# Patient Record
Sex: MALE | Race: WHITE | Employment: FULL TIME | URBAN - METROPOLITAN AREA
[De-identification: names, ages, dates, MRNs, and addresses within clinical notes are randomized per-mention and may not be internally consistent; named-entity substitution may affect disease eponyms.]

---

## 2017-01-06 ENCOUNTER — COMMUNICATION - HEALTHEAST (OUTPATIENT)
Dept: INTERNAL MEDICINE | Facility: CLINIC | Age: 42
End: 2017-01-06

## 2017-01-06 DIAGNOSIS — M81.0 POST-MENOPAUSAL OSTEOPOROSIS: ICD-10-CM

## 2017-01-24 ENCOUNTER — COMMUNICATION - HEALTHEAST (OUTPATIENT)
Dept: INTERNAL MEDICINE | Facility: CLINIC | Age: 42
End: 2017-01-24

## 2017-01-24 DIAGNOSIS — M81.0 POST-MENOPAUSAL OSTEOPOROSIS: ICD-10-CM

## 2017-03-02 ENCOUNTER — COMMUNICATION - HEALTHEAST (OUTPATIENT)
Dept: INTERNAL MEDICINE | Facility: CLINIC | Age: 42
End: 2017-03-02

## 2017-03-02 DIAGNOSIS — M81.0 POST-MENOPAUSAL OSTEOPOROSIS: ICD-10-CM

## 2017-04-21 ENCOUNTER — COMMUNICATION - HEALTHEAST (OUTPATIENT)
Dept: INTERNAL MEDICINE | Facility: CLINIC | Age: 42
End: 2017-04-21

## 2017-04-21 DIAGNOSIS — M81.0 POST-MENOPAUSAL OSTEOPOROSIS: ICD-10-CM

## 2017-05-25 ENCOUNTER — HOSPITAL ENCOUNTER (OUTPATIENT)
Dept: GENERAL RADIOLOGY | Facility: CLINIC | Age: 42
Discharge: HOME | End: 2017-05-25
Attending: NURSE PRACTITIONER | Admitting: NURSE PRACTITIONER
Payer: COMMERCIAL

## 2017-05-25 ENCOUNTER — OFFICE VISIT (OUTPATIENT)
Dept: FAMILY MEDICINE | Facility: CLINIC | Age: 42
End: 2017-05-25
Payer: COMMERCIAL

## 2017-05-25 VITALS
DIASTOLIC BLOOD PRESSURE: 82 MMHG | BODY MASS INDEX: 37.97 KG/M2 | SYSTOLIC BLOOD PRESSURE: 114 MMHG | OXYGEN SATURATION: 97 % | TEMPERATURE: 98.7 F | WEIGHT: 222.4 LBS | HEIGHT: 64 IN | HEART RATE: 82 BPM

## 2017-05-25 DIAGNOSIS — J45.30 MILD PERSISTENT ASTHMA WITHOUT COMPLICATION: ICD-10-CM

## 2017-05-25 DIAGNOSIS — M79.672 LEFT FOOT PAIN: ICD-10-CM

## 2017-05-25 DIAGNOSIS — Z00.00 ROUTINE GENERAL MEDICAL EXAMINATION AT A HEALTH CARE FACILITY: Primary | ICD-10-CM

## 2017-05-25 DIAGNOSIS — J45.909 MODERATE ASTHMA: ICD-10-CM

## 2017-05-25 DIAGNOSIS — J45.901 ASTHMA FLARE: ICD-10-CM

## 2017-05-25 DIAGNOSIS — Z13.220 SCREENING CHOLESTEROL LEVEL: ICD-10-CM

## 2017-05-25 DIAGNOSIS — J45.20 MILD INTERMITTENT ASTHMA, UNCOMPLICATED: ICD-10-CM

## 2017-05-25 PROCEDURE — 99213 OFFICE O/P EST LOW 20 MIN: CPT | Mod: 25 | Performed by: NURSE PRACTITIONER

## 2017-05-25 PROCEDURE — 73630 X-RAY EXAM OF FOOT: CPT | Mod: LT

## 2017-05-25 PROCEDURE — 99395 PREV VISIT EST AGE 18-39: CPT | Performed by: NURSE PRACTITIONER

## 2017-05-25 RX ORDER — MONTELUKAST SODIUM 10 MG/1
10 TABLET ORAL AT BEDTIME
Qty: 90 TABLET | Refills: 1 | Status: SHIPPED | OUTPATIENT
Start: 2017-05-25 | End: 2017-12-22

## 2017-05-25 RX ORDER — ALBUTEROL SULFATE 90 UG/1
2 AEROSOL, METERED RESPIRATORY (INHALATION) EVERY 6 HOURS PRN
Qty: 3 INHALER | Refills: 3 | Status: SHIPPED | OUTPATIENT
Start: 2017-05-25 | End: 2019-01-17

## 2017-05-25 RX ORDER — LORATADINE 10 MG/1
10 TABLET ORAL DAILY
COMMUNITY
End: 2023-07-18

## 2017-05-25 RX ORDER — DICLOFENAC SODIUM 75 MG/1
75 TABLET, DELAYED RELEASE ORAL 2 TIMES DAILY PRN
Qty: 90 TABLET | Refills: 1 | Status: SHIPPED | OUTPATIENT
Start: 2017-05-25 | End: 2018-05-21

## 2017-05-25 RX ORDER — ALBUTEROL SULFATE 0.83 MG/ML
1 SOLUTION RESPIRATORY (INHALATION) EVERY 6 HOURS PRN
Qty: 30 VIAL | Refills: 0 | Status: SHIPPED | OUTPATIENT
Start: 2017-05-25

## 2017-05-25 NOTE — PROGRESS NOTES
SUBJECTIVE:     CC: Gerry Palafox is an 41 year old male who presents for preventative health visit.     Healthy Habits:    Do you get at least three servings of calcium containing foods daily (dairy, green leafy vegetables, etc.)? yes    Amount of exercise or daily activities, outside of work: 0 day(s) per week    Problems taking medications regularly No    Medication side effects: No    Have you had an eye exam in the past two years? no    Do you see a dentist twice per year? yes    Do you have sleep apnea, excessive snoring or daytime drowsiness? Yes- has been using CPAP machine for 3 years.    Joint Pain     Onset: 2 months     Description:   Location: Left big toe   Character: Dull ache now, was stabbing pain at first     Intensity: mild currently     Progression of Symptoms: better    Accompanying Signs & Symptoms:  Other symptoms: swelling   History:   Previous similar pain: YES      Precipitating factors:   Trauma or overuse: no     Alleviating factors:  Improved by: rest/inactivity, wearing shoes, diclofenac        Asthma Follow-Up    Was ACT completed today?    Yes    ACT Total Scores 5/25/2017   ACT TOTAL SCORE -   ASTHMA ER VISITS -   ASTHMA HOSPITALIZATIONS -   ACT TOTAL SCORE (Goal Greater than or Equal to 20) 18   In the past 12 months, how many times did you visit the emergency room for your asthma without being admitted to the hospital? 0   In the past 12 months, how many times were you hospitalized overnight because of your asthma? 0       Recent asthma triggers that patient is dealing with: pollens and exercise or sports        -------------------------------------    Today's PHQ-2 Score:   PHQ-2 ( 1999 Pfizer) 5/25/2017 12/31/2014   Q1: Little interest or pleasure in doing things 0 0   Q2: Feeling down, depressed or hopeless 0 0   PHQ-2 Score 0 0       Abuse: Current or Past(Physical, Sexual or Emotional)- No  Do you feel safe in your environment - Yes    Social History   Substance Use Topics      Smoking status: Never Smoker     Smokeless tobacco: Never Used     Alcohol use Yes      Comment: varies          Standardized Alcohol Screening Questionnaire  AUDIT   Questions 0 1 2 3 4 Score   1. How often do you have a drink  containing alcohol? Never Monthly or less 2 to 4  times a  month 2 to 3  times a  week 4 or more  times a  week  4   2. How many drinks containing alcohol  do you have on a typical day when you are drinking? 1 or 2 3 or 4 5 or 6 7 to 9 10 or more  1   3. How often do you have more than five  or more drinks on one occasion? Never Less  than  monthly Monthly Weekly Daily or  almost  daily  2   4. How often during the last year have  you found that you were not able to stop drinking once you had started? Never Less  than  monthly Monthly Weekly Daily or  almost  daily  1   5. How often during the last year have  you failed to do what was normally expected of you because of drinking? Never Less  than  monthly Monthly Weekly Daily or  almost  daily  0   6. How often during the last year have  you needed a first drink in the morning to get yourself going after a heavy drinking session? Never Less  than  monthly Monthly Weekly Daily or  almost  daily  0   7. How often during the last year have you had a feeling of guilt or remorse after drinking? Never Less  than  monthly Monthly Weekly Daily or  almost  daily  0   8. How often during the last year have  you been unable to remember what happened the night before because of your drinking? Never Less  than  monthly Monthly Weekly Daily or  almost  daily  2   9. Have you or someone else been  injured because of your drinking? No  Yes, but not in the last year  Yes,  during the  last year  0   10. Has a relative, friend, doctor or other health care worker been concerned about your drinking or suggested you cut down? No  Yes, but not in the last year  Yes,  during the  last year  0   Total  10   Scoring: A score of 7 for adult men is an indication of  "hazardous drinking (risk for physical or physiological harm); a score of 8 or more is an indication of an alcohol use disorder. A score of 5 or more for adult women  is an indication of hazardous drinking or an alcohol use disorder.       Last PSA: No results found for: PSA    No results for input(s): CHOL, HDL, LDL, TRIG, CHOLHDLRATIO, NHDL in the last 07365 hours.    Reviewed orders with patient. Reviewed health maintenance and updated orders accordingly - Yes    Reviewed and updated as needed this visit by clinical staff  Allergies  Meds         Reviewed and updated as needed this visit by Provider            ROS:  C: NEGATIVE for fever, chills, change in weight  I: NEGATIVE for worrisome rashes, moles or lesions  E: NEGATIVE for vision changes or irritation  ENT: NEGATIVE for ear, mouth and throat problems  R: NEGATIVE for significant cough or SOB  CV: NEGATIVE for chest pain, palpitations or peripheral edema  GI: NEGATIVE for nausea, abdominal pain, heartburn, or change in bowel habits   male: negative for dysuria, hematuria, decreased urinary stream, erectile dysfunction, urethral discharge  MUSCULOSKELETAL:NEGATIVE for significant arthralgias or myalgia  N: NEGATIVE for weakness, dizziness or paresthesias  P: NEGATIVE for changes in mood or affect    Problem list, Medication list, Allergies, and Medical/Social/Surgical histories reviewed in [facility] and updated as appropriate.  Labs reviewed in EPIC  OBJECTIVE:     Pulse 82  Temp 98.7  F (37.1  C) (Oral)  Ht 5' 4\" (1.626 m)  Wt 222 lb 6.4 oz (100.9 kg)  SpO2 97%  BMI 38.17 kg/m2  EXAM:  GENERAL: healthy, alert and no distress  EYES: Eyes grossly normal to inspection, PERRL and conjunctivae and sclerae normal  HENT: normal cephalic/atraumatic, ear canals and TM's normal, nasal mucosa edematous , oropharynx clear and oral mucous membranes moist  NECK: no adenopathy, no asymmetry, masses, or scars and thyroid normal to palpation  RESP: lungs clear to " "auscultation - no rales, rhonchi or wheezes  CV: regular rate and rhythm, normal S1 S2, no S3 or S4, no murmur, click or rub, no peripheral edema and peripheral pulses strong  ABDOMEN: soft, nontender, no hepatosplenomegaly, no masses and bowel sounds normal  MS: tender and warm in left forefoot  SKIN: no suspicious lesions or rashes  PSYCH: mentation appears normal, affect normal/bright    ASSESSMENT/PLAN:         ICD-10-CM    1. Routine general medical examination at a health care facility Z00.00 Comprehensive metabolic panel   2. Left foot pain M79.672 ORTHO  REFERRAL     XR Foot Left G/E 3 Views     diclofenac (VOLTAREN) 75 MG EC tablet   3. Moderate asthma J45.998 montelukast (SINGULAIR) 10 MG tablet   4. Mild intermittent asthma, uncomplicated J45.20 albuterol (PROAIR HFA/PROVENTIL HFA/VENTOLIN HFA) 108 (90 BASE) MCG/ACT Inhaler   5. Asthma flare J45.901 albuterol (2.5 MG/3ML) 0.083% neb solution   6. Mild persistent asthma without complication J45.30 albuterol (2.5 MG/3ML) 0.083% neb solution   7. Screening cholesterol level Z13.220 Lipid Profile       COUNSELING:  Reviewed preventive health counseling, as reflected in patient instructions       Regular exercise       Healthy diet/nutrition         reports that he has never smoked. He has never used smokeless tobacco.    Estimated body mass index is 38.17 kg/(m^2) as calculated from the following:    Height as of this encounter: 5' 4\" (1.626 m).    Weight as of this encounter: 222 lb 6.4 oz (100.9 kg).   Weight management plan: Discussed healthy diet and exercise guidelines and patient will follow up in 12 months in clinic to re-evaluate.    Counseling Resources:  ATP IV Guidelines  Pooled Cohorts Equation Calculator  FRAX Risk Assessment  ICSI Preventive Guidelines  Dietary Guidelines for Americans, 2010  USDA's MyPlate  ASA Prophylaxis  Lung CA Screening    CHARO Shepard CNP  [facility]  "

## 2017-05-25 NOTE — NURSING NOTE
"Chief Complaint   Patient presents with     Physical       Initial /82 (BP Location: Right arm, Patient Position: Chair, Cuff Size: Adult Large)  Pulse 82  Temp 98.7  F (37.1  C) (Oral)  Ht 5' 4\" (1.626 m)  Wt 222 lb 6.4 oz (100.9 kg)  SpO2 97%  BMI 38.17 kg/m2 Estimated body mass index is 38.17 kg/(m^2) as calculated from the following:    Height as of this encounter: 5' 4\" (1.626 m).    Weight as of this encounter: 222 lb 6.4 oz (100.9 kg).  Medication Reconciliation: complete     Krysten Hinds CMA    "

## 2017-05-25 NOTE — MR AVS SNAPSHOT
After Visit Summary   5/25/2017    Gerry Palafox    MRN: [id number]           Patient Information     Date Of Birth          1975        Visit Information        Provider Department      5/25/2017 11:00 AM Pooja Rodriguez APRN [facility]        Today's Diagnoses     Routine general medical examination at a health care facility    -  1    Left foot pain        Moderate asthma        Mild intermittent asthma, uncomplicated        Asthma flare        Mild persistent asthma without complication          Care Instructions      Preventive Health Recommendations  Male Ages 26 - 39    Yearly exam:             See your health care provider every year in order to  o   Review health changes.   o   Discuss preventive care.    o   Review your medicines if your doctor has prescribed any.    You should be tested each year for STDs (sexually transmitted diseases), if you re at risk.     After age 35, talk to your provider about cholesterol testing. If you are at risk for heart disease, have your cholesterol tested at least every 5 years.     If you are at risk for diabetes, you should have a diabetes test (fasting glucose).  Shots: Get a flu shot each year. Get a tetanus shot every 10 years.     Nutrition:    Eat at least 5 servings of fruits and vegetables daily.     Eat whole-grain bread, whole-wheat pasta and brown rice instead of white grains and rice.     Talk to your provider about Calcium and Vitamin D.     Lifestyle    Exercise for at least 150 minutes a week (30 minutes a day, 5 days a week). This will help you control your weight and prevent disease.     Limit alcohol to one drink per day.     No smoking.     Wear sunscreen to prevent skin cancer.     See your dentist every six months for an exam and cleaning.             Follow-ups after your visit        Additional Services     ORTHO  REFERRAL       [facility] is referring you to the Orthopedic   Services at [facility].       The  Representative will assist you in the coordination of your Orthopedic and Musculoskeletal Care as prescribed by your physician.    The  Representative will call you within 1 business day to help schedule your appointment, or you may contact the  Representative at:    All areas ~ [phone]     Type of Referral : [facility]       Timeframe requested: 3 - 5 days    Coverage of these services is subject to the terms and limitations of your health insurance plan.  Please call member services at your health plan with any benefit or coverage questions.      If X-rays, CT or MRI's have been performed, please contact the facility where they were done to arrange for , prior to your scheduled appointment.  Please bring this referral request to your appointment and present it to your specialist.                  Future tests that were ordered for you today     Open Future Orders        Priority Expected Expires Ordered    XR Foot Left G/E 3 Views Routine 5/25/2017 5/25/2018 5/25/2017            Who to contact     If you have questions or need follow up information about today's clinic visit or your schedule please contact [facility] directly at [phone].  Normal or non-critical lab and imaging results will be communicated to you by MyChart, letter or phone within 4 business days after the clinic has received the results. If you do not hear from us within 7 days, please contact the clinic through [employer] or phone. If you have a critical or abnormal lab result, we will notify you by phone as soon as possible.  Submit refill requests through [employer] or call your pharmacy and they will forward the refill request to us. Please allow 3 business days for your refill to be completed.          Additional Information About Your Visit        [employer] Information     [employer] gives you secure  "access to your electronic health record. If you see a primary care provider, you can also send messages to your care team and make appointments. If you have questions, please call your primary care clinic.  If you do not have a primary care provider, please call [phone] and they will assist you.        Care EveryWhere ID     This is your Care EveryWhere ID. This could be used by other organizations to access your [place] medical records  [id number]        Your Vitals Were     Pulse Temperature Height Pulse Oximetry BMI (Body Mass Index)       82 98.7  F (37.1  C) (Oral) 5' 4\" (1.626 m) 97% 38.17 kg/m2        Blood Pressure from Last 3 Encounters:   05/25/17 114/82   01/17/15 145/87   12/31/14 138/90    Weight from Last 3 Encounters:   05/25/17 222 lb 6.4 oz (100.9 kg)   01/17/15 225 lb (102.1 kg)   12/31/14 227 lb (103 kg)              We Performed the Following     ORTHO  REFERRAL          Today's Medication Changes          These changes are accurate as of: 5/25/17 11:26 AM.  If you have any questions, ask your nurse or doctor.               Start taking these medicines.        Dose/Directions    diclofenac 75 MG EC tablet   Commonly known as:  VOLTAREN   Used for:  Left foot pain   Started by:  Pooja Rodriguez APRN CNP        Dose:  75 mg   Take 1 tablet (75 mg) by mouth 2 times daily as needed for moderate pain   Quantity:  90 tablet   Refills:  1       montelukast 10 MG tablet   Commonly known as:  SINGULAIR   Used for:  Moderate asthma   Started by:  Pooja Rodriguez APRN CNP        Dose:  10 mg   Take 1 tablet (10 mg) by mouth At Bedtime   Quantity:  90 tablet   Refills:  1         Stop taking these medicines if you haven't already. Please contact your care team if you have questions.     azithromycin 250 MG tablet   Commonly known as:  ZITHROMAX   Stopped by:  Pooja Rodriguez APRN CNP           guaiFENesin-codeine 100-10 MG/5ML Soln solution   Commonly known as:  ROBITUSSIN AC "   Stopped by:  Pooja Rodriguez APRN CNP           ipratropium - albuterol 0.5 mg/2.5 mg/3 mL 0.5-2.5 (3) MG/3ML neb solution   Commonly known as:  DUONEB   Stopped by:  Pooja Rodriguez APRN CNP           predniSONE 20 MG tablet   Commonly known as:  DELTASONE   Stopped by:  Pooja Rodriguez APRN CNP                Where to get your medicines      These medications were sent to [place] [address] [place] [facility] [place] [place] - [address]  [address] [address]     Phone:  [phone]     albuterol (2.5 MG/3ML) 0.083% neb solution    albuterol 108 (90 BASE) MCG/ACT Inhaler    diclofenac 75 MG EC tablet    montelukast 10 MG tablet                Primary Care Provider Office Phone # Fax #    CHARO Shepard [phone]       [facility] [address] [address]  [facility] [address]        Thank you!     Thank you for choosing [facility]  for your care. Our goal is always to provide you with excellent care. Hearing back from our patients is one way we can continue to improve our services. Please take a few minutes to complete the written survey that you may receive in the mail after your visit with us. Thank you!             Your Updated Medication List - Protect others around you: Learn how to safely use, store and throw away your medicines at www.disposemymeds.org.          This list is accurate as of: 5/25/17 11:26 AM.  Always use your most recent med list.                   Brand Name Dispense Instructions for use    * albuterol 108 (90 BASE) MCG/ACT Inhaler    PROAIR HFA/PROVENTIL HFA/VENTOLIN HFA    3 Inhaler    Inhale 2 puffs into the lungs every 6 hours as needed for shortness of breath / dyspnea or wheezing       * albuterol (2.5 MG/3ML) 0.083% neb solution     30 vial    Take 1 vial (2.5 mg) by nebulization every 6 hours as needed for shortness of breath / dyspnea or wheezing       DAILY MULTIVITAMIN PO          diclofenac 75 MG EC  tablet    VOLTAREN    90 tablet    Take 1 tablet (75 mg) by mouth 2 times daily as needed for moderate pain       DICLOFENAC PO          fluticasone 44 MCG/ACT Inhaler    FLOVENT HFA    3 Inhaler    Inhale 2 puffs into the lungs 2 times daily       glucosamine-chondroitin 500-400 MG Caps per capsule      Take 1 capsule by mouth daily       IBUPROFEN PO      Take 400 mg by mouth 2 times daily as needed for moderate pain       loratadine 10 MG tablet    CLARITIN     Take 10 mg by mouth daily       montelukast 10 MG tablet    SINGULAIR    90 tablet    Take 1 tablet (10 mg) by mouth At Bedtime       order for DME     1 Device    Equipment being ordered: Nebulizer       * Notice:  This list has 2 medication(s) that are the same as other medications prescribed for you. Read the directions carefully, and ask your doctor or other care provider to review them with you.

## 2017-05-26 ASSESSMENT — ASTHMA QUESTIONNAIRES: ACT_TOTALSCORE: 18

## 2017-06-09 ENCOUNTER — TELEPHONE (OUTPATIENT)
Dept: FAMILY MEDICINE | Facility: CLINIC | Age: 42
End: 2017-06-09

## 2017-06-09 DIAGNOSIS — Z30.2 ENCOUNTER FOR VASECTOMY: Primary | ICD-10-CM

## 2017-06-09 NOTE — TELEPHONE ENCOUNTER
Route to Provider pool    Pt requesting referral for Vasectomy    Urology referral cued    Advise     Mahsa Prasad RN

## 2017-06-12 ENCOUNTER — OFFICE VISIT (OUTPATIENT)
Dept: PODIATRY | Facility: CLINIC | Age: 42
End: 2017-06-12
Payer: COMMERCIAL

## 2017-06-12 VITALS
DIASTOLIC BLOOD PRESSURE: 77 MMHG | HEART RATE: 71 BPM | BODY MASS INDEX: 36.9 KG/M2 | WEIGHT: 215 LBS | OXYGEN SATURATION: 98 % | SYSTOLIC BLOOD PRESSURE: 128 MMHG | RESPIRATION RATE: 14 BRPM | TEMPERATURE: 97.6 F

## 2017-06-12 DIAGNOSIS — M25.80 SESAMOIDITIS: ICD-10-CM

## 2017-06-12 DIAGNOSIS — M79.672 LEFT FOOT PAIN: Primary | ICD-10-CM

## 2017-06-12 PROCEDURE — 99203 OFFICE O/P NEW LOW 30 MIN: CPT | Performed by: PODIATRIST

## 2017-06-12 NOTE — MR AVS SNAPSHOT
After Visit Summary   6/12/2017    Gerry Palafox    MRN: [id number]           Patient Information     Date Of Birth          1975        Visit Information        Provider Department      6/12/2017 10:20 AM Bret Chowdhury DPM [facility]        Today's Diagnoses     Left foot pain    -  1    Sesamoiditis          Care Instructions    Sesamoid Injuries in the Foot  What is a Sesamoid?  A sesamoid is a bone embedded in a tendon. Sesamoids are found in several joints in the body. In the normal foot, the sesamoids are two pea-shaped bones located in the ball of the foot, beneath the big toe joint.  Acting as a pulley for tendons, the sesamoids help the big toe move normally and provide leverage when the big toe  pushes off  during walking and running. The sesamoids also serve as a weight-bearing surface for the first metatarsal bone (the long bone connected to the big toe), absorbing the weight placed on the ball of the foot when walking, running, and jumping.  Sesamoid injuries can involve the bones, tendons, and/or surrounding tissue in the joint. They are often associated with activities requiring increased pressure on the ball of the foot, such as running, basketball, football, golf, tennis, and ballet. In addition, people with high arches are at risk for developing sesamoid problems. Frequent wearing of high-heeled shoes can also be a contributing factor.  Types of Sesamoid Injuries in the Foot  There are three types of sesamoid injuries in the foot:  Turf toe. This is an injury of the soft tissue surrounding the big toe joint. It usually occurs when the big toe joint is extended beyond its normal range. Turf toe causes immediate, sharp pain and swelling. It usually affects the entire big toe joint and limits the motion of the toe. Turf toe may result in an injury to the soft tissue attached to the sesamoid or a fracture of the sesamoid. Sometimes a  pop  is felt at the  moment of injury.   Fracture. A fracture (break) in a sesamoid bone can be either acute or chronic.   An acute fracture is caused by trauma - a direct blow or impact to the bone. An acute sesamoid fracture produces immediate pain and swelling at the site of the break, but usually does not affect the entire big toe joint.   A chronic fracture is a stress fracture (a hairline break usually caused by repetitive stress or overuse). A chronic sesamoid fracture produces longstanding pain in the ball of the foot beneath the big toe joint. The pain, which tends to come and go, generally is aggravated with activity and relieved with rest.  Sesamoiditis. This is an overuse injury involving chronic inflammation of the sesamoid bones and the tendons involved with those bones. Sesamoiditis is caused by increased pressure to the sesamoids. Often, sesamoiditis is associated with a dull, longstanding pain beneath the big toe joint. The pain comes and goes, usually occurring with certain shoes or certain activities.  Diagnosis  In diagnosing a sesamoid injury, the foot and ankle surgeon will examine the foot, focusing on the big toe joint. The surgeon will press on the big toe, move it up and down, and may assess the patient s walking and evaluate the wear pattern on the patient s shoes. X-rays are ordered, and in some cases, advanced imaging studies may be ordered.  Non-Surgical Treatment  Non-surgical treatment for sesamoid injuries of the foot may include one or more of the following options, depending on the type of injury and degree of severity:  Padding, strapping, or taping. A pad may be placed in the shoe to cushion the inflamed sesamoid area, or the toe may be taped or strapped to relieve that area of tension.   Immobilization. The foot may be placed in a cast or removable walking cast. Crutches may be used to prevent placing weight on the foot.   Oral medications. Nonsteroidal anti-inflammatory drugs (NSAIDs), such as  ibuprofen, are often helpful in reducing the pain and inflammation.   Physical therapy. The rehabilitation period following immobilization sometimes includes physical therapy, such as exercises (range-of-motion, strengthening, and conditioning) and ultrasound therapy.   Steroid injections. In some cases, cortisone is injected in the joint to reduce pain and inflammation.   Orthotic devices. Custom orthotic devices that fit into the shoe may be prescribed for long-term treatment of sesamoiditis to balance the pressure placed on the ball of the foot.  When is Surgery Needed?  When sesamoid injuries fail to respond to non-surgical treatment, surgery may be required. The foot and ankle surgeon will determine the type of procedure that is best suited to the individual patient.        PRICE Therapy    Many aches and pains throughout the foot and ankle can be helped with many simple treatments.  This is usually described as PRICE Therapy.      P - Protection - often times, inflammation/pain in the lower extremity is not able to improve simply because the areas involved are never allowed to rest.  Every step we take can bother the problematic area.  Protecting those areas is an important step in the healing process.  This may involve a walking cast boot, a special insert/orthotic device, an ankle brace, or simply avoiding barefoot walking.    R - Rest - in addition to protecting the foot/ankle, resting is an important, but often times difficult, treatment option.  Getting off your feet when they bother you, and specifically avoiding activities that cause pain/discomfort, are very beneficial to prevent, and treat, foot/ankle pain.      I - Ice - icing regularly can help to decrease inflammation and swelling in the foot, thus decreasing pain.  Using an ice pack or a bag of frozen peas works very well.  Ice for 20 minutes multiple times per day as needed.  Do not place the ice directly on the skin as this can cause tissue  damage.    C - Compression - using a compression wrap or an ACE wrap can help to decrease swelling, which can help to decrease pain.  Wearing the wraps is generally not needed at night, but they should be worn on a regular basis when you are going to be on your feet for prolonged periods as gravity tends to pull fluids down to your feet/ankles.    E - Elevation - elevating your lower extremities multiple times daily for 15-20 minutes can help to decrease swelling, which works well in decreasing pain levels.      NSAID/Tylenol - An anti-inflammatory, like Aleve or ibuprofen, and/or a pain medication, such as Tylenol, can help to improve pain levels and get the issue resolved sooner rather than later.  Anyone with liver issues should be careful with Tylenol, and anyone with high blood pressure or heart, stomach or kidney issues should be careful with anti-inflammatories.  Please ask if you have questions about these medications, including dosage.              Follow-ups after your visit        Additional Services     ORTHOTICS REFERRAL       Please be aware that coverage of these services is subject to the terms and limitations of your health insurance plan.  Call member services at your health plan with any benefit or coverage questions.      Please bring the following to your appointment:    >>   Any x-rays, CTs or MRIs which have been performed.  Contact the facility where they were done to arrange for  prior to your scheduled appointment.  Any new CT, MRI or other procedures ordered by your specialist must be performed at a [facility] facility or coordinated by your clinic's referral office.    >>   List of current medications   >>   This referral request   >>   Any documents/labs given to you for this referral    ==This Referral PRINTS in the [facility] (ORTHOTICS & PROSTHETICS) Central scheduling office ==     The [facility] Central Scheduling staff will contact patient to arrange  appointments. Central Scheduling Phone #:  [facility]  [phone]     Orthotics: Foot Orthotics - offload left sesamoids                  Your next 10 appointments already scheduled     Jun 14, 2017 10:00 AM CDT   LAB with RD LAB   [facility] ([facility]    [address] [place] [id number] [phone]           Patient must bring picture ID.  Patient should be prepared to give a urine specimen  Please do not eat 10-12 hours before your appointment if you are coming in fasting for labs on lipids, cholesterol, or glucose (sugar).  Pregnant women should follow their Care Team instructions. Water with medications is okay. Do not drink coffee or other fluids.   If you have concerns about taking  your medications, please ask at office or if scheduling via [employer], send a message by clicking on Secure Messaging, Message Your Care Team.              Who to contact     If you have questions or need follow up information about today's clinic visit or your schedule please contact [facility] directly at [phone].  Normal or non-critical lab and imaging results will be communicated to you by [email or web address], letter or phone within 4 business days after the clinic has received the results. If you do not hear from us within 7 days, please contact the clinic through [employer] or phone. If you have a critical or abnormal lab result, we will notify you by phone as soon as possible.  Submit refill requests through [employer] or call your pharmacy and they will forward the refill request to us. Please allow 3 business days for your refill to be completed.          Additional Information About Your Visit        [employer] Information     [employer] gives you secure access to your electronic health record. If you see a primary care provider, you can also send messages to your care team and make appointments. If you have questions, please call your primary care  clinic.  If you do not have a primary care provider, please call [phone] and they will assist you.        Care EveryWhere ID     This is your Care EveryWhere ID. This could be used by other organizations to access your [facility] medical records  [id number]        Your Vitals Were     Pulse Temperature Respirations Pulse Oximetry BMI (Body Mass Index)       71 97.6  F (36.4  C) (Oral) 14 98% 36.9 kg/m2        Blood Pressure from Last 3 Encounters:   06/12/17 128/77   05/25/17 114/82   01/17/15 145/87    Weight from Last 3 Encounters:   06/12/17 215 lb (97.5 kg)   05/25/17 222 lb 6.4 oz (100.9 kg)   01/17/15 225 lb (102.1 kg)              We Performed the Following     ORTHOTICS REFERRAL        Primary Care Provider Office Phone # Fax #    CHARO Shepard [phone]       [facility] [address] [place] [id number]        Thank you!     Thank you for choosing [facility]  for your care. Our goal is always to provide you with excellent care. Hearing back from our patients is one way we can continue to improve our services. Please take a few minutes to complete the written survey that you may receive in the mail after your visit with us. Thank you!             Your Updated Medication List - Protect others around you: Learn how to safely use, store and throw away your medicines at www.disposemymeds.org.          This list is accurate as of: 6/12/17  3:20 PM.  Always use your most recent med list.                   Brand Name Dispense Instructions for use    * albuterol 108 (90 BASE) MCG/ACT Inhaler    PROAIR HFA/PROVENTIL HFA/VENTOLIN HFA    3 Inhaler    Inhale 2 puffs into the lungs every 6 hours as needed for shortness of breath / dyspnea or wheezing       * albuterol (2.5 MG/3ML) 0.083% neb solution     30 vial    Take 1 vial (2.5 mg) by nebulization every 6 hours as needed for shortness of breath / dyspnea or wheezing       DAILY MULTIVITAMIN  PO          diclofenac 75 MG EC tablet    VOLTAREN    90 tablet    Take 1 tablet (75 mg) by mouth 2 times daily as needed for moderate pain       DICLOFENAC PO          fluticasone 44 MCG/ACT Inhaler    FLOVENT HFA    3 Inhaler    Inhale 2 puffs into the lungs 2 times daily       glucosamine-chondroitin 500-400 MG Caps per capsule      Take 1 capsule by mouth daily       IBUPROFEN PO      Take 400 mg by mouth 2 times daily as needed for moderate pain       loratadine 10 MG tablet    CLARITIN     Take 10 mg by mouth daily       montelukast 10 MG tablet    SINGULAIR    90 tablet    Take 1 tablet (10 mg) by mouth At Bedtime       order for DME     1 Device    Equipment being ordered: Nebulizer       * Notice:  This list has 2 medication(s) that are the same as other medications prescribed for you. Read the directions carefully, and ask your doctor or other care provider to review them with you.

## 2017-06-12 NOTE — PROGRESS NOTES
PATIENT HISTORY:  Gerry Palafox is a 41 year old male who presents to clinic for left foot pain, intermittent for years.  No new injury.  2-8/10 pain, worse with walking.  He has tried ice, rest, otc inserts, with some improvement.      Review of Systems:  Patient denies fever, chills, rash, wound, stiffness, weakness, heart burn, blood in stool, chest pain with activity, calf pain when walking, chronic cough, easy bleeding/bruising, swelling of ankles, excessive thirst, depression, anxiety.  Patient admits to limping, numbness, SOB with activity, fatigue.     PAST MEDICAL HISTORY:   Past Medical History:   Diagnosis Date     Mild persistent asthma         PAST SURGICAL HISTORY: No past surgical history on file.     MEDICATIONS:   Current Outpatient Prescriptions:      DICLOFENAC PO, , Disp: , Rfl:      loratadine (CLARITIN) 10 MG tablet, Take 10 mg by mouth daily, Disp: , Rfl:      montelukast (SINGULAIR) 10 MG tablet, Take 1 tablet (10 mg) by mouth At Bedtime, Disp: 90 tablet, Rfl: 1     diclofenac (VOLTAREN) 75 MG EC tablet, Take 1 tablet (75 mg) by mouth 2 times daily as needed for moderate pain, Disp: 90 tablet, Rfl: 1     albuterol (PROAIR HFA/PROVENTIL HFA/VENTOLIN HFA) 108 (90 BASE) MCG/ACT Inhaler, Inhale 2 puffs into the lungs every 6 hours as needed for shortness of breath / dyspnea or wheezing, Disp: 3 Inhaler, Rfl: 3     albuterol (2.5 MG/3ML) 0.083% neb solution, Take 1 vial (2.5 mg) by nebulization every 6 hours as needed for shortness of breath / dyspnea or wheezing, Disp: 30 vial, Rfl: 0     ORDER FOR DME, Equipment being ordered: Nebulizer, Disp: 1 Device, Rfl: 0     IBUPROFEN PO, Take 400 mg by mouth 2 times daily as needed for moderate pain, Disp: , Rfl:      Multiple Vitamin (DAILY MULTIVITAMIN PO), , Disp: , Rfl:      glucosamine-chondroitin 500-400 MG CAPS, Take 1 capsule by mouth daily, Disp: , Rfl:      fluticasone (FLOVENT HFA) 44 MCG/ACT inhaler, Inhale 2 puffs into the lungs 2 times daily,  Disp: 3 Inhaler, Rfl: 3     ALLERGIES:    Allergies   Allergen Reactions     Penicillin G      hives        SOCIAL HISTORY:   Social History     Social History     Marital status:      Spouse name: N/A     Number of children: N/A     Years of education: N/A     Occupational History     Not on file.     Social History Main Topics     Smoking status: Never Smoker     Smokeless tobacco: Never Used     Alcohol use Yes      Comment: varies     Drug use: No     Sexual activity: Yes     Partners: Female     Other Topics Concern     Parent/Sibling W/ Cabg, Mi Or Angioplasty Before 65f 55m? No     Social History Narrative        FAMILY HISTORY:   Family History   Problem Relation Age of Onset     Arthritis Mother      CANCER Father      lung cancer      Other         EXAM:Vitals: /77  Pulse 71  Temp 97.6  F (36.4  C) (Oral)  Resp 14  Wt 215 lb (97.5 kg)  SpO2 98%  BMI 36.9 kg/m2  BMI= Body mass index is 36.9 kg/(m^2).    General appearance: Patient is alert and fully cooperative with history & exam.  No sign of distress is noted during the visit.     Psychiatric: Affect is pleasant & appropriate.  Patient appears motivated to improve health.     Respiratory: Breathing is regular & unlabored while sitting.     HEENT: Hearing is intact to spoken word.  Speech is clear.  No gross evidence of visual impairment that would impact ambulation.     Dermatologic: Skin is intact to the L foot without significant lesions, rash or abrasion.  No paronychia or evidence of soft tissue infection is noted.     Vascular: DP & PT pulses are intact & regular on the L.  No significant edema or varicosities noted.  CFT and skin temperature are normal.     Neurologic: Lower extremity sensation is intact to light touch.  No evidence of weakness or contracture in the lower extremities.  No evidence of neuropathy.     Musculoskeletal:  Cavus foot noted.  Mild pain to palpation of left foot sesamoids.  1st MPJ ROM full and pain free.  Patient is ambulatory without assistive device or brace.  No gross ankle deformity noted.  No foot or ankle joint effusion is noted.    XRs of left foot reviewed with pt.  No acute findings per radiology read.  Bipartite sesamoids.     ASSESSMENT: L foot pain, sesamoiditis     PLAN:  Reviewed patient's chart in epic.  Discussed condition and treatment options including pros and cons.    Exam suggests sesamoiditis.  RICE advised.  No barefoot walking.  Advised stiff soled shoes.  Will send for custom orthotics to offload.  Discussed MRI, possible boot if not improving.  F/u if worsening or failing to improve.    Epic was down during this encounter, but AVS will be available via [employer].       Bret Chowdhury DPM, FACFAS    Weight management plan: Patient was referred to their PCP to discuss a diet and exercise plan.

## 2017-06-12 NOTE — PATIENT INSTRUCTIONS
Sesamoid Injuries in the Foot  What is a Sesamoid?  A sesamoid is a bone embedded in a tendon. Sesamoids are found in several joints in the body. In the normal foot, the sesamoids are two pea-shaped bones located in the ball of the foot, beneath the big toe joint.  Acting as a pulley for tendons, the sesamoids help the big toe move normally and provide leverage when the big toe  pushes off  during walking and running. The sesamoids also serve as a weight-bearing surface for the first metatarsal bone (the long bone connected to the big toe), absorbing the weight placed on the ball of the foot when walking, running, and jumping.  Sesamoid injuries can involve the bones, tendons, and/or surrounding tissue in the joint. They are often associated with activities requiring increased pressure on the ball of the foot, such as running, basketball, football, golf, tennis, and ballet. In addition, people with high arches are at risk for developing sesamoid problems. Frequent wearing of high-heeled shoes can also be a contributing factor.  Types of Sesamoid Injuries in the Foot  There are three types of sesamoid injuries in the foot:  Turf toe. This is an injury of the soft tissue surrounding the big toe joint. It usually occurs when the big toe joint is extended beyond its normal range. Turf toe causes immediate, sharp pain and swelling. It usually affects the entire big toe joint and limits the motion of the toe. Turf toe may result in an injury to the soft tissue attached to the sesamoid or a fracture of the sesamoid. Sometimes a  pop  is felt at the moment of injury.   Fracture. A fracture (break) in a sesamoid bone can be either acute or chronic.   An acute fracture is caused by trauma   a direct blow or impact to the bone. An acute sesamoid fracture produces immediate pain and swelling at the site of the break, but usually does not affect the entire big toe joint.   A chronic fracture is a stress fracture (a hairline  break usually caused by repetitive stress or overuse). A chronic sesamoid fracture produces longstanding pain in the ball of the foot beneath the big toe joint. The pain, which tends to come and go, generally is aggravated with activity and relieved with rest.  Sesamoiditis. This is an overuse injury involving chronic inflammation of the sesamoid bones and the tendons involved with those bones. Sesamoiditis is caused by increased pressure to the sesamoids. Often, sesamoiditis is associated with a dull, longstanding pain beneath the big toe joint. The pain comes and goes, usually occurring with certain shoes or certain activities.  Diagnosis  In diagnosing a sesamoid injury, the foot and ankle surgeon will examine the foot, focusing on the big toe joint. The surgeon will press on the big toe, move it up and down, and may assess the patient s walking and evaluate the wear pattern on the patient s shoes. X-rays are ordered, and in some cases, advanced imaging studies may be ordered.  Non-Surgical Treatment  Non-surgical treatment for sesamoid injuries of the foot may include one or more of the following options, depending on the type of injury and degree of severity:  Padding, strapping, or taping. A pad may be placed in the shoe to cushion the inflamed sesamoid area, or the toe may be taped or strapped to relieve that area of tension.   Immobilization. The foot may be placed in a cast or removable walking cast. Crutches may be used to prevent placing weight on the foot.   Oral medications. Nonsteroidal anti-inflammatory drugs (NSAIDs), such as ibuprofen, are often helpful in reducing the pain and inflammation.   Physical therapy. The rehabilitation period following immobilization sometimes includes physical therapy, such as exercises (range-of-motion, strengthening, and conditioning) and ultrasound therapy.   Steroid injections. In some cases, cortisone is injected in the joint to reduce pain and inflammation.    Orthotic devices. Custom orthotic devices that fit into the shoe may be prescribed for long-term treatment of sesamoiditis to balance the pressure placed on the ball of the foot.  When is Surgery Needed?  When sesamoid injuries fail to respond to non-surgical treatment, surgery may be required. The foot and ankle surgeon will determine the type of procedure that is best suited to the individual patient.        PRICE Therapy    Many aches and pains throughout the foot and ankle can be helped with many simple treatments.  This is usually described as PRICE Therapy.      P - Protection - often times, inflammation/pain in the lower extremity is not able to improve simply because the areas involved are never allowed to rest.  Every step we take can bother the problematic area.  Protecting those areas is an important step in the healing process.  This may involve a walking cast boot, a special insert/orthotic device, an ankle brace, or simply avoiding barefoot walking.    R - Rest - in addition to protecting the foot/ankle, resting is an important, but often times difficult, treatment option.  Getting off your feet when they bother you, and specifically avoiding activities that cause pain/discomfort, are very beneficial to prevent, and treat, foot/ankle pain.      I - Ice - icing regularly can help to decrease inflammation and swelling in the foot, thus decreasing pain.  Using an ice pack or a bag of frozen peas works very well.  Ice for 20 minutes multiple times per day as needed.  Do not place the ice directly on the skin as this can cause tissue damage.    C - Compression - using a compression wrap or an ACE wrap can help to decrease swelling, which can help to decrease pain.  Wearing the wraps is generally not needed at night, but they should be worn on a regular basis when you are going to be on your feet for prolonged periods as gravity tends to pull fluids down to your feet/ankles.    E - Elevation - elevating  your lower extremities multiple times daily for 15-20 minutes can help to decrease swelling, which works well in decreasing pain levels.      NSAID/Tylenol - An anti-inflammatory, like Aleve or ibuprofen, and/or a pain medication, such as Tylenol, can help to improve pain levels and get the issue resolved sooner rather than later.  Anyone with liver issues should be careful with Tylenol, and anyone with high blood pressure or heart, stomach or kidney issues should be careful with anti-inflammatories.  Please ask if you have questions about these medications, including dosage.

## 2017-06-12 NOTE — LETTER
6/12/2017       RE: Gerry Palafox  [address] [place] [address]           Dear Colleague,    Thank you for referring your patient, Gerry Palafox, to the [facility]. Please see a copy of my visit note below.    PATIENT HISTORY:  Gerry Palafox is a 41 year old male who presents to clinic for left foot pain, intermittent for years.  No new injury.  2-8/10 pain, worse with walking.  He has tried ice, rest, otc inserts, with some improvement.      Review of Systems:  Patient denies fever, chills, rash, wound, stiffness, weakness, heart burn, blood in stool, chest pain with activity, calf pain when walking, chronic cough, easy bleeding/bruising, swelling of ankles, excessive thirst, depression, anxiety.  Patient admits to limping, numbness, SOB with activity, fatigue.     PAST MEDICAL HISTORY:   Past Medical History:   Diagnosis Date     Mild persistent asthma         PAST SURGICAL HISTORY: No past surgical history on file.     MEDICATIONS:   Current Outpatient Prescriptions:      DICLOFENAC PO, , Disp: , Rfl:      loratadine (CLARITIN) 10 MG tablet, Take 10 mg by mouth daily, Disp: , Rfl:      montelukast (SINGULAIR) 10 MG tablet, Take 1 tablet (10 mg) by mouth At Bedtime, Disp: 90 tablet, Rfl: 1     diclofenac (VOLTAREN) 75 MG EC tablet, Take 1 tablet (75 mg) by mouth 2 times daily as needed for moderate pain, Disp: 90 tablet, Rfl: 1     albuterol (PROAIR HFA/PROVENTIL HFA/VENTOLIN HFA) 108 (90 BASE) MCG/ACT Inhaler, Inhale 2 puffs into the lungs every 6 hours as needed for shortness of breath / dyspnea or wheezing, Disp: 3 Inhaler, Rfl: 3     albuterol (2.5 MG/3ML) 0.083% neb solution, Take 1 vial (2.5 mg) by nebulization every 6 hours as needed for shortness of breath / dyspnea or wheezing, Disp: 30 vial, Rfl: 0     ORDER FOR DME, Equipment being ordered: Nebulizer, Disp: 1 Device, Rfl: 0     IBUPROFEN PO, Take 400 mg by mouth 2 times daily as needed for moderate pain, Disp: , Rfl:       Multiple Vitamin (DAILY MULTIVITAMIN PO), , Disp: , Rfl:      glucosamine-chondroitin 500-400 MG CAPS, Take 1 capsule by mouth daily, Disp: , Rfl:      fluticasone (FLOVENT HFA) 44 MCG/ACT inhaler, Inhale 2 puffs into the lungs 2 times daily, Disp: 3 Inhaler, Rfl: 3     ALLERGIES:    Allergies   Allergen Reactions     Penicillin G      hives        SOCIAL HISTORY:   Social History     Social History     Marital status:      Spouse name: N/A     Number of children: N/A     Years of education: N/A     Occupational History     Not on file.     Social History Main Topics     Smoking status: Never Smoker     Smokeless tobacco: Never Used     Alcohol use Yes      Comment: varies     Drug use: No     Sexual activity: Yes     Partners: Female     Other Topics Concern     Parent/Sibling W/ Cabg, Mi Or Angioplasty Before 65f 55m? No     Social History Narrative        FAMILY HISTORY:   Family History   Problem Relation Age of Onset     Arthritis Mother      CANCER Father      lung cancer      Other         EXAM:Vitals: /77  Pulse 71  Temp 97.6  F (36.4  C) (Oral)  Resp 14  Wt 215 lb (97.5 kg)  SpO2 98%  BMI 36.9 kg/m2  BMI= Body mass index is 36.9 kg/(m^2).    General appearance: Patient is alert and fully cooperative with history & exam.  No sign of distress is noted during the visit.     Psychiatric: Affect is pleasant & appropriate.  Patient appears motivated to improve health.     Respiratory: Breathing is regular & unlabored while sitting.     HEENT: Hearing is intact to spoken word.  Speech is clear.  No gross evidence of visual impairment that would impact ambulation.     Dermatologic: Skin is intact to the L foot without significant lesions, rash or abrasion.  No paronychia or evidence of soft tissue infection is noted.     Vascular: DP & PT pulses are intact & regular on the L.  No significant edema or varicosities noted.  CFT and skin temperature are normal.     Neurologic: Lower extremity  sensation is intact to light touch.  No evidence of weakness or contracture in the lower extremities.  No evidence of neuropathy.     Musculoskeletal:  Cavus foot noted.  Mild pain to palpation of left foot sesamoids.  1st MPJ ROM full and pain free. Patient is ambulatory without assistive device or brace.  No gross ankle deformity noted.  No foot or ankle joint effusion is noted.    XRs of left foot reviewed with pt.  No acute findings per radiology read.  Bipartite sesamoids.     ASSESSMENT: L foot pain, sesamoiditis     PLAN:  Reviewed patient's chart in epic.  Discussed condition and treatment options including pros and cons.    Exam suggests sesamoiditis.  RICE advised.  No barefoot walking.  Advised stiff soled shoes.  Will send for custom orthotics to offload.  Discussed MRI, possible boot if not improving.  F/u if worsening or failing to improve.    Epic was down during this encounter, but AVS will be available via "[employer].       Bret Chowdhury DPM, FACFAS    Weight management plan: Patient was referred to their PCP to discuss a diet and exercise plan.        Again, thank you for allowing me to participate in the care of your patient.        Sincerely,              Bret Chowdhury DPM

## 2017-06-14 ENCOUNTER — TELEPHONE (OUTPATIENT)
Dept: FAMILY MEDICINE | Facility: CLINIC | Age: 42
End: 2017-06-14

## 2017-06-14 DIAGNOSIS — Z00.00 ROUTINE GENERAL MEDICAL EXAMINATION AT A HEALTH CARE FACILITY: ICD-10-CM

## 2017-06-14 DIAGNOSIS — Z13.220 SCREENING CHOLESTEROL LEVEL: ICD-10-CM

## 2017-06-14 PROCEDURE — 80061 LIPID PANEL: CPT | Performed by: NURSE PRACTITIONER

## 2017-06-14 PROCEDURE — 80053 COMPREHEN METABOLIC PANEL: CPT | Performed by: NURSE PRACTITIONER

## 2017-06-14 PROCEDURE — 36415 COLL VENOUS BLD VENIPUNCTURE: CPT | Performed by: NURSE PRACTITIONER

## 2017-06-15 LAB
ALBUMIN SERPL-MCNC: 4.1 G/DL (ref 3.4–5)
ALP SERPL-CCNC: 64 U/L (ref 40–150)
ALT SERPL W P-5'-P-CCNC: 51 U/L (ref 0–70)
ANION GAP SERPL CALCULATED.3IONS-SCNC: 9 MMOL/L (ref 3–14)
AST SERPL W P-5'-P-CCNC: 23 U/L (ref 0–45)
BILIRUB SERPL-MCNC: 0.9 MG/DL (ref 0.2–1.3)
BUN SERPL-MCNC: 12 MG/DL (ref 7–30)
CALCIUM SERPL-MCNC: 8.9 MG/DL (ref 8.5–10.1)
CHLORIDE SERPL-SCNC: 107 MMOL/L (ref 94–109)
CHOLEST SERPL-MCNC: 235 MG/DL
CO2 SERPL-SCNC: 22 MMOL/L (ref 20–32)
CREAT SERPL-MCNC: 0.71 MG/DL (ref 0.66–1.25)
GFR SERPL CREATININE-BSD FRML MDRD: NORMAL ML/MIN/1.7M2
GLUCOSE SERPL-MCNC: 97 MG/DL (ref 70–99)
HDLC SERPL-MCNC: 32 MG/DL
LDLC SERPL CALC-MCNC: 138 MG/DL
NONHDLC SERPL-MCNC: 203 MG/DL
POTASSIUM SERPL-SCNC: 3.8 MMOL/L (ref 3.4–5.3)
PROT SERPL-MCNC: 7.9 G/DL (ref 6.8–8.8)
SODIUM SERPL-SCNC: 138 MMOL/L (ref 133–144)
TRIGL SERPL-MCNC: 326 MG/DL

## 2017-06-15 ASSESSMENT — ASTHMA QUESTIONNAIRES: ACT_TOTALSCORE: 22

## 2017-07-16 NOTE — PROGRESS NOTES
Leonidas Garrett: Your recent results are back and LDL not at goal <130. Recommend diet and exercise, with a recheck in a year. As long as the risk is < 7.5%, you are still in a lower risk category.     The 10-year ASCVD risk score (Ivanhoeshayan NGUYEN Jr, et al., 2013) is: 2.9%    Values used to calculate the score:      Age: 42 years      Sex: Male      Is Non- : No      Diabetic: No      Tobacco smoker: No      Systolic Blood Pressure: 128 mmHg      Is BP treated: No      HDL Cholesterol: 32 mg/dL      Total Cholesterol: 235 mg/dL      Phu

## 2017-11-09 ENCOUNTER — TELEPHONE (OUTPATIENT)
Dept: PODIATRY | Facility: CLINIC | Age: 42
End: 2017-11-09

## 2017-11-09 NOTE — TELEPHONE ENCOUNTER
Called pts wife and advised that per Trenton's last note he asked them to follow up if it was getting worse. She will call to schedule him an appt. Told them to follow PRICE in the mean time.    Philly Hendrickson CMA ([facility])  Podiatry / Foot & Ankle Surgery  [facility]

## 2017-11-09 NOTE — TELEPHONE ENCOUNTER
Reason for Call:  Other call back    Detailed comments: Patient's spouse is requesting a call back to discuss other options for the patient's left foot pain. States they would like him to have a cortisone injection and are wondering the best way to go about this or who he should see and if it requires a referral. Please assist. Thanks!    Phone Number Patient can be reached at: Other phone number: Spouse Susan [phone]    Best Time: Any    Can we leave a detailed message on this number? YES    Call taken on 11/9/2017 at 12:26 PM by Lea Pal

## 2017-11-09 NOTE — TELEPHONE ENCOUNTER
Patient wife called back requesting something for the pain until he is able to get in     Please confirm with wife Susan [phone]    Ok to leave message and pharmacy is loaded

## 2017-11-15 ENCOUNTER — TELEPHONE (OUTPATIENT)
Dept: PODIATRY | Facility: CLINIC | Age: 42
End: 2017-11-15

## 2017-11-15 NOTE — TELEPHONE ENCOUNTER
Spoke with Susan, pt is unable to see us on Friday so made an appointment next week with Jennifer as they want to be seen ASAP.    BOB Edwards MA November 15, 2017 12:18 PM

## 2017-11-15 NOTE — TELEPHONE ENCOUNTER
Susan called requesting to have this patient seen soon, or some type of pain medication to carry him over until he can be seen.     Please advise. Also, see irene telephone encounters.

## 2017-11-18 ENCOUNTER — OFFICE VISIT (OUTPATIENT)
Dept: ORTHOPEDICS | Facility: CLINIC | Age: 42
End: 2017-11-18

## 2017-11-18 DIAGNOSIS — M79.674 GREAT TOE PAIN, RIGHT: Primary | ICD-10-CM

## 2017-11-18 NOTE — LETTER
"11/18/2017       RE: Gerry Palafox  [address] [place] [address]     Dear Colleague,    Thank you for referring your patient, Gerry Palafox, to the [facility] at [facility]. Please see a copy of my visit note below.    Patient is a 42 year old male who presents to the office today for:  Right great toe pain. Pain under right mtp. Mild swelling. Naproxen/ibuprofen/diclofenac notehelping. H/o sesamoiditis most recently past spring. Resolved with footwear modifications. Current episode for past month. Much more painful. Pain with walking. Some mild throbbing pain at rest. Has tried footwear modification without relief.  Reports that he has previously been evaluated for gout but negative.     He is a  and is on his feet for 10-12 hours 4 nights a week.  Denies weakness, numbness, tingling, clicking, locking, or catching.        Past Medical History, Current Medications, and Allergies are reviewed in the electronic medical record as appropriate.      ROS: Pertinent items are noted in HPI.  Constitutional: negative for fevers, chills and malaise  Cardiovascular: negative for dyspnea, fatigue, lower extremity edema  Integument/breast: negative for rash, skin lesion(s) and skin color change  Neurological: negative for paresthesia and weakness      EXAM:Ht 5' 4\" (1.626 m)  Wt 220 lb (99.8 kg)  BMI 37.76 kg/m2       The patient is alert in no acute distress, pleasant and conversational.    Gait: antalgic gait    Right foot  Skin is intact, no erythema or ecchymosis.  Surgical scar over lateral ankle with some edema in this are that patient reports is normal.  Mild swelling at base of first mtp.     +2/4 dorsalis pedis pulse, sensation is grossly intact throughout foot and ankle.    AROM: can flex and extend great toe at mtp and ip against resistance but pain with extension     Extremely tender to palpation over plantar surface of first mtp as well as on " the dorsal and lateral aspects.   No tenderness to palpation of the medial or lateral malleolus.  No tenderness to palpation of the ATFL, PTFL or CFL.  No tenderness palpation of the peroneal tendon, posterior tibialis tendon or Achilles tendon    Calf is soft and nontender, no tenderness to palpation of the midfoot or the Lisfranc joint, no tenderness to palpation of the base of fifth metatarsal.       Radiographs: none    Assessment: Patient is a 42 year old male with  right great toe pain and sesamoiditis    Recommendations:   After discussion of options, patient opts of injection today. Plan for injection of mtp joint as there seems to be some overall inflammation of the joint. Recommended stiffer soled shoes or orthotics for at least the next week. Offered boot or post op shoe but patient declines. May consider specific sesamoid injection if no improvement.       PROCEDURE: first mtp injection   The patient was apprised of the risks and the benefits of the procedure written consent was signed by the patient.   Landmarks were located on the right great toe and the area was marked and cleaned with chlorhexadine swab.   Using no touch technique, the skin was anesthetized with ethyl chloride spray, 20 mg of triamcinolone along with 0.5 mL of 1% lidocaine  was injected easily through a 1.0-inch 25-gauge needle in the MTP joint space.   There were no complications. The patient tolerated the procedure well. There was minimal bleeding.   The patient was instructed to ice the toe upon leaving clinic and refrain from overuse over the next 2 days.   The patient was instructed to go to the emergency room with any usual pain, swelling, or redness occurred in the injected area.   Tiago Ray MD

## 2017-11-18 NOTE — MR AVS SNAPSHOT
"              After Visit Summary   11/18/2017    Gerry Palafox    MRN: [id number]           Patient Information     Date Of Birth          1975        Visit Information        Provider Department      11/18/2017 6:40 PM Tiago Ray MD [facility]        Today's Diagnoses     Great toe pain, right    -  1       Follow-ups after your visit        Who to contact     Please call your clinic at [phone] to:    Ask questions about your health    Make or cancel appointments    Discuss your medicines    Learn about your test results    Speak to your doctor   If you have compliments or concerns about an experience at your clinic, or if you wish to file a complaint, please contact [facility] Patient Relations at [phone] or email us at [email or web address]         Additional Information About Your Visit        MyChart Information     [employer] gives you secure access to your electronic health record. If you see a primary care provider, you can also send messages to your care team and make appointments. If you have questions, please call your primary care clinic.  If you do not have a primary care provider, please call [phone] and they will assist you.      [employer] is an electronic gateway that provides easy, online access to your medical records. With [employer], you can request a clinic appointment, read your test results, renew a prescription or communicate with your care team.     To access your existing account, please contact your [facility] or call [phone] for assistance.        Care EveryWhere ID     This is your Care EveryWhere ID. This could be used by other organizations to access your [place] medical records  [id number]        Your Vitals Were     Height BMI (Body Mass Index)                5' 4\" (1.626 m) 37.76 kg/m2           Blood Pressure from Last 3 Encounters:   06/12/17 128/77   05/25/17 " 114/82   01/17/15 145/87    Weight from Last 3 Encounters:   11/19/17 220 lb (99.8 kg)   11/18/17 220 lb (99.8 kg)   06/12/17 215 lb (97.5 kg)              We Performed the Following     DRAIN/INJECT SMALL JOINT/BURSA (Finger, Heel)        Primary Care Provider Office Phone # Fax #    [facility] [phone]       [address] [facility] [address]  [facility] [address]        Equal Access to Services     [facility]: Hadii aad ku hadasho Soomaali, waaxda luqadaha, qaybta kaalmada adeegyada, waxfrankie bird haybharti griffin . So [facility] [phone].    ATENCIÓN: Si habla español, tiene a gonzalez disposición servicios gratuitos de asistencia lingüística. [facility] [phone].    We comply with applicable federal civil rights laws and [place] laws. We do not discriminate on the basis of race, color, national origin, age, disability, sex, sexual orientation, or gender identity.            Thank you!     Thank you for choosing [facility]  for your care. Our goal is always to provide you with excellent care. Hearing back from our patients is one way we can continue to improve our services. Please take a few minutes to complete the written survey that you may receive in the mail after your visit with us. Thank you!             Your Updated Medication List - Protect others around you: Learn how to safely use, store and throw away your medicines at www.disposemymeds.org.          This list is accurate as of: 11/18/17 11:59 PM.  Always use your most recent med list.                   Brand Name Dispense Instructions for use Diagnosis    * albuterol 108 (90 BASE) MCG/ACT Inhaler    PROAIR HFA/PROVENTIL HFA/VENTOLIN HFA    3 Inhaler    Inhale 2 puffs into the lungs every 6 hours as needed for shortness of breath / dyspnea or wheezing    Mild intermittent asthma, uncomplicated       * albuterol (2.5 MG/3ML) 0.083% neb solution     30 vial    Take 1 vial (2.5 mg) by nebulization every 6  hours as needed for shortness of breath / dyspnea or wheezing    Asthma flare, Mild persistent asthma without complication       DAILY MULTIVITAMIN PO           diclofenac 75 MG EC tablet    VOLTAREN    90 tablet    Take 1 tablet (75 mg) by mouth 2 times daily as needed for moderate pain    Left foot pain       DICLOFENAC PO           fluticasone 44 MCG/ACT Inhaler    FLOVENT HFA    3 Inhaler    Inhale 2 puffs into the lungs 2 times daily    Mild intermittent asthma, uncomplicated       glucosamine-chondroitin 500-400 MG Caps per capsule      Take 1 capsule by mouth daily        IBUPROFEN PO      Take 400 mg by mouth 2 times daily as needed for moderate pain        loratadine 10 MG tablet    CLARITIN     Take 10 mg by mouth daily        montelukast 10 MG tablet    SINGULAIR    90 tablet    Take 1 tablet (10 mg) by mouth At Bedtime    Moderate asthma       order for DME     1 Device    Equipment being ordered: Nebulizer    Asthma flare, Mild persistent asthma       * Notice:  This list has 2 medication(s) that are the same as other medications prescribed for you. Read the directions carefully, and ask your doctor or other care provider to review them with you.

## 2017-11-19 VITALS — BODY MASS INDEX: 37.56 KG/M2 | WEIGHT: 220 LBS | HEIGHT: 64 IN

## 2017-11-19 NOTE — NURSING NOTE
[address] [address]  Dept: [phone]  ______________________________________________________________________________    Patient: Gerry Palafox   : 1975   MRN: [id number]   2017    INVASIVE PROCEDURE SAFETY CHECKLIST    Date: 2017   Procedure: U/S guided Right great toe sesamoid injection   Patient Name: Gerry Palafox  MRN: [id number]  YOB: 1975    Action: Complete sections as appropriate. Any discrepancy results in a HARD COPY until resolved.     PRE PROCEDURE:  Patient ID verified with 2 identifiers (name and  or MRN): Yes  Procedure and site verified with patient/designee (when able): Yes  Accurate consent documentation in medical record: Yes  H&P (or appropriate assessment) documented in medical record: Yes  H&P must be up to 20 days prior to procedure and updates within 24 hours of procedure as applicable: Yes  Relevant diagnostic and radiology test results appropriately labeled and displayed as applicable: Yes  Procedure site(s) marked with provider initials: NA    TIMEOUT:  Time-Out performed immediately prior to starting procedure, including verbal and active participation of all team members addressing the following:Yes  * Correct patient identify  * Confirmed that the correct side and site are marked  * An accurate procedure consent form  * Agreement on the procedure to be done  * Correct patient position  * Relevant images and results are properly labeled and appropriately displayed  * The need to administer antibiotics or fluids for irrigation purposes during the procedure as applicable   * Safety precautions based on patient history or medication use    DURING PROCEDURE: Verification of correct person, site, and procedures any time the responsibility for care of the patient is transferred to another member of the care team.     The following medication was given:     MEDICATION:  Kenalog 40  mg  ROUTE: IA  SITE: Right great toe  DOSE: .5mL  LOT #: [id number]  : [email or web address]  EXPIRATION DATE: 02/2019  NDC#: 4746-5682-40   Was there drug waste? Yes  Amount of drug waste (mL): .5mL.  Reason for waste:  Single use vial    MEDICATION:  Lidocaine without epinephrine  ROUTE: IA  SITE: Right great toe  DOSE: .5mL  LOT #: [id number]  : [email or web address]  EXPIRATION DATE: 07/21  NDC#: 98294-622-58   Was there drug waste? Yes  Amount of drug waste (mL): 19.5ml.  Reason for waste:  Single use vial    Ranjana Saleh, ATC  November 19, 2017

## 2017-11-19 NOTE — PROGRESS NOTES
"Patient is a 42 year old male who presents to the office today for:  Right great toe pain. Pain under right mtp. Mild swelling. Naproxen/ibuprofen/diclofenac notehelping. H/o sesamoiditis most recently past spring. Resolved with footwear modifications. Current episode for past month. Much more painful. Pain with walking. Some mild throbbing pain at rest. Has tried footwear modification without relief.  Reports that he has previously been evaluated for gout but negative.     He is a  and is on his feet for 10-12 hours 4 nights a week.  Denies weakness, numbness, tingling, clicking, locking, or catching.        Past Medical History, Current Medications, and Allergies are reviewed in the electronic medical record as appropriate.      ROS: Pertinent items are noted in HPI.  Constitutional: negative for fevers, chills and malaise  Cardiovascular: negative for dyspnea, fatigue, lower extremity edema  Integument/breast: negative for rash, skin lesion(s) and skin color change  Neurological: negative for paresthesia and weakness      EXAM:Ht 5' 4\" (1.626 m)  Wt 220 lb (99.8 kg)  BMI 37.76 kg/m2       The patient is alert in no acute distress, pleasant and conversational.    Gait: antalgic gait    Right foot  Skin is intact, no erythema or ecchymosis.  Surgical scar over lateral ankle with some edema in this are that patient reports is normal.  Mild swelling at base of first mtp.     +2/4 dorsalis pedis pulse, sensation is grossly intact throughout foot and ankle.    AROM: can flex and extend great toe at mtp and ip against resistance but pain with extension     Extremely tender to palpation over plantar surface of first mtp as well as on the dorsal and lateral aspects.   No tenderness to palpation of the medial or lateral malleolus.  No tenderness to palpation of the ATFL, PTFL or CFL.  No tenderness palpation of the peroneal tendon, posterior tibialis tendon or Achilles tendon    Calf is soft and nontender, no " tenderness to palpation of the midfoot or the Lisfranc joint, no tenderness to palpation of the base of fifth metatarsal.       Radiographs: none    Assessment: Patient is a 42 year old male with right great toe pain and sesamoiditis    Recommendations:   After discussion of options, patient opts of injection today. Plan for injection of mtp joint as there seems to be some overall inflammation of the joint. Recommended stiffer soled shoes or orthotics for at least the next week. Offered boot or post op shoe but patient declines. May consider specific sesamoid injection if no improvement.       PROCEDURE: first mtp injection   The patient was apprised of the risks and the benefits of the procedure written consent was signed by the patient.   Landmarks were located on the right great toe and the area was marked and cleaned with chlorhexadine swab.   Using no touch technique, the skin was anesthetized with ethyl chloride spray, 20 mg of triamcinolone along with 0.5 mL of 1% lidocaine  was injected easily through a 1.0-inch 25-gauge needle in the MTP joint space.   There were no complications. The patient tolerated the procedure well. There was minimal bleeding.   The patient was instructed to ice the toe upon leaving clinic and refrain from overuse over the next 2 days.   The patient was instructed to go to the emergency room with any usual pain, swelling, or redness occurred in the injected area.   Tiago Ray MD

## 2017-12-22 DIAGNOSIS — J45.909 MODERATE ASTHMA: ICD-10-CM

## 2017-12-22 NOTE — TELEPHONE ENCOUNTER
MONTELUKAST SOD 10 MG TABLET        Last Written Prescription Date:  5/25/17  Last Fill Quantity: 90,   # refills: 1  Last Office Visit: 5/25/17  Future Office visit:       Routing refill request to provider for review/approval because:  Does not meet protocol criteria

## 2017-12-26 RX ORDER — MONTELUKAST SODIUM 10 MG/1
TABLET ORAL
Qty: 90 TABLET | Refills: 1 | Status: SHIPPED | OUTPATIENT
Start: 2017-12-26 | End: 2021-02-24

## 2017-12-26 NOTE — TELEPHONE ENCOUNTER
Prescription approved per [facility].    Jaleesa Perea, OMEGAN, RN  [facility]

## 2017-12-28 DIAGNOSIS — G47.33 OSA (OBSTRUCTIVE SLEEP APNEA): Primary | ICD-10-CM

## 2018-01-29 ENCOUNTER — OFFICE VISIT (OUTPATIENT)
Dept: ORTHOPEDICS | Facility: CLINIC | Age: 43
End: 2018-01-29
Payer: COMMERCIAL

## 2018-01-29 VITALS
SYSTOLIC BLOOD PRESSURE: 127 MMHG | DIASTOLIC BLOOD PRESSURE: 89 MMHG | WEIGHT: 220 LBS | HEIGHT: 64 IN | HEART RATE: 73 BPM | BODY MASS INDEX: 37.56 KG/M2

## 2018-01-29 DIAGNOSIS — M25.80 SESAMOIDITIS: Primary | ICD-10-CM

## 2018-01-29 NOTE — LETTER
1/29/2018       RE: Gerry Palafox  [address] [place] [address]     Dear Colleague,    Thank you for referring your patient, Gerry Palafox, to the [facility] SPORTS AND ORTHOPAEDIC WALK IN CLINIC at [facility]. Please see a copy of my visit note below.          SPORTS & ORTHOPEDIC WALK-IN FOLLOW-UP VISIT 1/29/2018    Interval History:     Follow up reason: Right great toe pain, had 1st MTP CSI on 11/18/17    Date of injury: N/A, Chronic gradual onset    Date last seen: 11/18/17    Following Therapeutic Plan: Yes     Pain: Improving- great relief from CSI until Saturday. Unsure of what happened    Function: Improving    Interval History: N/A    Medical History:    Any recent changes to your medical history? No    Any new medication prescribed since last visit? No             [facility] Sports and Orthopedic Walk-in Clinic Note      Patient is a 42 year old male who presents to the office today for: Right great toe pain.  Was seen on 11/18/2017 and given injection into first MTP joint.  Shortly after the injection the patient reports complete relief of symptoms.  Has been relatively pain-free for the last 2 months however over the last 2 days pain has become increasing steadily.  Similar to previous localizes to plantar aspect of right first MTP.  Carries prior diagnosis of sesamoiditis.  Believes he has been tested for gout in the past and was negative.  Has been using diclofenac, icing, dancers pads with little to no relief.  Has not noted any particular swelling, redness or warmth.  Otherwise feeling well.    Past Medical History, Current Medications, and Allergies are reviewed in the electronic medical record as appropriate.     ROS: Pertinent items are noted in HPI.  Constitutional: negative for fevers, chills and malaise  Cardiovascular: negative for dyspnea, fatigue, lower extremity edema  Integument/breast: negative for rash, skin lesion(s) and skin color  "change  Neurological: negative for paresthesia and weakness      EXAM:/89  Pulse 73  Ht 5' 4\" (1.626 m)  Wt 220 lb (99.8 kg)  BMI 37.76 kg/m2    General: Alert, pleasant, no distress  Right foot: Warm and well-perfused.  Sensation is intact to light touch.  Patient has significant tenderness to  palpation on the plantar surface of the first MTP joint.  No swelling, erythema, warmth of the joint.  No tenderness on the lateral or dorsal aspects of the MTP joint.  Patient has pain with passive extension at the MTP joint.  Strength5/5 in flexion and extension at the IP and MTP joints of the first toe.  No tenderness to palpation of the other MTP joints or metatarsals.    Radiographs: none new      Assessment: Patient is a 42 year old male with recurrent sesamoiditis.  Again discussed the possibility of gout and recommended evaluation by primary care doctor when he is asymptomatic though at the current time the localized tenderness, seems more consistent with sesamoiditis.    Recommendations:   Discussed that given his recent MTP injection would like to avoid injecting this joint again at the current time.  However given his more localized symptoms during this visit discussed the possibility of a targeted sesamoid injection.  The patient opts for this today.  Please see note below.  Recommended resting from strenuous activity for the next 48 hours at least after the injection.  Regular icing for the next 2 days.  May use naproxen or diclofenac for pain.  He will also likely benefit from stiffer soled shoes with wider toe box and possibly continued use of padding or doughnut to relieve pressure in this area.    iTago Ray MD      PROCEDURE: Right first metatarsal sesamoid injection   The patient was apprised of the risks and the benefits to include infection, hypopigmentation of the skin, dimpling or fat atrophy, and possibly injury to the tendon and written consent was signed by the patient.   Bones as well " as the underlying metatarsal head were visualized with ultrasound and the landmarks were marked with a pen.  The area was cleansed with a chlorhexidine swab.  Roughly 1.5 mL 1% lidocaine without epinephrine was used for anesthesia.  Using sterile technique the sesamoids were again visualized and under direct ultrasound guidance a 25-gauge needle was introduced into the space between the sesamoids and first metatarsal head.  20 mg Kenalog and 0.5 mL 1% lidocaine were injected into this area.  There were no complications. The patient tolerated the procedure well. There was minimal bleeding.   The patient was instructed to ice the toe upon leaving clinic and refrain from overuse over the next 2 days.   The patient was instructed to go to the emergency room with any usual pain, swelling, or redness occurred in the injected area.     Tiago Ray MD

## 2018-01-29 NOTE — PROGRESS NOTES
SPORTS & ORTHOPEDIC WALK-IN FOLLOW-UP VISIT 1/29/2018    Interval History:     Follow up reason: Right great toe pain, had 1st MTP CSI on 11/18/17    Date of injury: N/A, Chronic gradual onset    Date last seen: 11/18/17    Following Therapeutic Plan: Yes     Pain: Improving- great relief from CSI until Saturday. Unsure of what happened    Function: Improving    Interval History: N/A    Medical History:    Any recent changes to your medical history? No    Any new medication prescribed since last visit? No

## 2018-01-29 NOTE — NURSING NOTE
[address] [address]  Dept: [phone]  ______________________________________________________________________________    Patient: Gerry Palafox   : 1975   MRN: [id number]   2018    INVASIVE PROCEDURE SAFETY CHECKLIST    Date: 18    Procedure: Right 1st MTP jt Kenalog CSI  Patient Name: Gerry Palafox  MRN: [id number]  YOB: 1975    Action: Complete sections as appropriate. Any discrepancy results in a HARD COPY until resolved.     PRE PROCEDURE:  Patient ID verified with 2 identifiers (name and  or MRN): Yes  Procedure and site verified with patient/designee (when able): Yes  Accurate consent documentation in medical record: Yes  H&P (or appropriate assessment) documented in medical record: Yes  H&P must be up to 20 days prior to procedure and updates within 24 hours of procedure as applicable: Yes  Relevant diagnostic and radiology test results appropriately labeled and displayed as applicable: Yes  Procedure site(s) marked with provider initials: NA    TIMEOUT:  Time-Out performed immediately prior to starting procedure, including verbal and active participation of all team members addressing the following:Yes  * Correct patient identify  * Confirmed that the correct side and site are marked  * An accurate procedure consent form  * Agreement on the procedure to be done  * Correct patient position  * Relevant images and results are properly labeled and appropriately displayed  * The need to administer antibiotics or fluids for irrigation purposes during the procedure as applicable   * Safety precautions based on patient history or medication use    DURING PROCEDURE: Verification of correct person, site, and procedures any time the responsibility for care of the patient is transferred to another member of the care team.     The following medication was given:     MEDICATION:  Lidocaine without epinephrine  ROUTE:  IA  SITE: Right 1st MTP  DOSE: 1cc - 3cc for numbing  LOT #: [id number]  : [employer]  EXPIRATION DATE: 09/21  NDC#: 01551-380-12   Was there drug waste? Yes  Amount of drug waste (mL): 16.  Reason for waste:  Single use vial     MEDICATION:  Kenalog 40 mg  ROUTE: IA  SITE: R 1st MTP  DOSE: 1cc  LOT #: [id number]  : [employer]  EXPIRATION DATE: 05/2019  NDC#: 8963-4541-53   Was there drug waste? No      Ranjana Saleh, ATC  January 29, 2018

## 2018-01-29 NOTE — MR AVS SNAPSHOT
After Visit Summary   1/29/2018    Gerry Palafox    MRN: [id number]           Patient Information     Date Of Birth          1975        Visit Information        Provider Department      1/29/2018 11:00 AM Tiago Ray MD [facility] Sports and Orthopaedic Walk In Clinic        Care Instructions        Try Naproxen(aleve) two tabs twice daily with food for two weeks in place of diclofenac  Ice to foot  today and then daily as needed  May use tylenol or ibuprofen as needed  No pools or hot tubs for 48 hours  May do Physical Therapy as tolerated  Follow up as needed  Return to clinic with severe pain, warmth from the joint, or redness, as these may be signs of infection after your injection.                 Follow-ups after your visit        Who to contact     Please call your clinic at [phone] to:    Ask questions about your health    Make or cancel appointments    Discuss your medicines    Learn about your test results    Speak to your doctor   If you have compliments or concerns about an experience at your clinic, or if you wish to file a complaint, please contact [facility] Patient Relations at [phone] or email us at [email or web address]         Additional Information About Your Visit        MyChart Information     [employer] gives you secure access to your electronic health record. If you see a primary care provider, you can also send messages to your care team and make appointments. If you have questions, please call your primary care clinic.  If you do not have a primary care provider, please call [phone] and they will assist you.      [employer] is an electronic gateway that provides easy, online access to your medical records. With [employer], you can request a clinic appointment, read your test results, renew a prescription or communicate with your care team.     To access your existing account, please contact your [facility] or call [phone] for assistance.        Care EveryWhere ID     This is your Care EveryWhere ID. This could be used by other organizations to access your [place] medical records  [id number]        Your Vitals Were     Pulse Height BMI (Body Mass Index)             73 5' 4\" (1.626 m) 37.76 kg/m2          Blood Pressure from Last 3 Encounters:   01/29/18 127/89   06/12/17 128/77   05/25/17 114/82    Weight from Last 3 Encounters:   01/29/18 220 lb (99.8 kg)   11/19/17 220 lb (99.8 kg)   11/18/17 220 lb (99.8 kg)              Today, you had the following     No orders found for display       Primary Care Provider Office Phone # Fax #    Pooja Boothcaren Rodriguez, CHARO [facility] [phone]       [address]  [facility] [address]        Equal Access to Services     SHEY FERNANDEZ : Hadii aad ku hadasho Soomaali, waaxda luqadaha, qaybta kaalmada adeegyada, waxay eunin haybrittneen kelley griffin . So [facility] [phone].    ATENCIÓN: Si leidy quiros, tiene a gonzalez disposición servicios gratuitos de asistencia lingüística. Llame al [phone].    We comply with applicable federal civil rights laws and [place] laws. We do not discriminate on the basis of race, color, national origin, age, disability, sex, sexual orientation, or gender identity.            Thank you!     Thank you for choosing [facility] SPORTS AND ORTHOPAEDIC WALK IN CLINIC  for your care. Our goal is always to provide you with excellent care. Hearing back from our patients is one way we can continue to improve our services. Please take a few minutes to complete the written survey that you may receive in the mail after your visit with us. Thank you!             Your Updated Medication List - Protect others around you: Learn how to safely use, store and throw away your medicines at www.disposemymeds.org.          This list is accurate as of 1/29/18  1:02 PM.  Always use your most recent med list.                   Brand Name " Dispense Instructions for use Diagnosis    * albuterol 108 (90 BASE) MCG/ACT Inhaler    PROAIR HFA/PROVENTIL HFA/VENTOLIN HFA    3 Inhaler    Inhale 2 puffs into the lungs every 6 hours as needed for shortness of breath / dyspnea or wheezing    Mild intermittent asthma, uncomplicated       * albuterol (2.5 MG/3ML) 0.083% neb solution     30 vial    Take 1 vial (2.5 mg) by nebulization every 6 hours as needed for shortness of breath / dyspnea or wheezing    Asthma flare, Mild persistent asthma without complication       DAILY MULTIVITAMIN PO           diclofenac 75 MG EC tablet    VOLTAREN    90 tablet    Take 1 tablet (75 mg) by mouth 2 times daily as needed for moderate pain    Left foot pain       DICLOFENAC PO           fluticasone 44 MCG/ACT Inhaler    FLOVENT HFA    3 Inhaler    Inhale 2 puffs into the lungs 2 times daily    Mild intermittent asthma, uncomplicated       glucosamine-chondroitin 500-400 MG Caps per capsule      Take 1 capsule by mouth daily        IBUPROFEN PO      Take 400 mg by mouth 2 times daily as needed for moderate pain        loratadine 10 MG tablet    CLARITIN     Take 10 mg by mouth daily        montelukast 10 MG tablet    SINGULAIR    90 tablet    TAKE 1 TABLET (10 MG) BY MOUTH AT BEDTIME    Moderate asthma       order for DME     1 Device    Equipment being ordered: Nebulizer    Asthma flare, Mild persistent asthma       * Notice:  This list has 2 medication(s) that are the same as other medications prescribed for you. Read the directions carefully, and ask your doctor or other care provider to review them with you.

## 2018-01-29 NOTE — PATIENT INSTRUCTIONS
Try Naproxen(aleve) two tabs twice daily with food for two weeks in place of diclofenac  Ice to foot  today and then daily as needed  May use tylenol or ibuprofen as needed  No pools or hot tubs for 48 hours  May do Physical Therapy as tolerated  Follow up as needed  Return to clinic with severe pain, warmth from the joint, or redness, as these may be signs of infection after your injection.

## 2018-01-30 RX ORDER — TRIAMCINOLONE ACETONIDE 40 MG/ML
20 INJECTION, SUSPENSION INTRA-ARTICULAR; INTRAMUSCULAR ONCE
Qty: 0.5 ML | Refills: 0 | OUTPATIENT
Start: 2018-01-30 | End: 2018-01-30

## 2018-01-30 NOTE — PROGRESS NOTES
"[facility] Sports and Orthopedic Walk-in Clinic Note      Patient is a 42 year old male who presents to the office today for: Right great toe pain.  Was seen on 11/18/2017 and given injection into first MTP joint.  Shortly after the injection the patient reports complete relief of symptoms.  Has been relatively pain-free for the last 2 months however over the last 2 days pain has become increasing steadily.  Similar to previous localizes to plantar aspect of right first MTP.  Carries prior diagnosis of sesamoiditis.  Believes he has been tested for gout in the past and was negative.  Has been using diclofenac, icing, dancers pads with little to no relief.  Has not noted any particular swelling, redness or warmth.  Otherwise feeling well.    Past Medical History, Current Medications, and Allergies are reviewed in the electronic medical record as appropriate.     ROS: Pertinent items are noted in HPI.  Constitutional: negative for fevers, chills and malaise  Cardiovascular: negative for dyspnea, fatigue, lower extremity edema  Integument/breast: negative for rash, skin lesion(s) and skin color change  Neurological: negative for paresthesia and weakness      EXAM:/89  Pulse 73  Ht 5' 4\" (1.626 m)  Wt 220 lb (99.8 kg)  BMI 37.76 kg/m2    General: Alert, pleasant, no distress  Right foot: Warm and well-perfused.  Sensation is intact to light touch.  Patient has significant tenderness to  palpation on the plantar surface of the first MTP joint.  No swelling, erythema, warmth of the joint.  No tenderness on the lateral or dorsal aspects of the MTP joint.  Patient has pain with passive extension at the MTP joint.  Strength5/5 in flexion and extension at the IP and MTP joints of the first toe.  No tenderness to palpation of the other MTP joints or metatarsals.    Radiographs: none new      Assessment: Patient is a 42 year old male with recurrent sesamoiditis.  Again discussed the possibility of gout and recommended " evaluation by primary care doctor when he is asymptomatic though at the current time the localized tenderness, seems more consistent with sesamoiditis.    Recommendations:   Discussed that given his recent MTP injection would like to avoid injecting this joint again at the current time.  However given his more localized symptoms during this visit discussed the possibility of a targeted sesamoid injection.  The patient opts for this today.  Please see note below.  Recommended resting from strenuous activity for the next 48 hours at least after the injection.  Regular icing for the next 2 days.  May use naproxen or diclofenac for pain.  He will also likely benefit from stiffer soled shoes with wider toe box and possibly continued use of padding or doughnut to relieve pressure in this area.    Tiago Ray MD      PROCEDURE: Right first metatarsal sesamoid injection   The patient was apprised of the risks and the benefits to include infection, hypopigmentation of the skin, dimpling or fat atrophy, and possibly injury to the tendon and written consent was signed by the patient.   Bones as well as the underlying metatarsal head were visualized with ultrasound and the landmarks were marked with a pen.  The area was cleansed with a chlorhexidine swab.  Roughly 1.5 mL 1% lidocaine without epinephrine was used for anesthesia.  Using sterile technique the sesamoids were again visualized and under direct ultrasound guidance a 25-gauge needle was introduced into the space between the sesamoids and first metatarsal head.  20 mg Kenalog and 0.5 mL 1% lidocaine were injected into this area.  There were no complications. The patient tolerated the procedure well. There was minimal bleeding.   The patient was instructed to ice the toe upon leaving clinic and refrain from overuse over the next 2 days.   The patient was instructed to go to the emergency room with any usual pain, swelling, or redness occurred in the injected area.      Tiago Ray MD

## 2018-05-08 NOTE — PROGRESS NOTES
SUBJECTIVE:   Gerry Palafox is a 42 year old male who presents to clinic today for the following health issues:    ABDOMINAL PAIN     Onset: 1 week    Description:   Character: Dull ache, swelling, redness, feels bulging at umbilicus  Location: ervin-umbilical region  Radiation: None    Intensity: mild    Progression of Symptoms:  improving    Accompanying Signs & Symptoms:  Fever/Chills?: no   Gas/Bloating: no   Cough: YES  Nausea: Yes after rock climbing last wednesday  Vomitting: no   Diarrhea?: no   Constipation:no   Dysuria or Hematuria: no    History:   Trauma: no   Previous similar pain: YES- a few months ago    Previous tests done: none    Precipitating factors:   Does the pain change with:     Food: YES     BM: YES    Urination: no   Hurts more with lifting; feels pressure and bulging at umbilicus    Alleviating factors:  Nothing tried     Therapies Tried and outcome: n/a    LMP:  not applicable       -------------------------------------    Problem list and histories reviewed & adjusted, as indicated.  Additional history: as documented    Patient Active Problem List   Diagnosis     Hyperlipidemia LDL goal <160     TERRIE (obstructive sleep apnea) AHI 21.8     Pain in joint, ankle and foot     Other postprocedural status(V45.89)     Mild persistent asthma     History reviewed. No pertinent surgical history.    Social History   Substance Use Topics     Smoking status: Never Smoker     Smokeless tobacco: Never Used     Alcohol use Yes      Comment: varies     Family History   Problem Relation Age of Onset     Arthritis Mother      CANCER Father      lung cancer      Other            Reviewed and updated as needed this visit by clinical staff       Reviewed and updated as needed this visit by Provider         ROS:  Constitutional, HEENT, cardiovascular, pulmonary, gi and gu systems are negative, except as otherwise noted.    OBJECTIVE:     /77 (BP Location: Left arm, Patient Position: Sitting, Cuff Size:  Adult Large)  Pulse 92  Temp 98  F (36.7  C) (Oral)  Wt 204 lb (92.5 kg)  SpO2 98%  BMI 35.02 kg/m2  Body mass index is 35.02 kg/(m^2).   GENERAL: healthy, alert and no distress  NECK: no adenopathy, no asymmetry, masses, or scars and thyroid normal to palpation  RESP: lungs clear to auscultation - no rales, rhonchi or wheezes  CV: regular rate and rhythm, normal S1 S2, no S3 or S4, no murmur, click or rub, no peripheral edema and peripheral pulses strong  ABDOMEN: bowel sounds normal and hernia ventral and umbilical  MS: no gross musculoskeletal defects noted, no edema    Diagnostic Test Results:  none     ASSESSMENT/PLAN:     Problem List Items Addressed This Visit     None      Visit Diagnoses     Umbilical hernia without obstruction and without gangrene    -  Primary    Relevant Orders    US Abdomen Complete    GENERAL SURG ADULT REFERRAL    Ventral hernia without obstruction or gangrene        Relevant Orders    US Abdomen Complete    GENERAL SURG ADULT REFERRAL       recommended restricting lifting  CHARO Shepard CNP  [facility] [place]  Please note greater than 50% of this 30 minute appointment were spent in face to face counseling with the patient of the issues described above in the history of present illness and in the plan, including referrals and imaging

## 2018-05-09 ENCOUNTER — OFFICE VISIT (OUTPATIENT)
Dept: FAMILY MEDICINE | Facility: CLINIC | Age: 43
End: 2018-05-09
Payer: COMMERCIAL

## 2018-05-09 VITALS
HEART RATE: 92 BPM | WEIGHT: 204 LBS | DIASTOLIC BLOOD PRESSURE: 77 MMHG | SYSTOLIC BLOOD PRESSURE: 132 MMHG | OXYGEN SATURATION: 98 % | TEMPERATURE: 98 F | BODY MASS INDEX: 35.02 KG/M2

## 2018-05-09 DIAGNOSIS — K42.9 UMBILICAL HERNIA WITHOUT OBSTRUCTION AND WITHOUT GANGRENE: Primary | ICD-10-CM

## 2018-05-09 DIAGNOSIS — K43.9 VENTRAL HERNIA WITHOUT OBSTRUCTION OR GANGRENE: ICD-10-CM

## 2018-05-09 PROCEDURE — 99214 OFFICE O/P EST MOD 30 MIN: CPT | Performed by: NURSE PRACTITIONER

## 2018-05-09 NOTE — MR AVS SNAPSHOT
After Visit Summary   5/9/2018    Gerry Palafox    MRN: [id number]           Patient Information     Date Of Birth          1975        Visit Information        Provider Department      5/9/2018 1:00 PM Pooja Rodriguez APRN CNP [facility]        Today's Diagnoses     Umbilical hernia without obstruction and without gangrene    -  1    Ventral hernia without obstruction or gangrene           Follow-ups after your visit        Additional Services     GENERAL SURG ADULT REFERRAL       Your provider has referred you to: [facility]: General Surgery Clinic [facility] [phone]   [email or web address]    Please be aware that coverage of these services is subject to the terms and limitations of your health insurance plan.  Call member services at your health plan with any benefit or coverage questions.      Please bring the following with you to your appointment:    (1) Any X-Rays, CTs or MRIs which have been performed.  Contact the facility where they were done to arrange for  prior to your scheduled appointment.   (2) List of current medications   (3) This referral request   (4) Any documents/labs given to you for this referral                  Future tests that were ordered for you today     Open Future Orders        Priority Expected Expires Ordered    US Abdomen Complete Routine  5/9/2019 5/9/2018            Who to contact     If you have questions or need follow up information about today's clinic visit or your schedule please contact [facility] directly at [phone].  Normal or non-critical lab and imaging results will be communicated to you by MyChart, letter or phone within 4 business days after the clinic has received the results. If you do not hear from us within 7 days, please contact the clinic through MyChart or phone. If you have a critical or abnormal lab result, we will notify you by phone as soon as  possible.  Submit refill requests through [employer] or call your pharmacy and they will forward the refill request to us. Please allow 3 business days for your refill to be completed.          Additional Information About Your Visit        [employer] Information     [employer] gives you secure access to your electronic health record. If you see a primary care provider, you can also send messages to your care team and make appointments. If you have questions, please call your primary care clinic.  If you do not have a primary care provider, please call [phone] and they will assist you.        Care EveryWhere ID     This is your Care EveryWhere ID. This could be used by other organizations to access your [facility] medical records  [id number]        Your Vitals Were     Pulse Temperature Pulse Oximetry BMI (Body Mass Index)          92 98  F (36.7  C) (Oral) 98% 35.02 kg/m2         Blood Pressure from Last 3 Encounters:   05/09/18 132/77   01/29/18 127/89   06/12/17 128/77    Weight from Last 3 Encounters:   05/09/18 204 lb (92.5 kg)   01/29/18 220 lb (99.8 kg)   11/19/17 220 lb (99.8 kg)              We Performed the Following     GENERAL SURG ADULT REFERRAL          Today's Medication Changes          These changes are accurate as of 5/9/18  1:19 PM.  If you have any questions, ask your nurse or doctor.               Stop taking these medicines if you haven't already. Please contact your care team if you have questions.     DICLOFENAC PO   Stopped by:  Pooja Rodriguez APRN CNP                    Primary Care Provider Office Phone # Fax #    CHARO Shepard [phone]       [address] [facility] [address]  [facility] [address]        Equal Access to Services     LINDSEY FERNANDEZ : Hadii zenaida Angeles, kirby edwards, guillaume bowens. So [facility] [phone].    ATENCIÓN: Si habla español, tiene a gonzalez disposición servicios gratuitos de  asistencia lingüística. Saurav al [phone].    We comply with applicable federal civil rights laws and [place] laws. We do not discriminate on the basis of race, color, national origin, age, disability, sex, sexual orientation, or gender identity.            Thank you!     Thank you for choosing [facility]  for your care. Our goal is always to provide you with excellent care. Hearing back from our patients is one way we can continue to improve our services. Please take a few minutes to complete the written survey that you may receive in the mail after your visit with us. Thank you!             Your Updated Medication List - Protect others around you: Learn how to safely use, store and throw away your medicines at www.disposemymeds.org.          This list is accurate as of 5/9/18  1:19 PM.  Always use your most recent med list.                   Brand Name Dispense Instructions for use Diagnosis    * albuterol 108 (90 Base) MCG/ACT Inhaler    PROAIR HFA/PROVENTIL HFA/VENTOLIN HFA    3 Inhaler    Inhale 2 puffs into the lungs every 6 hours as needed for shortness of breath / dyspnea or wheezing    Mild intermittent asthma, uncomplicated       * albuterol (2.5 MG/3ML) 0.083% neb solution     30 vial    Take 1 vial (2.5 mg) by nebulization every 6 hours as needed for shortness of breath / dyspnea or wheezing    Asthma flare, Mild persistent asthma without complication       DAILY MULTIVITAMIN PO           diclofenac 75 MG EC tablet    VOLTAREN    90 tablet    Take 1 tablet (75 mg) by mouth 2 times daily as needed for moderate pain    Left foot pain       fluticasone 44 MCG/ACT Inhaler    FLOVENT HFA    3 Inhaler    Inhale 2 puffs into the lungs 2 times daily    Mild intermittent asthma, uncomplicated       glucosamine-chondroitin 500-400 MG Caps per capsule      Take 1 capsule by mouth daily        IBUPROFEN PO      Take 400 mg by mouth 2 times daily as needed for moderate pain        loratadine 10  MG tablet    CLARITIN     Take 10 mg by mouth daily        montelukast 10 MG tablet    SINGULAIR    90 tablet    TAKE 1 TABLET (10 MG) BY MOUTH AT BEDTIME    Moderate asthma       order for DME     1 Device    Equipment being ordered: Nebulizer    Asthma flare, Mild persistent asthma       * Notice:  This list has 2 medication(s) that are the same as other medications prescribed for you. Read the directions carefully, and ask your doctor or other care provider to review them with you.

## 2018-05-10 ENCOUNTER — RADIANT APPOINTMENT (OUTPATIENT)
Dept: ULTRASOUND IMAGING | Facility: CLINIC | Age: 43
End: 2018-05-10
Attending: NURSE PRACTITIONER
Payer: COMMERCIAL

## 2018-05-10 DIAGNOSIS — K42.9 UMBILICAL HERNIA WITHOUT OBSTRUCTION AND WITHOUT GANGRENE: ICD-10-CM

## 2018-05-10 DIAGNOSIS — K43.9 VENTRAL HERNIA WITHOUT OBSTRUCTION OR GANGRENE: ICD-10-CM

## 2018-05-10 ASSESSMENT — ASTHMA QUESTIONNAIRES: ACT_TOTALSCORE: 23

## 2018-05-16 ENCOUNTER — CARE COORDINATION (OUTPATIENT)
Dept: SURGERY | Facility: CLINIC | Age: 43
End: 2018-05-16

## 2018-05-16 ENCOUNTER — OFFICE VISIT (OUTPATIENT)
Dept: SURGERY | Facility: CLINIC | Age: 43
End: 2018-05-16
Payer: COMMERCIAL

## 2018-05-16 ENCOUNTER — TELEPHONE (OUTPATIENT)
Dept: SURGERY | Facility: CLINIC | Age: 43
End: 2018-05-16

## 2018-05-16 VITALS
OXYGEN SATURATION: 96 % | TEMPERATURE: 97.8 F | BODY MASS INDEX: 35.26 KG/M2 | DIASTOLIC BLOOD PRESSURE: 84 MMHG | SYSTOLIC BLOOD PRESSURE: 126 MMHG | HEART RATE: 79 BPM | WEIGHT: 206.5 LBS | HEIGHT: 64 IN

## 2018-05-16 DIAGNOSIS — K42.9 UMBILICAL HERNIA WITHOUT OBSTRUCTION AND WITHOUT GANGRENE: Primary | ICD-10-CM

## 2018-05-16 RX ORDER — CLINDAMYCIN PHOSPHATE 900 MG/50ML
900 INJECTION, SOLUTION INTRAVENOUS SEE ADMIN INSTRUCTIONS
Status: CANCELLED | OUTPATIENT
Start: 2018-05-16

## 2018-05-16 RX ORDER — CLINDAMYCIN PHOSPHATE 900 MG/50ML
900 INJECTION, SOLUTION INTRAVENOUS
Status: CANCELLED | OUTPATIENT
Start: 2018-05-16

## 2018-05-16 ASSESSMENT — PAIN SCALES - GENERAL: PAINLEVEL: NO PAIN (0)

## 2018-05-16 NOTE — PROGRESS NOTES
Pre and Post op Patient Education/Teaching Flowsheet  Relevant Diagnosis:  Umbilical hernia  Teaching Topic:  Pre and post op teaching  Person(s) Involved in teaching:  Patient     Motivation Level:  Asks Questions:  Yes  Eager to Learn:  Yes  Cooperative:  Yes  Receptive (willing/able to accept information):  Yes  Any cultural factors/Yazidism beliefs that may influence understanding or compliance?  No    Patient/caregiver/family demonstrates understanding of the following:  Reason for the appointment, diagnosis, and treatment plan:  Yes  Patient demonstrates understanding of the following:  Pre-op bowel prep:  No  Post-op pain management recommendations (medications, ice compress, binder/athletic supporter (if applicable), etc.:  Yes  Inguinal hernia patients:  Post-op urinary retention- discussed signs/symptoms and visit to ER for Martin catheter placement and to stay in place for at least 48 hours:  NA  Restrictions:  Yes  Medications to take the day of surgery:  Per PCP  Blood thinner medications discussed and when to stop (if applicable):  Yes  Wound care:  Yes  Diabetes medication management (if applicable):  Per PCP  Which situations necessitate calling provider and whom to contact:  Discussed how to contact the hospital, nurse, and clinic scheduling staff if necessary      Date and time of surgery:  Yes  Location of surgery:  [facility] [place]- 5th Floor  History and Physical and any other testing necessary prior to surgery:  Yes  Required time line for completion of History and Physical and any pre-op testing:  Yes  Discuss need for someone to drive patient home and stay with them for 24 hours:  Yes  Pre-op showering/scrub information with Surgical Scrub:  Yes  NPO Guidelines:  NPO per Anesthesia Guidelines    Infection Prevention: Patient demonstrates understanding of the following:  Patient instructed on hand hygiene:  Yes  Surgical procedure site care will be taught and will  be reviewed at the time of discharge  Signs and symptoms of infection taught:  Yes  Wound care reviewed and will be taught at the time of discharge  Central venous catheter care will be taught at the time of discharge (if applicable)    Post-op follow-up:  Instructional materials used/given/mailed:  [facility] Booklet, post op teaching sheet, Map, Soap, and arrival/location information    Surgical instructions mailed to patient

## 2018-05-16 NOTE — LETTER
5/16/2018       RE: Gerry Palafox  [address] [place] [address]     Dear Colleague,    Thank you for referring your patient, Gerry Palafox, to the [facility] GENERAL SURGERY at [facility]. Please see a copy of my visit note below.    Gerry Palafox is a 42 year old male with a 6 month history of an umbilical hernia with the following symptoms of lump and pain.    Finding was not work related.  Onset did occur with lifting.  Obstructive symptoms:  no  Urinary difficulties:  no  Chronic cough: no  Constipation:  no  Current level of activity:  Medium, active with 3 boys ages 1,3,5. Works as .    Past medical history, medications, allergies, family history, and social history were reviewed with the patient.  Patient Active Problem List   Diagnosis     Hyperlipidemia LDL goal <160     TERRIE (obstructive sleep apnea) AHI 21.8     Pain in joint, ankle and foot     Other postprocedural status(V45.89)     Mild persistent asthma     Current Outpatient Prescriptions   Medication     albuterol (2.5 MG/3ML) 0.083% neb solution     albuterol (PROAIR HFA/PROVENTIL HFA/VENTOLIN HFA) 108 (90 BASE) MCG/ACT Inhaler     diclofenac (VOLTAREN) 75 MG EC tablet     fluticasone (FLOVENT HFA) 44 MCG/ACT inhaler     glucosamine-chondroitin 500-400 MG CAPS     IBUPROFEN PO     loratadine (CLARITIN) 10 MG tablet     montelukast (SINGULAIR) 10 MG tablet     Multiple Vitamin (DAILY MULTIVITAMIN PO)     ORDER FOR DME     No current facility-administered medications for this visit.      ROS: 10 point review of systems negative except noted in HPI    PHYSICAL EXAM  General appearance- healthy, alert, and in no distress.  Skin- Skin color and turgor normal.  No obvious rashes.  Neck- Neck is supple with no obvious adenopathy.  Lungs- Respiratory effort unlabored.  Gait- Normal.  Abdomen - overweight,soft non distended, non tender with reducible umbilical hernia.  Impression:  Hernia,  umbilical  Recommendation:  open surgery    A full discussion regarding the alternatives, risks, goals, and potential complications for this surgery was completed today.  The patient understood that the potential problems included but are not limited to:  Infection, bleeding, hematoma, seroma, and recurrence.    The patient verbally expressed understanding, was given the opportunity for questions, and gives full informed consent for the procedure.      Today the patient was instructed on the need for a preoperative H&P, NPO status prior to surgery, and the need to stop anticoagulants prior to surgery.  Additional educational material, soap, and instructions will be mailed out to the patients home.    The total time spent with this patient was 30 minutes.  Of this time, greater than 50% was spent counseling and coordinating care.      Lovenox:  No      Again, thank you for allowing me to participate in the care of your patient.      Sincerely,    Torsten Morelos MD

## 2018-05-16 NOTE — PROGRESS NOTES
Gerry Palafox is a 42 year old male with a 6 month history of an umbilical hernia with the following symptoms of lump and pain.    Finding was not work related.  Onset did occur with lifting.  Obstructive symptoms:  no  Urinary difficulties:  no  Chronic cough: no  Constipation:  no  Current level of activity:  Medium, active with 3 boys ages 1,3,5. Works as .    Past medical history, medications, allergies, family history, and social history were reviewed with the patient.  Patient Active Problem List   Diagnosis     Hyperlipidemia LDL goal <160     TERRIE (obstructive sleep apnea) AHI 21.8     Pain in joint, ankle and foot     Other postprocedural status(V45.89)     Mild persistent asthma     Current Outpatient Prescriptions   Medication     albuterol (2.5 MG/3ML) 0.083% neb solution     albuterol (PROAIR HFA/PROVENTIL HFA/VENTOLIN HFA) 108 (90 BASE) MCG/ACT Inhaler     diclofenac (VOLTAREN) 75 MG EC tablet     fluticasone (FLOVENT HFA) 44 MCG/ACT inhaler     glucosamine-chondroitin 500-400 MG CAPS     IBUPROFEN PO     loratadine (CLARITIN) 10 MG tablet     montelukast (SINGULAIR) 10 MG tablet     Multiple Vitamin (DAILY MULTIVITAMIN PO)     ORDER FOR DME     No current facility-administered medications for this visit.      ROS: 10 point review of systems negative except noted in HPI    PHYSICAL EXAM  General appearance- healthy, alert, and in no distress.  Skin- Skin color and turgor normal.  No obvious rashes.  Neck- Neck is supple with no obvious adenopathy.  Lungs- Respiratory effort unlabored.  Gait- Normal.  Abdomen - overweight,soft non distended, non tender with reducible umbilical hernia.  Impression:  Hernia, umbilical  Recommendation:  open surgery    A full discussion regarding the alternatives, risks, goals, and potential complications for this surgery was completed today.  The patient understood that the potential problems included but are not limited to:  Infection, bleeding, hematoma, seroma,  and recurrence.    The patient verbally expressed understanding, was given the opportunity for questions, and gives full informed consent for the procedure.      Today the patient was instructed on the need for a preoperative H&P, NPO status prior to surgery, and the need to stop anticoagulants prior to surgery.  Additional educational material, soap, and instructions will be mailed out to the patients home.    The total time spent with this patient was 30 minutes.  Of this time, greater than 50% was spent counseling and coordinating care.      Lovenox:  No

## 2018-05-16 NOTE — NURSING NOTE
"Chief Complaint   Patient presents with     Hernia     hernia       Vitals:    05/16/18 0931   BP: 126/84   Pulse: 79   Temp: 97.8  F (36.6  C)   TempSrc: Oral   SpO2: 96%   Weight: 206 lb 8 oz   Height: 5' 4\"       Body mass index is 35.45 kg/(m^2).    Josephine NUNEZ LPN                            "

## 2018-05-16 NOTE — MR AVS SNAPSHOT
After Visit Summary   5/16/2018    Gerry Palafox    MRN: [id number]           Patient Information     Date Of Birth          1975        Visit Information        Provider Department      5/16/2018 9:30 AM Torsten Morelos MD [facility] Surgery        Care Instructions    After Your Open Umbilical Hernia Repair          Incision care     You may take a shower the day after surgery. Carefully wash your incision with soap and water. Do not submerge yourself in water (bath, whirlpool, hot tub, pool, lake) for 14 days after surgery.     Remove the bandage the day after surgery, but leave the medical tape (Steri-Strips) or glue in place. These will loosen and fall off on their own 5 to 7 days after surgery.       Always wash your hands before touching your incisions or removing bandages.      Other medicines     Wait to start aspirin or blood thinners until the day after surgery. You can continue your regular medicines at your normal time the day after surgery.     Your pain medicine may cause constipation (hard, dry stools). To help with this, take the stool softener your doctor gave you or an over-the-counter stool softener or laxative. You can stop taking this when you are no longer taking pain medicine and your bowel movements are back to normal.      For pain or discomfort     Take the narcotic pain medicine your doctor gave you as needed and as instructed on the bottle. If you prefer to use over-the-counter medication, use acetaminophen (Tylenol) or ibuprofen (Advil, Motrin) as instructed on the box. Do not take Tylenol if it is in your narcotic pain medication.     Use an ice pack on your incision (surgical cut) for 20 minutes at a time as needed for the first 24 hours. Be sure to protect your skin by putting a cloth between the ice pack and your skin.     After 24 hours you can switch to heat for 20 minutes as needed. Be sure to protect your skin by putting a cloth between the heat  pack and your skin.         Activities     No driving until you feel it s safe to do so. Don t drive while taking narcotic pain medicine.     Don t lift anything heavier than 20 pounds for 3 to 4 weeks after surgery.      Special equipment     If you left the hospital in KESHAV socks (compression stockings), you may take them off the day after surgery.     If we gave you an abdominal binder, wear it for the first 30 days after surgery. You don t have to wear it when you re sleeping. You can wear it longer than 30 days if you wish.      Diet     You can eat your regular meals after surgery.      When to call the doctor   Call your doctor if you have:     A fever above 101 F (38.3 C) (taken under the tongue), or a fever or chills lasting more than a day.     Redness at the incision site.     Any fluid or blood draining from the incision, especially if it smells bad.      Severe pain that doesn t improve with pain medicine.      We will call you 2 to 4 days after surgery to review this handout, answer questions and help arrange after-surgery care. If you have questions or concerns, please call [phone] during regular office hours. If you need to call after business hours, call [phone] and ask to page the surgeon on-call.             Transversus Abdominis Plane (TAP) Pain Block      What is a TAP block?   A TAP block can help you manage your pain after surgery. TAP stands for transversus abdominis plane, which is a muscle layer in your abdomen (belly). The TAP block uses numbing medicine similar to Novocaine to block pain near the site of your surgery.       Why get a TAP block?     To better manage your pain after surgery. A tap block will help keep the pain from getting severe and out of control.     To block pain signals from the nerve, which helps decrease pain after surgery.     To help you sleep, easily breathe deeply, walk and visit with others.      How is it done?   You will lie still on a table. We will  use an ultrasound machine to help us see the correct muscle layer of your abdomen. Then, we ll use a needle to inject the medicine. We may also give you some sleep medicine to lessen the pain of the injection.       The procedure takes between 5 and 15 minutes. It is usually done right before surgery, but will sometimes be after. It depends on your surgery and care needs.      What can I expect?     You may feel numbness, tingling or a heaviness in your abdomen.      You may have pain control up to 72 hours after surgery.     The TAP block may not lessen all of your surgery pain. But most patients feel 50 to 75 percent less pain than without the block.       Tell your nurse if you have:     Numbness or tingling in areas other than where the injection was     Blurry vision     Ringing in your ears     A metallic taste in your mouth             Follow-ups after your visit        Who to contact     Please call your clinic at [phone] to:    Ask questions about your health    Make or cancel appointments    Discuss your medicines    Learn about your test results    Speak to your doctor            Additional Information About Your Visit        [employer] Information     [employer] gives you secure access to your electronic health record. If you see a primary care provider, you can also send messages to your care team and make appointments. If you have questions, please call your primary care clinic.  If you do not have a primary care provider, please call [phone] and they will assist you.      [employer] is an electronic gateway that provides easy, online access to your medical records. With [employer], you can request a clinic appointment, read your test results, renew a prescription or communicate with your care team.     To access your existing account, please contact your [facility] or call [phone] for assistance.        Care EveryWhere ID     This is your Care EveryWhere ID. This  "could be used by other organizations to access your [place] medical records  [id number]        Your Vitals Were     Pulse Temperature Height Pulse Oximetry BMI (Body Mass Index)       79 97.8  F (36.6  C) (Oral) 1.626 m (5' 4\") 96% 35.45 kg/m2        Blood Pressure from Last 3 Encounters:   05/16/18 126/84   05/09/18 132/77   01/29/18 127/89    Weight from Last 3 Encounters:   05/16/18 93.7 kg (206 lb 8 oz)   05/09/18 92.5 kg (204 lb)   01/29/18 99.8 kg (220 lb)              Today, you had the following     No orders found for display       Primary Care Provider Office Phone # Fax #    Pooja CHARO [facility] [phone]       [address]  [facility] [address]        Equal Access to Services     LINDSEY FERNANDEZ : Hadii aad ku hadasho Soomaali, waaxda luqadaha, qaybta kaalmada adeegyada, waxay idiin haybrittneen kelley griffin . So [facility] [phone].    ATENCIÓN: Si habla español, tiene a gonzalez disposición servicios gratuitos de asistencia lingüística. Saurav al [phone].    We comply with applicable federal civil rights laws and [place] laws. We do not discriminate on the basis of race, color, national origin, age, disability, sex, sexual orientation, or gender identity.            Thank you!     Thank you for choosing [facility]  for your care. Our goal is always to provide you with excellent care. Hearing back from our patients is one way we can continue to improve our services. Please take a few minutes to complete the written survey that you may receive in the mail after your visit with us. Thank you!             Your Updated Medication List - Protect others around you: Learn how to safely use, store and throw away your medicines at www.disposemymeds.org.          This list is accurate as of 5/16/18  9:52 AM.  Always use your most recent med list.                   Brand Name Dispense Instructions for use Diagnosis    * albuterol 108 (90 Base) MCG/ACT Inhaler    " PROAIR HFA/PROVENTIL HFA/VENTOLIN HFA    3 Inhaler    Inhale 2 puffs into the lungs every 6 hours as needed for shortness of breath / dyspnea or wheezing    Mild intermittent asthma, uncomplicated       * albuterol (2.5 MG/3ML) 0.083% neb solution     30 vial    Take 1 vial (2.5 mg) by nebulization every 6 hours as needed for shortness of breath / dyspnea or wheezing    Asthma flare, Mild persistent asthma without complication       DAILY MULTIVITAMIN PO           diclofenac 75 MG EC tablet    VOLTAREN    90 tablet    Take 1 tablet (75 mg) by mouth 2 times daily as needed for moderate pain    Left foot pain       fluticasone 44 MCG/ACT Inhaler    FLOVENT HFA    3 Inhaler    Inhale 2 puffs into the lungs 2 times daily    Mild intermittent asthma, uncomplicated       glucosamine-chondroitin 500-400 MG Caps per capsule      Take 1 capsule by mouth daily        IBUPROFEN PO      Take 400 mg by mouth 2 times daily as needed for moderate pain        loratadine 10 MG tablet    CLARITIN     Take 10 mg by mouth daily        montelukast 10 MG tablet    SINGULAIR    90 tablet    TAKE 1 TABLET (10 MG) BY MOUTH AT BEDTIME    Moderate asthma       order for DME     1 Device    Equipment being ordered: Nebulizer    Asthma flare, Mild persistent asthma       * Notice:  This list has 2 medication(s) that are the same as other medications prescribed for you. Read the directions carefully, and ask your doctor or other care provider to review them with you.

## 2018-05-16 NOTE — PATIENT INSTRUCTIONS
After Your Open Umbilical Hernia Repair          Incision care     You may take a shower the day after surgery. Carefully wash your incision with soap and water. Do not submerge yourself in water (bath, whirlpool, hot tub, pool, lake) for 14 days after surgery.     Remove the bandage the day after surgery, but leave the medical tape (Steri-Strips) or glue in place. These will loosen and fall off on their own 5 to 7 days after surgery.       Always wash your hands before touching your incisions or removing bandages.      Other medicines     Wait to start aspirin or blood thinners until the day after surgery. You can continue your regular medicines at your normal time the day after surgery.     Your pain medicine may cause constipation (hard, dry stools). To help with this, take the stool softener your doctor gave you or an over-the-counter stool softener or laxative. You can stop taking this when you are no longer taking pain medicine and your bowel movements are back to normal.      For pain or discomfort     Take the narcotic pain medicine your doctor gave you as needed and as instructed on the bottle. If you prefer to use over-the-counter medication, use acetaminophen (Tylenol) or ibuprofen (Advil, Motrin) as instructed on the box. Do not take Tylenol if it is in your narcotic pain medication.     Use an ice pack on your incision (surgical cut) for 20 minutes at a time as needed for the first 24 hours. Be sure to protect your skin by putting a cloth between the ice pack and your skin.     After 24 hours you can switch to heat for 20 minutes as needed. Be sure to protect your skin by putting a cloth between the heat pack and your skin.         Activities     No driving until you feel it s safe to do so. Don t drive while taking narcotic pain medicine.     Don t lift anything heavier than 20 pounds for 3 to 4 weeks after surgery.      Special equipment     If you left the hospital in KESHAV socks (compression  stockings), you may take them off the day after surgery.     If we gave you an abdominal binder, wear it for the first 30 days after surgery. You don t have to wear it when you re sleeping. You can wear it longer than 30 days if you wish.      Diet     You can eat your regular meals after surgery.      When to call the doctor   Call your doctor if you have:     A fever above 101 F (38.3 C) (taken under the tongue), or a fever or chills lasting more than a day.     Redness at the incision site.     Any fluid or blood draining from the incision, especially if it smells bad.      Severe pain that doesn t improve with pain medicine.      We will call you 2 to 4 days after surgery to review this handout, answer questions and help arrange after-surgery care. If you have questions or concerns, please call [phone] during regular office hours. If you need to call after business hours, call [phone] and ask to page the surgeon on-call.             Transversus Abdominis Plane (TAP) Pain Block      What is a TAP block?   A TAP block can help you manage your pain after surgery. TAP stands for transversus abdominis plane, which is a muscle layer in your abdomen (belly). The TAP block uses numbing medicine similar to Novocaine to block pain near the site of your surgery.       Why get a TAP block?     To better manage your pain after surgery. A tap block will help keep the pain from getting severe and out of control.     To block pain signals from the nerve, which helps decrease pain after surgery.     To help you sleep, easily breathe deeply, walk and visit with others.      How is it done?   You will lie still on a table. We will use an ultrasound machine to help us see the correct muscle layer of your abdomen. Then, we ll use a needle to inject the medicine. We may also give you some sleep medicine to lessen the pain of the injection.       The procedure takes between 5 and 15 minutes. It is usually done right before  surgery, but will sometimes be after. It depends on your surgery and care needs.      What can I expect?     You may feel numbness, tingling or a heaviness in your abdomen.      You may have pain control up to 72 hours after surgery.     The TAP block may not lessen all of your surgery pain. But most patients feel 50 to 75 percent less pain than without the block.       Tell your nurse if you have:     Numbness or tingling in areas other than where the injection was     Blurry vision     Ringing in your ears     A metallic taste in your mouth

## 2018-05-16 NOTE — TELEPHONE ENCOUNTER
Patient spoke with his wife and would like to schedule his surgery on 05/22/2018. This  let him know that Dr. Morelos would need to be consulted about this request. This  let the patient know that he could expect a call on 05/17/2018 about this request. Patient agreed to this plan.

## 2018-05-17 ENCOUNTER — TELEPHONE (OUTPATIENT)
Dept: SURGERY | Facility: CLINIC | Age: 43
End: 2018-05-17

## 2018-05-17 NOTE — TELEPHONE ENCOUNTER
Patient called to schedule his surgery. Spoke with Gerry Vivienne in clinic about surgery dates and he selected 05/24/2018. Confirmed time, date, arrival time and location for surgery on 05/24/2018  at 3:45 PM with Dr. Torsten Morelos. Reviewed with him that he would need someone with him after surgery to drive him home and for 24 hours after his surgery. He disclosed that it would be his spouse. He confirmed that he would schedule a pre-op physical with  Pooja Rodriguez NP. Patient is aware that he can call to schedule a pre-op with the [facility] ([facility]) if he is unable to schedule with his primary doctor..He also knows to call general surgery if he experiences any cold or flu symptoms. Surgery teaching and soap were given to Gerry Palafox in clinic on 05/16/2018. He was asked to call [phone] if he needs to reschedule and [phone] if he experiences any symptoms. Surgery packet was sent to patient via [employer].

## 2018-05-21 ENCOUNTER — OFFICE VISIT (OUTPATIENT)
Dept: FAMILY MEDICINE | Facility: CLINIC | Age: 43
End: 2018-05-21
Payer: COMMERCIAL

## 2018-05-21 VITALS
HEART RATE: 76 BPM | BODY MASS INDEX: 36.8 KG/M2 | OXYGEN SATURATION: 98 % | SYSTOLIC BLOOD PRESSURE: 132 MMHG | DIASTOLIC BLOOD PRESSURE: 82 MMHG | WEIGHT: 214.4 LBS | TEMPERATURE: 98.2 F

## 2018-05-21 DIAGNOSIS — K42.9 UMBILICAL HERNIA WITHOUT OBSTRUCTION AND WITHOUT GANGRENE: ICD-10-CM

## 2018-05-21 DIAGNOSIS — G47.33 OSA (OBSTRUCTIVE SLEEP APNEA): ICD-10-CM

## 2018-05-21 DIAGNOSIS — J45.30 MILD PERSISTENT ASTHMA WITHOUT COMPLICATION: ICD-10-CM

## 2018-05-21 DIAGNOSIS — Z01.818 PREOP GENERAL PHYSICAL EXAM: Primary | ICD-10-CM

## 2018-05-21 DIAGNOSIS — J30.2 CHRONIC SEASONAL ALLERGIC RHINITIS, UNSPECIFIED TRIGGER: ICD-10-CM

## 2018-05-21 PROCEDURE — 99214 OFFICE O/P EST MOD 30 MIN: CPT | Performed by: NURSE PRACTITIONER

## 2018-05-21 NOTE — MR AVS SNAPSHOT
After Visit Summary   5/21/2018    Gerry Palafox    MRN: [id number]           Patient Information     Date Of Birth          1975        Visit Information        Provider Department      5/21/2018 9:40 AM Anali Cedeño APRN [facility]        Today's Diagnoses     Preop general physical exam    -  1    Umbilical hernia without obstruction and without gangrene        TERRIE (obstructive sleep apnea) AHI 21.8        Mild persistent asthma without complication        Chronic seasonal allergic rhinitis, unspecified trigger          Care Instructions      Before Your Surgery      Call your surgeon if there is any change in your health. This includes signs of a cold or flu (such as a sore throat, runny nose, cough, rash or fever).    Do not smoke, drink alcohol or take over the counter medicine (unless your surgeon or primary care doctor tells you to) for the 24 hours before and after surgery.    If you take prescribed drugs: Follow your doctor s orders about which medicines to take and which to stop until after surgery.    Eating and drinking prior to surgery: follow the instructions from your surgeon    Take a shower or bath the night before surgery. Use the soap your surgeon gave you to gently clean your skin. If you do not have soap from your surgeon, use your regular soap. Do not shave or scrub the surgery site.  Wear clean pajamas and have clean sheets on your bed.           Follow-ups after your visit        Your next 10 appointments already scheduled     May 24, 2018   Procedure with Torsten Morelos MD   [facility] ([facility])    [address] [place], [place] [id number] [phone]           Located in the Clinics and Surgery Center at [address] [place] [address].   parking is very convenient and highly recommended.  is a $6 flat rate fee.  Both  and self parkers should  enter the main arrival plaza from [facility]; parking attendants will direct you based on your parking preference.              Who to contact     If you have questions or need follow up information about today's clinic visit or your schedule please contact [facility] directly at [phone].  Normal or non-critical lab and imaging results will be communicated to you by MyChart, letter or phone within 4 business days after the clinic has received the results. If you do not hear from us within 7 days, please contact the clinic through [employer] or phone. If you have a critical or abnormal lab result, we will notify you by phone as soon as possible.  Submit refill requests through [employer] or call your pharmacy and they will forward the refill request to us. Please allow 3 business days for your refill to be completed.          Additional Information About Your Visit        MyChart Information     [employer] gives you secure access to your electronic health record. If you see a primary care provider, you can also send messages to your care team and make appointments. If you have questions, please call your primary care clinic.  If you do not have a primary care provider, please call [phone] and they will assist you.        Care EveryWhere ID     This is your Care EveryWhere ID. This could be used by other organizations to access your [place] medical records  [id number]        Your Vitals Were     Pulse Temperature Pulse Oximetry BMI (Body Mass Index)          76 98.2  F (36.8  C) (Oral) 98% 36.8 kg/m2         Blood Pressure from Last 3 Encounters:   05/21/18 132/82   05/16/18 126/84   05/09/18 132/77    Weight from Last 3 Encounters:   05/21/18 214 lb 6.4 oz (97.3 kg)   05/16/18 206 lb 8 oz (93.7 kg)   05/09/18 204 lb (92.5 kg)              Today, you had the following     No orders found for display       Primary Care Provider Office Phone # Fax #    CHARO Shepard [phone]  [phone]       [address] [facility] [address]  [facility] [address]        Equal Access to Services     SHEY FERNANDEZ : Hadii aad ku hadtammio Somikeali, waaxda luqadaha, qaybta kaalmada tatiana, guillaume eunin hayaacarlos londonolana barbour gaby parham. So [facility] [phone].    ATENCIÓN: Si habla español, tiene a gonzalez disposición servicios gratuitos de asistencia lingüística. Llame al [phone].    We comply with applicable federal civil rights laws and [place] laws. We do not discriminate on the basis of race, color, national origin, age, disability, sex, sexual orientation, or gender identity.            Thank you!     Thank you for choosing [facility]  for your care. Our goal is always to provide you with excellent care. Hearing back from our patients is one way we can continue to improve our services. Please take a few minutes to complete the written survey that you may receive in the mail after your visit with us. Thank you!             Your Updated Medication List - Protect others around you: Learn how to safely use, store and throw away your medicines at www.disposemymeds.org.          This list is accurate as of 5/21/18  9:49 AM.  Always use your most recent med list.                   Brand Name Dispense Instructions for use Diagnosis    * albuterol 108 (90 Base) MCG/ACT Inhaler    PROAIR HFA/PROVENTIL HFA/VENTOLIN HFA    3 Inhaler    Inhale 2 puffs into the lungs every 6 hours as needed for shortness of breath / dyspnea or wheezing    Mild intermittent asthma, uncomplicated       * albuterol (2.5 MG/3ML) 0.083% neb solution     30 vial    Take 1 vial (2.5 mg) by nebulization every 6 hours as needed for shortness of breath / dyspnea or wheezing    Asthma flare, Mild persistent asthma without complication       IBUPROFEN PO      Take 400 mg by mouth 2 times daily as needed for moderate pain        loratadine 10 MG tablet    CLARITIN     Take 10 mg by mouth daily        montelukast 10 MG tablet    SINGULAIR     90 tablet    TAKE 1 TABLET (10 MG) BY MOUTH AT BEDTIME    Moderate asthma       order for DME     1 Device    Equipment being ordered: Nebulizer    Asthma flare, Mild persistent asthma       * Notice:  This list has 2 medication(s) that are the same as other medications prescribed for you. Read the directions carefully, and ask your doctor or other care provider to review them with you.

## 2018-05-21 NOTE — PROGRESS NOTES
[address] [address]  [phone]  Dept: [phone]    PRE-OP EVALUATION:  Today's date: 2018    Gerry Palafox (: 1975) presents for pre-operative evaluation assessment as requested by Dr. Torsten Morelos.  He requires evaluation and anesthesia risk assessment prior to undergoing surgery/procedure for treatment of Open Mesh Repair Umbilical Hernia .    Proposed Surgery/ Procedure: Open Mesh Repair Umbilical Hernia  Date of Surgery/ Procedure: 2018  Time of Surgery/ Procedure: 3:45 p.m  Hospital/Surgical Facility: [facility]  Fax number for surgical facility: should be on file   Primary Physician: Pooja Rodriguez  Type of Anesthesia Anticipated: Local with MAC    Patient has a Health Care Directive or Living Will:  YES might be on file but do have it at home.    1. NO - Do you have a history of heart attack, stroke, stent, bypass or surgery on an artery in the head, neck, heart or legs?  2. NO - Do you ever have any pain or discomfort in your chest?  3. NO - Do you have a history of  Heart Failure?  4. NO - Are you troubled by shortness of breath when: walking on the level, up a slight hill or at night?  5. NO - Do you currently have a cold, bronchitis or other respiratory infection?  6. YES - DO YOU HAVE A COUGH, SHORTNESS OF BREATH OR WHEEZING? Seasonal allergy   7. NO - Do you sometimes get pains in the calves of your legs when you walk?  8. NO - Do you or anyone in your family have previous history of blood clots?  9. NO - Do you or does anyone in your family have a serious bleeding problem such as prolonged bleeding following surgeries or cuts?  10. NO - Have you ever had problems with anemia or been told to take iron pills?  11. NO - Have you had any abnormal blood loss such as black, tarry or bloody stools, or abnormal vaginal bleeding?  12. NO - Have you ever had a blood transfusion?  13. NO - Have you or any of your  relatives ever had problems with anesthesia?  14. YES - DO YOU HAVE SLEEP APNEA, EXCESSIVE SNORING OR DAYTIME DROWSINESS? Sleep machine, CPAP  15. NO - Do you have any prosthetic heart valves?  16. NO - Do you have prosthetic joints?  17. NO - Is there any chance that you may be pregnant?      HPI:     HPI related to upcoming procedure: umbilical hernia      ASTHMA - Patient has a longstanding history of moderate-severe Asthma . Patient has been doing well overall noting COUGH and continues on medication regimen consisting of singulair and claritin for seasonal allergies, albuterol has been taking maybe once every other day during allergy season without adverse reactions or side effects.                                                                                                                                               .    MEDICAL HISTORY:     Patient Active Problem List    Diagnosis Date Noted     Mild persistent asthma      Priority: Medium     Pain in joint, ankle and foot 09/18/2014     Priority: Medium     Other postprocedural status(V45.89) 09/18/2014     Priority: Medium     TERRIE (obstructive sleep apnea) AHI 21.8 07/30/2014     Priority: Medium     HST 7/30/2014 50 min below 88%       Hyperlipidemia LDL goal <160 07/24/2014     Priority: Medium      Past Medical History:   Diagnosis Date     Mild persistent asthma      No past surgical history on file.  Current Outpatient Prescriptions   Medication Sig Dispense Refill     albuterol (2.5 MG/3ML) 0.083% neb solution Take 1 vial (2.5 mg) by nebulization every 6 hours as needed for shortness of breath / dyspnea or wheezing 30 vial 0     albuterol (PROAIR HFA/PROVENTIL HFA/VENTOLIN HFA) 108 (90 BASE) MCG/ACT Inhaler Inhale 2 puffs into the lungs every 6 hours as needed for shortness of breath / dyspnea or wheezing 3 Inhaler 3     diclofenac (VOLTAREN) 75 MG EC tablet Take 1 tablet (75 mg) by mouth 2 times daily as needed for moderate pain 90 tablet 1      fluticasone (FLOVENT HFA) 44 MCG/ACT inhaler Inhale 2 puffs into the lungs 2 times daily 3 Inhaler 3     glucosamine-chondroitin 500-400 MG CAPS Take 1 capsule by mouth daily       IBUPROFEN PO Take 400 mg by mouth 2 times daily as needed for moderate pain       loratadine (CLARITIN) 10 MG tablet Take 10 mg by mouth daily       montelukast (SINGULAIR) 10 MG tablet TAKE 1 TABLET (10 MG) BY MOUTH AT BEDTIME 90 tablet 1     Multiple Vitamin (DAILY MULTIVITAMIN PO)        ORDER FOR DME Equipment being ordered: Nebulizer 1 Device 0     OTC products: no recent use of OTC ASA, NSAIDS or Steroids    Allergies   Allergen Reactions     Penicillin G      hives      Latex Allergy: NO    Social History   Substance Use Topics     Smoking status: Never Smoker     Smokeless tobacco: Never Used     Alcohol use Yes      Comment: varies     History   Drug Use No       REVIEW OF SYSTEMS:   Constitutional, neuro, ENT, endocrine, pulmonary, cardiac, gastrointestinal, genitourinary, musculoskeletal, integument and psychiatric systems are negative, except as otherwise noted.    EXAM:   /82  Pulse 76  Temp 98.2  F (36.8  C) (Oral)  Wt 214 lb 6.4 oz (97.3 kg)  SpO2 98%  BMI 36.8 kg/m2    GENERAL APPEARANCE: healthy, alert and no distress     EYES: EOMI,  PERRL     HENT: ear canals and TM's normal and nose and mouth without ulcers or lesions     NECK: no adenopathy, no asymmetry, masses, or scars and thyroid normal to palpation     RESP: lungs clear to auscultation - no rales, rhonchi or wheezes     CV: regular rates and rhythm, normal S1 S2, no S3 or S4 and no murmur, click or rub     ABDOMEN:  soft, nontender, no HSM or masses and bowel sounds normal     MS: extremities normal- no gross deformities noted, no evidence of inflammation in joints, FROM in all extremities.     SKIN: no suspicious lesions or rashes     NEURO: Normal strength and tone, sensory exam grossly normal, mentation intact and speech normal     PSYCH: mentation  appears normal. and affect normal/bright     LYMPHATICS: No cervical adenopathy    DIAGNOSTICS:   No labs or EKG required for low risk surgery (cataract, skin procedure, breast biopsy, etc)    Recent Labs   Lab Test  06/14/17   1000   NA  138   POTASSIUM  3.8   CR  0.71        IMPRESSION:   Reason for surgery/procedure: umbilical hernia/ hernia repair   Diagnosis/reason for consult: pre-op exam     The proposed surgical procedure is considered INTERMEDIATE risk.    REVISED CARDIAC RISK INDEX  The patient has the following serious cardiovascular risks for perioperative complications such as (MI, PE, VFib and 3  AV Block):  No serious cardiac risks  INTERPRETATION: 0 risks: Class I (very low risk - 0.4% complication rate)    The patient has the following additional risks for perioperative complications:  No identified additional risks      ICD-10-CM    1. Preop general physical exam Z01.818    2. Umbilical hernia without obstruction and without gangrene K42.9    3. TERRIE (obstructive sleep apnea) AHI 21.8 G47.33    4. Mild persistent asthma without complication J45.30    5. Chronic seasonal allergic rhinitis, unspecified trigger J30.2        RECOMMENDATIONS:     Bring home CPAP     --Patient is to take all scheduled medications on the day of surgery EXCEPT for modifications listed below. No NSAIDs within 1-2 days of surgery     APPROVAL GIVEN to proceed with proposed procedure, without further diagnostic evaluation       Signed Electronically by: CHARO Valencia CNP    Copy of this evaluation report is provided to requesting physician.    [facility] Preop Guidelines    Revised Cardiac Risk Index

## 2018-05-22 ENCOUNTER — TELEPHONE (OUTPATIENT)
Dept: SURGERY | Facility: CLINIC | Age: 43
End: 2018-05-22

## 2018-05-22 NOTE — TELEPHONE ENCOUNTER
"[facility] Call Center    Phone Message    May a detailed message be left on voicemail: yes    Reason for Call: Other: Pt is having surgery with Dr Morelos on 5/24 and recieved some paper in the mail stating he should \"pre register\" Pt requesting a call back from Dr Morelos team to discuss what that means and what he needs to do. Please call back to discuss. Thanks     Action Taken: Message routed to:  [facility] ([facility] Surgery      "

## 2018-05-22 NOTE — TELEPHONE ENCOUNTER
Attempted to reach patient with no answer.  LM on VM.  Both Admitting and Pre-Admission Nursing should reach out to patient at least 24 hours prior to surgery to confirm demographics, insurance, and surgical instructions.

## 2018-05-22 NOTE — TELEPHONE ENCOUNTER
Spoke with patient and answered questions.      Patient states that his insurance company requires prior authorization for surgery.  Called [facility] and spoke with Yosef.  No prior authorization is required for open umbilical hernia repair (65718) in the outpatient ambulatory surgery center.

## 2018-05-23 ENCOUNTER — ANESTHESIA EVENT (OUTPATIENT)
Dept: SURGERY | Facility: AMBULATORY SURGERY CENTER | Age: 43
End: 2018-05-23

## 2018-05-24 ENCOUNTER — HOSPITAL ENCOUNTER (OUTPATIENT)
Facility: AMBULATORY SURGERY CENTER | Age: 43
End: 2018-05-24
Attending: SURGERY
Payer: COMMERCIAL

## 2018-05-24 ENCOUNTER — SURGERY (OUTPATIENT)
Age: 43
End: 2018-05-24

## 2018-05-24 ENCOUNTER — ANESTHESIA (OUTPATIENT)
Dept: SURGERY | Facility: AMBULATORY SURGERY CENTER | Age: 43
End: 2018-05-24

## 2018-05-24 VITALS
TEMPERATURE: 98.2 F | BODY MASS INDEX: 36.54 KG/M2 | RESPIRATION RATE: 16 BRPM | DIASTOLIC BLOOD PRESSURE: 72 MMHG | SYSTOLIC BLOOD PRESSURE: 122 MMHG | HEIGHT: 64 IN | WEIGHT: 214 LBS | OXYGEN SATURATION: 98 %

## 2018-05-24 DIAGNOSIS — K42.9 UMBILICAL HERNIA WITHOUT OBSTRUCTION AND WITHOUT GANGRENE: Primary | ICD-10-CM

## 2018-05-24 DEVICE — MESH POLYESTER PROGRIP 6X6" (15X15CM) PARIETEX TEM1515G: Type: IMPLANTABLE DEVICE | Site: ABDOMEN | Status: FUNCTIONAL

## 2018-05-24 RX ORDER — KETOROLAC TROMETHAMINE 30 MG/ML
INJECTION, SOLUTION INTRAMUSCULAR; INTRAVENOUS PRN
Status: DISCONTINUED | OUTPATIENT
Start: 2018-05-24 | End: 2018-05-24

## 2018-05-24 RX ORDER — GABAPENTIN 300 MG/1
300 CAPSULE ORAL ONCE
Status: COMPLETED | OUTPATIENT
Start: 2018-05-24 | End: 2018-05-24

## 2018-05-24 RX ORDER — FENTANYL CITRATE 50 UG/ML
INJECTION, SOLUTION INTRAMUSCULAR; INTRAVENOUS PRN
Status: DISCONTINUED | OUTPATIENT
Start: 2018-05-24 | End: 2018-05-24

## 2018-05-24 RX ORDER — SODIUM CHLORIDE, SODIUM LACTATE, POTASSIUM CHLORIDE, CALCIUM CHLORIDE 600; 310; 30; 20 MG/100ML; MG/100ML; MG/100ML; MG/100ML
INJECTION, SOLUTION INTRAVENOUS CONTINUOUS
Status: DISCONTINUED | OUTPATIENT
Start: 2018-05-24 | End: 2018-05-24 | Stop reason: HOSPADM

## 2018-05-24 RX ORDER — PROPOFOL 10 MG/ML
INJECTION, EMULSION INTRAVENOUS PRN
Status: DISCONTINUED | OUTPATIENT
Start: 2018-05-24 | End: 2018-05-24

## 2018-05-24 RX ORDER — SODIUM CHLORIDE, SODIUM LACTATE, POTASSIUM CHLORIDE, CALCIUM CHLORIDE 600; 310; 30; 20 MG/100ML; MG/100ML; MG/100ML; MG/100ML
INJECTION, SOLUTION INTRAVENOUS CONTINUOUS
Status: DISCONTINUED | OUTPATIENT
Start: 2018-05-24 | End: 2018-05-25 | Stop reason: HOSPADM

## 2018-05-24 RX ORDER — MAGNESIUM HYDROXIDE 1200 MG/15ML
LIQUID ORAL PRN
Status: DISCONTINUED | OUTPATIENT
Start: 2018-05-24 | End: 2018-05-24 | Stop reason: HOSPADM

## 2018-05-24 RX ORDER — BUPIVACAINE HYDROCHLORIDE 5 MG/ML
INJECTION, SOLUTION PERINEURAL PRN
Status: DISCONTINUED | OUTPATIENT
Start: 2018-05-24 | End: 2018-05-24 | Stop reason: HOSPADM

## 2018-05-24 RX ORDER — HYDROCODONE BITARTRATE AND ACETAMINOPHEN 5; 325 MG/1; MG/1
1-2 TABLET ORAL EVERY 4 HOURS PRN
Qty: 20 TABLET | Refills: 0 | Status: SHIPPED | OUTPATIENT
Start: 2018-05-24 | End: 2018-11-19

## 2018-05-24 RX ORDER — NALOXONE HYDROCHLORIDE 0.4 MG/ML
.1-.4 INJECTION, SOLUTION INTRAMUSCULAR; INTRAVENOUS; SUBCUTANEOUS
Status: DISCONTINUED | OUTPATIENT
Start: 2018-05-24 | End: 2018-05-25 | Stop reason: HOSPADM

## 2018-05-24 RX ORDER — HYDROCODONE BITARTRATE AND ACETAMINOPHEN 5; 325 MG/1; MG/1
1 TABLET ORAL
Status: DISCONTINUED | OUTPATIENT
Start: 2018-05-24 | End: 2018-05-25 | Stop reason: HOSPADM

## 2018-05-24 RX ORDER — ONDANSETRON 2 MG/ML
4 INJECTION INTRAMUSCULAR; INTRAVENOUS EVERY 30 MIN PRN
Status: DISCONTINUED | OUTPATIENT
Start: 2018-05-24 | End: 2018-05-25 | Stop reason: HOSPADM

## 2018-05-24 RX ORDER — AMOXICILLIN 250 MG
1-2 CAPSULE ORAL 2 TIMES DAILY
Qty: 30 TABLET | Refills: 0 | Status: SHIPPED | OUTPATIENT
Start: 2018-05-24 | End: 2018-11-19

## 2018-05-24 RX ORDER — CLINDAMYCIN PHOSPHATE 900 MG/50ML
900 INJECTION, SOLUTION INTRAVENOUS SEE ADMIN INSTRUCTIONS
Status: DISCONTINUED | OUTPATIENT
Start: 2018-05-24 | End: 2018-05-24 | Stop reason: HOSPADM

## 2018-05-24 RX ORDER — ACETAMINOPHEN 325 MG/1
975 TABLET ORAL ONCE
Status: COMPLETED | OUTPATIENT
Start: 2018-05-24 | End: 2018-05-24

## 2018-05-24 RX ORDER — ONDANSETRON 4 MG/1
4 TABLET, ORALLY DISINTEGRATING ORAL EVERY 30 MIN PRN
Status: DISCONTINUED | OUTPATIENT
Start: 2018-05-24 | End: 2018-05-25 | Stop reason: HOSPADM

## 2018-05-24 RX ORDER — CLINDAMYCIN PHOSPHATE 900 MG/50ML
900 INJECTION, SOLUTION INTRAVENOUS
Status: DISCONTINUED | OUTPATIENT
Start: 2018-05-24 | End: 2018-05-24 | Stop reason: HOSPADM

## 2018-05-24 RX ORDER — LIDOCAINE HYDROCHLORIDE 20 MG/ML
INJECTION, SOLUTION INFILTRATION; PERINEURAL PRN
Status: DISCONTINUED | OUTPATIENT
Start: 2018-05-24 | End: 2018-05-24

## 2018-05-24 RX ORDER — OXYCODONE HYDROCHLORIDE 5 MG/1
5 TABLET ORAL EVERY 4 HOURS PRN
Status: DISCONTINUED | OUTPATIENT
Start: 2018-05-24 | End: 2018-05-25 | Stop reason: HOSPADM

## 2018-05-24 RX ORDER — FENTANYL CITRATE 50 UG/ML
25-50 INJECTION, SOLUTION INTRAMUSCULAR; INTRAVENOUS
Status: DISCONTINUED | OUTPATIENT
Start: 2018-05-24 | End: 2018-05-24 | Stop reason: HOSPADM

## 2018-05-24 RX ORDER — LIDOCAINE 40 MG/G
CREAM TOPICAL
Status: DISCONTINUED | OUTPATIENT
Start: 2018-05-24 | End: 2018-05-24 | Stop reason: HOSPADM

## 2018-05-24 RX ADMIN — GABAPENTIN 300 MG: 300 CAPSULE ORAL at 08:19

## 2018-05-24 RX ADMIN — OXYCODONE HYDROCHLORIDE 5 MG: 5 TABLET ORAL at 10:55

## 2018-05-24 RX ADMIN — PROPOFOL 20 MG: 10 INJECTION, EMULSION INTRAVENOUS at 10:15

## 2018-05-24 RX ADMIN — PROPOFOL 20 MG: 10 INJECTION, EMULSION INTRAVENOUS at 09:57

## 2018-05-24 RX ADMIN — PROPOFOL 20 MG: 10 INJECTION, EMULSION INTRAVENOUS at 09:59

## 2018-05-24 RX ADMIN — FENTANYL CITRATE 50 MCG: 50 INJECTION, SOLUTION INTRAMUSCULAR; INTRAVENOUS at 09:52

## 2018-05-24 RX ADMIN — MAGNESIUM HYDROXIDE 1000 ML: 1200 LIQUID ORAL at 10:09

## 2018-05-24 RX ADMIN — PROPOFOL 20 MG: 10 INJECTION, EMULSION INTRAVENOUS at 10:12

## 2018-05-24 RX ADMIN — CLINDAMYCIN PHOSPHATE 900 MG: 900 INJECTION, SOLUTION INTRAVENOUS at 09:49

## 2018-05-24 RX ADMIN — KETOROLAC TROMETHAMINE 30 MG: 30 INJECTION, SOLUTION INTRAMUSCULAR; INTRAVENOUS at 10:28

## 2018-05-24 RX ADMIN — PROPOFOL 20 MG: 10 INJECTION, EMULSION INTRAVENOUS at 10:04

## 2018-05-24 RX ADMIN — BUPIVACAINE HYDROCHLORIDE 20 ML: 5 INJECTION, SOLUTION PERINEURAL at 10:32

## 2018-05-24 RX ADMIN — LIDOCAINE HYDROCHLORIDE 60 MG: 20 INJECTION, SOLUTION INFILTRATION; PERINEURAL at 09:52

## 2018-05-24 RX ADMIN — PROPOFOL 20 MG: 10 INJECTION, EMULSION INTRAVENOUS at 10:07

## 2018-05-24 RX ADMIN — SODIUM CHLORIDE, SODIUM LACTATE, POTASSIUM CHLORIDE, CALCIUM CHLORIDE: 600; 310; 30; 20 INJECTION, SOLUTION INTRAVENOUS at 08:18

## 2018-05-24 RX ADMIN — PROPOFOL 20 MG: 10 INJECTION, EMULSION INTRAVENOUS at 10:01

## 2018-05-24 RX ADMIN — PROPOFOL 20 MG: 10 INJECTION, EMULSION INTRAVENOUS at 10:02

## 2018-05-24 RX ADMIN — PROPOFOL 20 MG: 10 INJECTION, EMULSION INTRAVENOUS at 10:21

## 2018-05-24 RX ADMIN — PROPOFOL 20 MG: 10 INJECTION, EMULSION INTRAVENOUS at 09:55

## 2018-05-24 RX ADMIN — PROPOFOL 20 MG: 10 INJECTION, EMULSION INTRAVENOUS at 10:10

## 2018-05-24 RX ADMIN — PROPOFOL 20 MG: 10 INJECTION, EMULSION INTRAVENOUS at 10:18

## 2018-05-24 RX ADMIN — PROPOFOL 20 MG: 10 INJECTION, EMULSION INTRAVENOUS at 10:03

## 2018-05-24 RX ADMIN — ACETAMINOPHEN 975 MG: 325 TABLET ORAL at 08:19

## 2018-05-24 ASSESSMENT — ENCOUNTER SYMPTOMS: SEIZURES: 0

## 2018-05-24 NOTE — IP AVS SNAPSHOT
MRN:[id number]                      After Visit Summary   5/24/2018    Gerry Palafox    MRN: [id number]           Thank you!     Thank you for choosing [facility] for your care. Our goal is always to provide you with excellent care. Hearing back from our patients is one way we can continue to improve our services. Please take a few minutes to complete the written survey that you may receive in the mail after you visit with us. Thank you!        Patient Information     Date Of Birth          1975        About your hospital stay     You were admitted on:  May 24, 2018 You last received care in the:  [facility]    You were discharged on:  May 24, 2018       Who to Call     For medical emergencies, please call 911.  For non-urgent questions about your medical care, please call your primary care provider or clinic, [phone]  For questions related to your surgery, please call your surgery clinic        Attending Provider     Provider Specialty    Torsten Morelos MD Surgery       Primary Care Provider Office Phone # Fax #    Pooja CHARO [facility] [phone]      After Care Instructions     Diet Instructions       Resume pre-procedure diet            Discharge Instructions       Follow up appointment will be arranged by post op phone call from RN            No lifting       No lifting over 15 lbs and no strenuous physical activity for 3 weeks            Shower       No shower for 24 hours post procedure. May shower Postoperative Day (POD)  1                  Further instructions from your care team       [facility]  Home Care Following Anesthesia  For 24 hours after surgery:  1. Get plenty of rest.  A responsible adult must stay with you for at least 24 hours after you leave the surgery center.  2. Do not drive or use heavy equipment.  If you have weakness or tingling, don't drive or use heavy equipment until  this feeling goes away.   3. Do not drink alcohol.   4. Avoid strenuous or risky activities.  Ask for help when climbing stairs.  5. You may feel lightheaded.  IF so, sit for a few minutes before standing.  Have someone help you get up.   6. If you have nausea (feel sick to your stomach): Drink only clear liquids such as apple juice, ginger ale, broth or 7-Up.  Rest may also help.  Be sure to drink enough fluids.  Move to a regular diet as you feel able.   7. You may have a slight fever.  Call the doctor if your fever is over 100 F (37.7 C) (taken under the tongue) or lasts longer than 24 hours.  8. You may have a dry mouth, a sore throat, muscle aches or trouble sleeping. These should go away after 24 hours.  9. Do not make important or legal decisions.     Tips for taking pain medications  To get the best pain relief possible, remember these points:    Take pain medications as directed, before pain becomes severe.    Pain medication can upset your stomach: taking it with food may help.    Constipation is a common side effect of pain medication. Drink plenty of  fluids.    Eat foods high in fiber. Take a stool softener if recommended by your doctor or pharmacist.    Do not drink alcohol, drive or operate machinery while taking pain medications.    Ask about other ways to control pain, such as with heat, ice or relaxation.    Tylenol/Acetaminophen Consumption  To help encourage the safe use of acetaminophen, the makers of TYLENOL  have lowered the maximum daily dose for single-ingredient Extra Strength TYLENOL  (acetaminophen) products sold in the [place] from 8 pills per day (4,000 mg) to 6 pills per day (3,000 mg). The dosing interval has also changed from 2 pills every 4-6 hours to 2 pills every 6 hours.    If you feel your pain relief is insufficient, you may take Tylenol/Acetaminophen in addition to your narcotic pain medication.     Be careful not to exceed 3,000 mg of Tylenol/Acetaminophen in a 24 hour period  "from all sources.    If you are taking extra strength Tylenol/acetaminophen (500 mg), the maximum dose is 6 tablets in 24 hours.    If you are taking regular strength acetaminophen (325 mg), the maximum dose is 9 tablets in 24 hours.    Call a doctor for any of the followin. Signs of infection (fever, growing tenderness at the surgery site, a large amount of drainage or bleeding, severe pain, foul-smelling drainage, redness, swelling).  2. It has been over 8 to 10 hours since surgery and you are still not able to urinate (pass water).  3. Headache for over 24 hours.  Your doctor is:  Dr. Torsten Morelos, General Surgery: [phone]                    Or dial [phone] and ask for the resident on call for:  General Surgery  For emergency care, call the:  [facility]:  [phone] (TTY for hearing impaired: [phone])                Pending Results     No orders found from 2018 to 2018.            Admission Information     Date & Time Provider Department Dept. Phone    2018 Torsten Morelos MD [facility] [phone]      Your Vitals Were     Blood Pressure Temperature Respirations Height Weight Pulse Oximetry    133/80 97.9  F (36.6  C) (Oral) 16 1.626 m (5' 4\") 97.1 kg (214 lb) 95%    BMI (Body Mass Index)                   36.73 kg/m2           [employer] Information     [employer] gives you secure access to your electronic health record. If you see a primary care provider, you can also send messages to your care team and make appointments. If you have questions, please call your primary care clinic.  If you do not have a primary care provider, please call [phone] and they will assist you.      [employer] is an electronic gateway that provides easy, online access to your medical records. With [employer], you can request a clinic appointment, read your test results, renew a prescription or communicate with your care team.     To access your " existing account, please contact your [facility] or call [phone] for assistance.        Care EveryWhere ID     This is your Care EveryWhere ID. This could be used by other organizations to access your [place] medical records  [id number]        Equal Access to Services     SHEY FERNANDEZ : Olive Angeles, wayusufda luqadaha, qaestephaniata kaalmada springevandemetra, guillaume wolfmaryawhit parham. So [facility] [phone].    ATENCIÓN: Si habla español, tiene a gonzalez disposición servicios gratuitos de asistencia lingüística. Germainame al [phone].    We comply with applicable federal civil rights laws and [place] laws. We do not discriminate on the basis of race, color, national origin, age, disability, sex, sexual orientation, or gender identity.               Review of your medicines      START taking        Dose / Directions    HYDROcodone-acetaminophen 5-325 MG per tablet   Commonly known as:  NORCO   Used for:  Umbilical hernia without obstruction and without gangrene        Dose:  1-2 tablet   Take 1-2 tablets by mouth every 4 hours as needed for other (Moderate to Severe Pain)   Quantity:  20 tablet   Refills:  0       senna-docusate 8.6-50 MG per tablet   Commonly known as:  SENOKOT-S;PERICOLACE   Used for:  Umbilical hernia without obstruction and without gangrene        Dose:  1-2 tablet   Take 1-2 tablets by mouth 2 times daily Take while on oral narcotics to prevent or treat constipation.   Quantity:  30 tablet   Refills:  0         CONTINUE these medicines which have NOT CHANGED        Dose / Directions    * albuterol 108 (90 Base) MCG/ACT Inhaler   Commonly known as:  PROAIR HFA/PROVENTIL HFA/VENTOLIN HFA   Used for:  Mild intermittent asthma, uncomplicated        Dose:  2 puff   Inhale 2 puffs into the lungs every 6 hours as needed for shortness of breath / dyspnea or wheezing   Quantity:  3 Inhaler   Refills:  3       * albuterol (2.5 MG/3ML) 0.083% neb solution    Used for:  Asthma flare, Mild persistent asthma without complication        Dose:  1 vial   Take 1 vial (2.5 mg) by nebulization every 6 hours as needed for shortness of breath / dyspnea or wheezing   Quantity:  30 vial   Refills:  0       GLUCOSAMINE SULFATE PO        Refills:  0       IBUPROFEN PO        Dose:  400 mg   Take 400 mg by mouth 2 times daily as needed for moderate pain   Refills:  0       loratadine 10 MG tablet   Commonly known as:  CLARITIN        Dose:  10 mg   Take 10 mg by mouth daily   Refills:  0       montelukast 10 MG tablet   Commonly known as:  SINGULAIR   Used for:  Moderate asthma        TAKE 1 TABLET (10 MG) BY MOUTH AT BEDTIME   Quantity:  90 tablet   Refills:  1       order for DME   Used for:  Asthma flare, Mild persistent asthma        Equipment being ordered: Nebulizer   Quantity:  1 Device   Refills:  0       * Notice:  This list has 2 medication(s) that are the same as other medications prescribed for you. Read the directions carefully, and ask your doctor or other care provider to review them with you.         Where to get your medicines      These medications were sent to [address] [address]    Hours:  TRANSPLANT PHONE NUMBER [phone] Phone:  [phone]     senna-docusate 8.6-50 MG per tablet         Some of these will need a paper prescription and others can be bought over the counter. Ask your nurse if you have questions.     Bring a paper prescription for each of these medications     HYDROcodone-acetaminophen 5-325 MG per tablet                Protect others around you: Learn how to safely use, store and throw away your medicines at www.disposemymeds.org.        Information about OPIOIDS     PRESCRIPTION OPIOIDS: WHAT YOU NEED TO KNOW   You have a prescription for an opioid (narcotic) pain medicine. Opioids can cause addiction. If you have a history of  chemical dependency of any type, you are at a higher risk of becoming addicted to opioids. Only take this medicine after all other options have been tried. Take it for as short a time and as few doses as possible.     Do not:    Drive. If you drive while taking these medicines, you could be arrested for driving under the influence (DUI).    Operate heavy machinery    Do any other dangerous activities while taking these medicines.     Drink any alcohol while taking these medicines.      Take with any other medicines that contain acetaminophen. Read all labels carefully. Look for the word  acetaminophen  or  Tylenol.  Ask your pharmacist if you have questions or are unsure.    Store your pills in a secure place, locked if possible. We will not replace any lost or stolen medicine. If you don t finish your medicine, please throw away (dispose) as directed by your pharmacist. The [place] Pollution Control Agency has more information about safe disposal: https://www.pca.[facility].[place].us/living-green/managing-unwanted-medications    All opioids tend to cause constipation. Drink plenty of water and eat foods that have a lot of fiber, such as fruits, vegetables, prune juice, apple juice and high-fiber cereal. Take a laxative (Miralax, milk of magnesia, Colace, Senna) if you don t move your bowels at least every other day.              Medication List: This is a list of all your medications and when to take them. Check marks below indicate your daily home schedule. Keep this list as a reference.      Medications           Morning Afternoon Evening Bedtime As Needed    * albuterol 108 (90 Base) MCG/ACT Inhaler   Commonly known as:  PROAIR HFA/PROVENTIL HFA/VENTOLIN HFA   Inhale 2 puffs into the lungs every 6 hours as needed for shortness of breath / dyspnea or wheezing                                * albuterol (2.5 MG/3ML) 0.083% neb solution   Take 1 vial (2.5 mg) by nebulization every 6 hours as needed for shortness of breath  / dyspnea or wheezing                                GLUCOSAMINE SULFATE PO                                HYDROcodone-acetaminophen 5-325 MG per tablet   Commonly known as:  NORCO   Take 1-2 tablets by mouth every 4 hours as needed for other (Moderate to Severe Pain)                                IBUPROFEN PO   Take 400 mg by mouth 2 times daily as needed for moderate pain                                loratadine 10 MG tablet   Commonly known as:  CLARITIN   Take 10 mg by mouth daily                                montelukast 10 MG tablet   Commonly known as:  SINGULAIR   TAKE 1 TABLET (10 MG) BY MOUTH AT BEDTIME                                order for DME   Equipment being ordered: Nebulizer                                senna-docusate 8.6-50 MG per tablet   Commonly known as:  SENOKOT-S;PERICOLACE   Take 1-2 tablets by mouth 2 times daily Take while on oral narcotics to prevent or treat constipation.                                * Notice:  This list has 2 medication(s) that are the same as other medications prescribed for you. Read the directions carefully, and ask your doctor or other care provider to review them with you.

## 2018-05-24 NOTE — DISCHARGE INSTRUCTIONS
[facility]  Home Care Following Anesthesia  For 24 hours after surgery:  1. Get plenty of rest.  A responsible adult must stay with you for at least 24 hours after you leave the surgery center.  2. Do not drive or use heavy equipment.  If you have weakness or tingling, don't drive or use heavy equipment until this feeling goes away.   3. Do not drink alcohol.   4. Avoid strenuous or risky activities.  Ask for help when climbing stairs.  5. You may feel lightheaded.  IF so, sit for a few minutes before standing.  Have someone help you get up.   6. If you have nausea (feel sick to your stomach): Drink only clear liquids such as apple juice, ginger ale, broth or 7-Up.  Rest may also help.  Be sure to drink enough fluids.  Move to a regular diet as you feel able.   7. You may have a slight fever.  Call the doctor if your fever is over 100 F (37.7 C) (taken under the tongue) or lasts longer than 24 hours.  8. You may have a dry mouth, a sore throat, muscle aches or trouble sleeping. These should go away after 24 hours.  9. Do not make important or legal decisions.     Tips for taking pain medications  To get the best pain relief possible, remember these points:    Take pain medications as directed, before pain becomes severe.    Pain medication can upset your stomach: taking it with food may help.    Constipation is a common side effect of pain medication. Drink plenty of  fluids.    Eat foods high in fiber. Take a stool softener if recommended by your doctor or pharmacist.    Do not drink alcohol, drive or operate machinery while taking pain medications.    Ask about other ways to control pain, such as with heat, ice or relaxation.    Tylenol/Acetaminophen Consumption  To help encourage the safe use of acetaminophen, the makers of TYLENOL  have lowered the maximum daily dose for single-ingredient Extra Strength TYLENOL  (acetaminophen) products sold in the [place] from 8 pills per day  (4,000 mg) to 6 pills per day (3,000 mg). The dosing interval has also changed from 2 pills every 4-6 hours to 2 pills every 6 hours.    If you feel your pain relief is insufficient, you may take Tylenol/Acetaminophen in addition to your narcotic pain medication.     Be careful not to exceed 3,000 mg of Tylenol/Acetaminophen in a 24 hour period from all sources.    If you are taking extra strength Tylenol/acetaminophen (500 mg), the maximum dose is 6 tablets in 24 hours.    If you are taking regular strength acetaminophen (325 mg), the maximum dose is 9 tablets in 24 hours.    Call a doctor for any of the followin. Signs of infection (fever, growing tenderness at the surgery site, a large amount of drainage or bleeding, severe pain, foul-smelling drainage, redness, swelling).  2. It has been over 8 to 10 hours since surgery and you are still not able to urinate (pass water).  3. Headache for over 24 hours.  Your doctor is:  Dr. Torsten Morelos, General Surgery: [phone]                    Or dial [phone] and ask for the resident on call for:  General Surgery  For emergency care, call the:  [facility]:  [phone] (TTY for hearing impaired: [phone])

## 2018-05-24 NOTE — ANESTHESIA PREPROCEDURE EVALUATION
Anesthesia Evaluation     . Pt has had prior anesthetic.     No history of anesthetic complications          ROS/MED HX    ENT/Pulmonary:     (+)sleep apnea (moderate), Intermittent asthma (albuterol prn - uses a few times a year) uses CPAP , . .    Neurologic:  - neg neurologic ROS    (-) seizures   Cardiovascular:     (+) Dyslipidemia, ----. : . . . :. .       METS/Exercise Tolerance:     Hematologic:  - neg hematologic  ROS       Musculoskeletal:  - neg musculoskeletal ROS       GI/Hepatic:  - neg GI/hepatic ROS       Renal/Genitourinary:  - ROS Renal section negative       Endo: Comment: BMI 36.81    (+) Obesity, .      Psychiatric:  - neg psychiatric ROS       Infectious Disease:  - neg infectious disease ROS       Malignancy:         Other:                     Physical Exam  Normal systems: dental    Airway   Mallampati: II  TM distance: >3 FB  Neck ROM: full    Dental     Cardiovascular       Pulmonary                     Anesthesia Plan      History & Physical Review  History and physical reviewed and following examination; no interval change.    ASA Status:  3 .    NPO Status:  > 8 hours    Plan for MAC Reason for MAC:  Deep or markedly invasive procedure (G8)    Risks, benefits, and alternatives of MAC discussed with patient. They understand the risks including possible recall of events in the room. They understand there is a possibility that conversion to general anesthesia may be needed. Patient consents.          Postoperative Care      Consents  Anesthetic plan, risks, benefits and alternatives discussed with:  Patient..                          .

## 2018-05-24 NOTE — OP NOTE
Procedure Date: 2018      PREOPERATIVE DIAGNOSIS:  Umbilical hernia.      POSTOPERATIVE DIAGNOSIS:  Umbilical hernia.      OPERATIVE PROCEDURE:  Open mesh repair of umbilical hernia.      SURGEON:  Torsten Morelos MD      ANESTHESIA:  IV sedation plus local infiltration of Marcaine.      INDICATIONS FOR PROCEDURE:  The patient presents with a symptomatic umbilical hernia.  Informed consent was obtained.      OPERATIVE FINDINGS:  Umbilical hernia, approximately 3 x 3 cm defect.      PROCEDURE:  The patient was brought to the operating room and put under IV sedation.  The periumbilical region was widely prepped and draped in the usual sterile fashion.  I injected with Marcaine throughout for adequate anesthetic.  An infraumbilical skin incision was made.  Dissection was carried down to the fascia.  I circumferentially cleared around the hernia sac, which was cleared and pulled away from the overlying umbilical skin and tucked into the preperitoneal space.  The preperitoneal space was then developed widely to allow for placement of a 6 x 6 cm Parietex ProGrip mesh, which was placed in an underlay per my routine and secured out laterally with interrupted running 2-0 PDS.  The fascial defect was then closed with a running locked 0 PDS, thus closing the defect over the underlying mesh.  Subcuticular skin was then tacked down with a 3-0 Vicryl, skin closed with subcuticular, and Steri-Strips were applied.  Estimated blood loss was minimal.  The patient tolerated the procedure well and was taken to the recovery room, where he was without difficulty or apparent complication.         TORSTEN MORELOS MD             D: 2018   T: 2018   MT: [email or web address]      Name:     MERRILL KUMARI   MRN:      [id number]        Account:        [id number]   :      1975           Procedure Date: 2018      Document: [id number]       cc: [facility] Billing

## 2018-05-24 NOTE — BRIEF OP NOTE
[facility] Brief Operative Note    Pre-operative diagnosis: Hernia   Post-operative diagnosis Same as Pre-op Dx   Procedure: Procedure(s):  Open Mesh Repair Umbilical Hernia - Wound Class: I-Clean   Surgeon: Torsten Morelos MD   Assistants(s):    Estimated blood loss: Minimal    Specimens: None   Findings: umb twyla

## 2018-05-24 NOTE — ANESTHESIA POSTPROCEDURE EVALUATION
Patient: Gerry Palafox    Procedure(s):  Open Mesh Repair Umbilical Hernia - Wound Class: I-Clean    Diagnosis:Hernia  Diagnosis Additional Information: No value filed.    Anesthesia Type:  MAC    Note:  Anesthesia Post Evaluation    Patient location during evaluation: Phase 2  Patient participation: Able to fully participate in evaluation  Level of consciousness: awake and alert  Pain management: adequate  Airway patency: patent  Cardiovascular status: acceptable and stable  Respiratory status: room air and acceptable  Hydration status: acceptable  PONV: none     Anesthetic complications: None          Last vitals:  Vitals:    05/24/18 1045 05/24/18 1056 05/24/18 1113   BP: 115/68 125/75 122/72   Resp: 16 16 16   Temp: 36.8  C (98.3  F) 36.8  C (98.3  F) 36.8  C (98.2  F)   SpO2: 99% 98% 98%         Electronically Signed By: Rossana Almanzar MD  May 24, 2018  11:19 AM

## 2018-05-24 NOTE — IP AVS SNAPSHOT
[facility]    [address] [place] [address]    Phone:  [phone]    Fax:  [phone]                                       After Visit Summary   5/24/2018    Gerry Palafox    MRN: [id number]           After Visit Summary Signature Page     I have received my discharge instructions, and my questions have been answered. I have discussed any challenges I see with this plan with the nurse or doctor.    ..........................................................................................................................................  Patient/Patient Representative Signature      ..........................................................................................................................................  Patient Representative Print Name and Relationship to Patient    ..................................................               ................................................  Date                                            Time    ..........................................................................................................................................  Reviewed by Signature/Title    ...................................................              ..............................................  Date                                                            Time

## 2018-05-24 NOTE — ANESTHESIA CARE TRANSFER NOTE
Patient: Gerry Palafox    Procedure(s):  Open Mesh Repair Umbilical Hernia - Wound Class: I-Clean    Diagnosis: Hernia  Diagnosis Additional Information: No value filed.    Anesthesia Type:   MAC     Note:  Airway :Room Air  Patient transferred to:Phase II  Comments: Arrive Phase II, Stable, Airway Intact  115/68, 74,16,97,98.1  All questions answered.      Vitals: (Last set prior to Anesthesia Care Transfer)    CRNA VITALS  5/24/2018 1002 - 5/24/2018 1039      5/24/2018             Pulse: 78    Ht Rate: 75    SpO2: 97 %    Resp Rate (set): 10                Electronically Signed By: CHARO Hines CRNA  May 24, 2018  10:39 AM

## 2018-05-27 ENCOUNTER — OFFICE VISIT (OUTPATIENT)
Dept: ORTHOPEDICS | Facility: CLINIC | Age: 43
End: 2018-05-27
Payer: COMMERCIAL

## 2018-05-27 VITALS
WEIGHT: 214 LBS | DIASTOLIC BLOOD PRESSURE: 62 MMHG | BODY MASS INDEX: 36.54 KG/M2 | SYSTOLIC BLOOD PRESSURE: 114 MMHG | HEIGHT: 64 IN | HEART RATE: 90 BPM

## 2018-05-27 DIAGNOSIS — M79.674 GREAT TOE PAIN, RIGHT: Primary | ICD-10-CM

## 2018-05-27 DIAGNOSIS — M10.071 IDIOPATHIC GOUT OF RIGHT FOOT, UNSPECIFIED CHRONICITY: ICD-10-CM

## 2018-05-27 DIAGNOSIS — M79.675 GREAT TOE PAIN, LEFT: ICD-10-CM

## 2018-05-27 NOTE — NURSING NOTE
[address] [address]  Dept: [phone]  ______________________________________________________________________________    Patient: Gerry Palafox   : 1975   MRN: [id number]   May 27, 2018    INVASIVE PROCEDURE SAFETY CHECKLIST    Date: 18   Procedure: R toe kenalog CSI and attempted aspiration  Patient Name: Gerry Palafox  MRN: [id number]  YOB: 1975    Action: Complete sections as appropriate. Any discrepancy results in a HARD COPY until resolved.     PRE PROCEDURE:  Patient ID verified with 2 identifiers (name and  or MRN): Yes  Procedure and site verified with patient/designee (when able): Yes  Accurate consent documentation in medical record: Yes  H&P (or appropriate assessment) documented in medical record: Yes  H&P must be up to 20 days prior to procedure and updates within 24 hours of procedure as applicable: Yes  Relevant diagnostic and radiology test results appropriately labeled and displayed as applicable: Yes  Procedure site(s) marked with provider initials: NA    TIMEOUT:  Time-Out performed immediately prior to starting procedure, including verbal and active participation of all team members addressing the following:Yes  * Correct patient identify  * Confirmed that the correct side and site are marked  * An accurate procedure consent form  * Agreement on the procedure to be done  * Correct patient position  * Relevant images and results are properly labeled and appropriately displayed  * The need to administer antibiotics or fluids for irrigation purposes during the procedure as applicable   * Safety precautions based on patient history or medication use    DURING PROCEDURE: Verification of correct person, site, and procedures any time the responsibility for care of the patient is transferred to another member of the care team.     The following medication was given:     MEDICATION:  Kenalog 20 mg  ROUTE:  IA  SITE: R great toe  DOSE: 20mg  LOT #: [id number]  : Intoo  EXPIRATION DATE: 01/2020  NDC#: [address]-6   Was there drug waste? Yes  Amount of drug waste (mL): .5.  Reason for waste:  Single use vial     MEDICATION:  Lidocaine without epinephrine  ROUTE: IA  SITE: R great toe  DOSE:  3cc for numbing, 1cc for injection  LOT #: [id number]  : GRAVIDI  EXPIRATION DATE: 02/22  NDC#: 25844-323-20   Was there drug waste? Yes  Amount of drug waste (mL): 1.  Reason for waste:  Single use vial      Ranjana Saleh, ATC  May 27, 2018

## 2018-05-27 NOTE — LETTER
5/27/2018       RE: Gerry Palafox  [address] [place] [address]     Dear Colleague,    Thank you for referring your patient, Gerry Palafox, to the [facility]ALK [facility]INIC at [facility]. Please see a copy of my visit note below.    CHIEF COMPLAINT:  RECHECK (R foot pain)       HISTORY OF PRESENT ILLNESS  Mr. Palafox is a pleasant 42 year old year old male who presents to clinic today with right foot pain and persistent sesamoiditis.  Gerry explains that she has had right foot pain for many years.  He describes condition as an acute onset of right MTP joint pain.  Pain flares seem to last around 1 week in duration and then subside.  Quality of pain ranges from discomfort with movement of MTP joint to more localized pain in the region of sesamoid.  Treatment to date has included wearing dancer's pad, custom orthotics, over-the-counter orthotics, MTP joint injection and localized ultrasound-guided sesamoid injection and anti-inflammatories.      Patient states that the most successful treatment has been MTP joint injection which was given last fall.  He had immediate relief after this time which provided about 3 months of relief.  Most recent sesamoid injection improved condition somewhat, but this was not as successful.    Most recent flare began about 3 days ago.  He states that he has been very inactive and is unsure why this would precipitate such pain.  He divulges that he had a recent laparoscopic hernia repair on 5/24/18.  He also has had mild left first MTP pain as well, but this is about 50% of the intensity of right.    Additional history: as documented    MEDICAL HISTORY  Patient Active Problem List   Diagnosis     Hyperlipidemia LDL goal <160     TERRIE (obstructive sleep apnea) AHI 21.8     Pain in joint, ankle and foot     Other postprocedural status(V45.89)     Mild persistent asthma     Chronic seasonal allergic rhinitis, unspecified trigger        Current Outpatient Prescriptions   Medication Sig Dispense Refill     albuterol (2.5 MG/3ML) 0.083% neb solution Take 1 vial (2.5 mg) by nebulization every 6 hours as needed for shortness of breath / dyspnea or wheezing 30 vial 0     albuterol (PROAIR HFA/PROVENTIL HFA/VENTOLIN HFA) 108 (90 BASE) MCG/ACT Inhaler Inhale 2 puffs into the lungs every 6 hours as needed for shortness of breath / dyspnea or wheezing 3 Inhaler 3     GLUCOSAMINE SULFATE PO        HYDROcodone-acetaminophen (NORCO) 5-325 MG per tablet Take 1-2 tablets by mouth every 4 hours as needed for other (Moderate to Severe Pain) 20 tablet 0     IBUPROFEN PO Take 400 mg by mouth 2 times daily as needed for moderate pain       loratadine (CLARITIN) 10 MG tablet Take 10 mg by mouth daily       montelukast (SINGULAIR) 10 MG tablet TAKE 1 TABLET (10 MG) BY MOUTH AT BEDTIME 90 tablet 1     ORDER FOR DME Equipment being ordered: Nebulizer 1 Device 0     senna-docusate (SENOKOT-S;PERICOLACE) 8.6-50 MG per tablet Take 1-2 tablets by mouth 2 times daily Take while on oral narcotics to prevent or treat constipation. 30 tablet 0       Allergies   Allergen Reactions     Penicillin G      hives       Family History   Problem Relation Age of Onset     Arthritis Mother      CANCER Father      lung cancer      Other        Additional medical/Social/Surgical histories reviewed in [facility] and updated as appropriate.     REVIEW OF SYSTEMS (5/27/2018)  CONSTITUTIONAL: Denies fever and weight loss  EYES: Denies acute vision changes  ENT: Denies hearing changes or difficulty swallowing  CARDIAC: Denies chest pain or edema  RESPIRATORY: Denies dyspnea, cough or wheeze  GASTROINTESTINAL: Denies abdominal pain, nausea, vomiting  MUSCULOSKELETAL: See HPI  SKIN: Denies any recent rash or lesion  NEUROLOGICAL: Denies numbness or focal weakness  PSYCHIATRIC: No history of psychiatric symptoms or problems  ENDOCRINE: Diagnosis of diabetes: None  HEMATOLOGY: Denies episodes of  "easy bleeding      PHYSICAL EXAM  /62  Pulse 90  Ht 1.626 m (5' 4\")  Wt 97.1 kg (214 lb)  BMI 36.73 kg/m2    General  - normal appearance, in no obvious distress  CV  - normal pulses at posterior tib and dorsalis pedis  Pulm  - normal respiratory pattern, non-labored  Musculoskeletal -bilateral feet  - stance:   - inspection: faint erythema at right 1st MTP joint, swelling of R first MTP joint,  normal bone and joint alignment, no obvious deformity  - palpation: TTP circumferentially about the 1st MTP joint on left and right foot including region involving hallux sesamoids. Warmth of right 1st MTP joint.  - ROM: Limited flexion and extension of great toe on right actively.  Pain with passive movement at MTP bilaterally.    - strength: 5/5 in all planes  Neuro  - no sensory or motor deficit, grossly normal coordination, normal muscle tone  Skin  - no ecchymosis, erythema, warmth, or induration, no obvious rash  Psych  - interactive, appropriate, normal mood and affect    IMAGING : US IN-OFFICE LTD.  Right 1st MTP joint with evidence of increased effusion as compared to the left.  Hypoechoic compressible fluid with distension of capsule dorsally.     ASSESSMENT & PLAN  Mr. Palafox is a 42 year old year old male who underwent recent laparoscopic hernia repair on 5/24 presenting with R>L pain of 1st MTP joints.  I highly suspect that his pain and swelling is secondary to a flare of gout precipitated by recent surgery.  Though he has not been formally diagnosed in the past, he did have marked improvement in his condition with MTP joint injection after last flare, and only mild improvement with sesamoid injection.      We discussed treatment options and he opted to go forward with attempted aspiration and corticosteroid injection of right first MTP joint.  I opted against oral corticosteroids given recent postsurgical state.  Unfortunately were unable to obtain satisfactory amount of synovial fluid, however " injection was successful.      Diagnosis:   Bilateral 1st MTP joint pain    -Corticosteroid injection as below  -Ice to bilateral MTP joints  -Aleve 440 mg twice daily  -Labs; uric acid, esr, crp  -Follow up PCP to discuss possible gout prophylaxis    Right 1st MTP joint aspiration/injection - Ultrasound Guided  The patient was informed of the risks and the benefits of the procedure and a written consent was signed.  The patient s right 1st MTP joint was prepped with chlorhexidine in sterile fashion.   20 mg of triamcinolone suspension was drawn up into a 3 mL syringe with 1 mL of 1% lidocaine.  3cc lidocaine with 27-gauge needle used for initial anesthesia at dorsal aspect of joint.  Injection was performed using sterile technique.  Under ultrasound guidance a 1.5-inch 20-gauge needle was used to enter the 1st MTP joint of right foot.  Needle placement was visualized and documented with ultrasound.  Needle placed in short axis to the probe.  Ultrasound visualization was necessary due to decreased joint space in the setting of osteoarthritis.  Aspiration was attempted by pulling back on plunger with insufficient fluid return.  After unsuccessful aspiration attempts, 3cc syringe was removed and replaced with corticosteroid mixture and successfully injected.  Patient reported marked improvement in his pain and gait normalized after procedure.  Images were permanently stored for the patient's record.  There were no complications. The patient tolerated the procedure well. There was negligible bleeding.   The patient was instructed to ice the toe upon leaving clinic and refrain from overuse over the next 3 days.   The patient was instructed to call or go to the emergency room with any unusual pain, swelling, redness, or if otherwise concerned.    It was a pleasure seeing Gerry today.    Oscar Barry DO, KELSEYM  Primary Care Sports Medicine            SPORTS & ORTHOPEDIC WALK-IN FOLLOW-UP VISIT 5/27/2018    Interval  History:     Follow up reason: R great toe pain. Has had 2 1st MTP CSI    Date of injury: NA    Date last seen: 1/29/18-     Following Therapeutic Plan: Yes     Pain: Worsening    Function: Unchanged    Interval History: L toe started bothering him just a few days ago. Seems to notice his pain is worse during periods of inactivity.    Medical History:    Any recent changes to your medical history? Yes, hernia surgery 5/24/18    Any new medication prescribed since last visit? No

## 2018-05-27 NOTE — MR AVS SNAPSHOT
After Visit Summary   5/27/2018    Gerry Palafox    MRN: [id number]           Patient Information     Date Of Birth          1975        Visit Information        Provider Department      5/27/2018 9:20 AM Oscar Barry DO [facility]        Today's Diagnoses     Great toe pain, right    -  1    Great toe pain, left        Idiopathic gout of right foot, unspecified chronicity           Follow-ups after your visit        Future tests that were ordered for you today     Open Future Orders        Priority Expected Expires Ordered    Uric acid Routine  5/28/2019 5/27/2018    Erythrocyte sedimentation rate auto Routine  6/26/2018 5/27/2018    CRP inflammation Routine  6/26/2018 5/27/2018            Who to contact     Please call your clinic at [phone] to:    Ask questions about your health    Make or cancel appointments    Discuss your medicines    Learn about your test results    Speak to your doctor            Additional Information About Your Visit        MyChart Information     [facility] gives you secure access to your electronic health record. If you see a primary care provider, you can also send messages to your care team and make appointments. If you have questions, please call your primary care clinic.  If you do not have a primary care provider, please call [phone] and they will assist you.      [facility] is an electronic gateway that provides easy, online access to your medical records. With [facility], you can request a clinic appointment, read your test results, renew a prescription or communicate with your care team.     To access your existing account, please contact your [facility] or call [phone] for assistance.        Care EveryWhere ID     This is your Care EveryWhere ID. This could be used by other organizations to access your [facility] medical records  [id number]        Your Vitals Were     Pulse Height BMI  "(Body Mass Index)             90 1.626 m (5' 4\") 36.73 kg/m2          Blood Pressure from Last 3 Encounters:   05/27/18 114/62   05/24/18 122/72   05/21/18 132/82    Weight from Last 3 Encounters:   05/27/18 97.1 kg (214 lb)   05/24/18 97.1 kg (214 lb)   05/21/18 97.3 kg (214 lb 6.4 oz)              We Performed the Following     Small Joint/Bursa injection and/or drainage (Finger) [20600]     TRIAMCINOLONE ACET INJ NOS          Today's Medication Changes          These changes are accurate as of 5/27/18  5:55 PM.  If you have any questions, ask your nurse or doctor.               Start taking these medicines.        Dose/Directions    triamcinolone acetonide 10 MG/ML injection   Commonly known as:  KENALOG   Used for:  Great toe pain, right        Dose:  20 mg   2 mLs (20 mg) by INTRA-ARTICULAR route once for 1 dose   Quantity:  2 mL   Refills:  0            Where to get your medicines      Some of these will need a paper prescription and others can be bought over the counter.  Ask your nurse if you have questions.     You don't need a prescription for these medications     triamcinolone acetonide 10 MG/ML injection                Primary Care Provider Office Phone # Fax #    PoojaCHARO [facility] [phone]       [address] [address]        Equal Access to Services     SHEY FERNANDEZ : Hadii zenaida whalen hadtammio Sosuresh, waaxda luqadaha, qaybta kaalmada adelanayada, waxfrankie marge griffin . So [facility] [phone].    ATENCIÓN: Si habla español, tiene a gonzalez disposición servicios gratuitos de asistencia lingüística. Llame al [phone].    We comply with applicable federal civil rights laws and [place] laws. We do not discriminate on the basis of race, color, national origin, age, disability, sex, sexual orientation, or gender identity.            Thank you!     Thank you for choosing [facility]  for your care. Our goal is " always to provide you with excellent care. Hearing back from our patients is one way we can continue to improve our services. Please take a few minutes to complete the written survey that you may receive in the mail after your visit with us. Thank you!             Your Updated Medication List - Protect others around you: Learn how to safely use, store and throw away your medicines at www.disposemymeds.org.          This list is accurate as of 5/27/18  5:55 PM.  Always use your most recent med list.                   Brand Name Dispense Instructions for use Diagnosis    * albuterol 108 (90 Base) MCG/ACT Inhaler    PROAIR HFA/PROVENTIL HFA/VENTOLIN HFA    3 Inhaler    Inhale 2 puffs into the lungs every 6 hours as needed for shortness of breath / dyspnea or wheezing    Mild intermittent asthma, uncomplicated       * albuterol (2.5 MG/3ML) 0.083% neb solution     30 vial    Take 1 vial (2.5 mg) by nebulization every 6 hours as needed for shortness of breath / dyspnea or wheezing    Asthma flare, Mild persistent asthma without complication       GLUCOSAMINE SULFATE PO           HYDROcodone-acetaminophen 5-325 MG per tablet    NORCO    20 tablet    Take 1-2 tablets by mouth every 4 hours as needed for other (Moderate to Severe Pain)    Umbilical hernia without obstruction and without gangrene       IBUPROFEN PO      Take 400 mg by mouth 2 times daily as needed for moderate pain        loratadine 10 MG tablet    CLARITIN     Take 10 mg by mouth daily        montelukast 10 MG tablet    SINGULAIR    90 tablet    TAKE 1 TABLET (10 MG) BY MOUTH AT BEDTIME    Moderate asthma       order for DME     1 Device    Equipment being ordered: Nebulizer    Asthma flare, Mild persistent asthma       senna-docusate 8.6-50 MG per tablet    SENOKOT-S;PERICOLACE    30 tablet    Take 1-2 tablets by mouth 2 times daily Take while on oral narcotics to prevent or treat constipation.    Umbilical hernia without obstruction and without gangrene        triamcinolone acetonide 10 MG/ML injection    KENALOG    2 mL    2 mLs (20 mg) by INTRA-ARTICULAR route once for 1 dose    Great toe pain, right       * Notice:  This list has 2 medication(s) that are the same as other medications prescribed for you. Read the directions carefully, and ask your doctor or other care provider to review them with you.

## 2018-05-27 NOTE — PROGRESS NOTES
CHIEF COMPLAINT:  RECHECK (R foot pain)       HISTORY OF PRESENT ILLNESS  Mr. Palafox is a pleasant 42 year old year old male who presents to clinic today with right foot pain and persistent sesamoiditis.  Gerry explains that she has had right foot pain for many years.  He describes condition as an acute onset of right MTP joint pain.  Pain flares seem to last around 1 week in duration and then subside.  Quality of pain ranges from discomfort with movement of MTP joint to more localized pain in the region of sesamoid.  Treatment to date has included wearing dancer's pad, custom orthotics, over-the-counter orthotics, MTP joint injection and localized ultrasound-guided sesamoid injection and anti-inflammatories.      Patient states that the most successful treatment has been MTP joint injection which was given last fall.  He had immediate relief after this time which provided about 3 months of relief.  Most recent sesamoid injection improved condition somewhat, but this was not as successful.    Most recent flare began about 3 days ago.  He states that he has been very inactive and is unsure why this would precipitate such pain.  He divulges that he had a recent laparoscopic hernia repair on 5/24/18.  He also has had mild left first MTP pain as well, but this is about 50% of the intensity of right.    Additional history: as documented    MEDICAL HISTORY  Patient Active Problem List   Diagnosis     Hyperlipidemia LDL goal <160     TERRIE (obstructive sleep apnea) AHI 21.8     Pain in joint, ankle and foot     Other postprocedural status(V45.89)     Mild persistent asthma     Chronic seasonal allergic rhinitis, unspecified trigger       Current Outpatient Prescriptions   Medication Sig Dispense Refill     albuterol (2.5 MG/3ML) 0.083% neb solution Take 1 vial (2.5 mg) by nebulization every 6 hours as needed for shortness of breath / dyspnea or wheezing 30 vial 0     albuterol (PROAIR HFA/PROVENTIL HFA/VENTOLIN HFA) 108 (90  "BASE) MCG/ACT Inhaler Inhale 2 puffs into the lungs every 6 hours as needed for shortness of breath / dyspnea or wheezing 3 Inhaler 3     GLUCOSAMINE SULFATE PO        HYDROcodone-acetaminophen (NORCO) 5-325 MG per tablet Take 1-2 tablets by mouth every 4 hours as needed for other (Moderate to Severe Pain) 20 tablet 0     IBUPROFEN PO Take 400 mg by mouth 2 times daily as needed for moderate pain       loratadine (CLARITIN) 10 MG tablet Take 10 mg by mouth daily       montelukast (SINGULAIR) 10 MG tablet TAKE 1 TABLET (10 MG) BY MOUTH AT BEDTIME 90 tablet 1     ORDER FOR DME Equipment being ordered: Nebulizer 1 Device 0     senna-docusate (SENOKOT-S;PERICOLACE) 8.6-50 MG per tablet Take 1-2 tablets by mouth 2 times daily Take while on oral narcotics to prevent or treat constipation. 30 tablet 0       Allergies   Allergen Reactions     Penicillin G      hives       Family History   Problem Relation Age of Onset     Arthritis Mother      CANCER Father      lung cancer      Other        Additional medical/Social/S[facility] and updated as appropriate.     REVIEW OF SYSTEMS (5/27/2018)  CONSTITUTIONAL: Denies fever and weight loss  EYES: Denies acute vision changes  ENT: Denies hearing changes or difficulty swallowing  CARDIAC: Denies chest pain or edema  RESPIRATORY: Denies dyspnea, cough or wheeze  GASTROINTESTINAL: Denies abdominal pain, nausea, vomiting  MUSCULOSKELETAL: See HPI  SKIN: Denies any recent rash or lesion  NEUROLOGICAL: Denies numbness or focal weakness  PSYCHIATRIC: No history of psychiatric symptoms or problems  ENDOCRINE: Diagnosis of diabetes: None  HEMATOLOGY: Denies episodes of easy bleeding      PHYSICAL EXAM  /62  Pulse 90  Ht 1.626 m (5' 4\")  Wt 97.1 kg (214 lb)  BMI 36.73 kg/m2    General  - normal appearance, in no obvious distress  CV  - normal pulses at posterior tib and dorsalis pedis  Pulm  - normal respiratory pattern, non-labored  Musculoskeletal " -bilateral feet  - stance:   - inspection: faint erythema at right 1st MTP joint, swelling of R first MTP joint,  normal bone and joint alignment, no obvious deformity  - palpation: TTP circumferentially about the 1st MTP joint on left and right foot including region involving hallux sesamoids. Warmth of right 1st MTP joint.  - ROM: Limited flexion and extension of great toe on right actively.  Pain with passive movement at MTP bilaterally.    - strength: 5/5 in all planes  Neuro  - no sensory or motor deficit, grossly normal coordination, normal muscle tone  Skin  - no ecchymosis, erythema, warmth, or induration, no obvious rash  Psych  - interactive, appropriate, normal mood and affect    IMAGING : US IN-OFFICE LTD.  Right 1st MTP joint with evidence of increased effusion as compared to the left.  Hypoechoic compressible fluid with distension of capsule dorsally.     ASSESSMENT & PLAN  Mr. Palafox is a 42 year old year old male who underwent recent laparoscopic hernia repair on 5/24 presenting with R>L pain of 1st MTP joints.  I highly suspect that his pain and swelling is secondary to a flare of gout precipitated by recent surgery.  Though he has not been formally diagnosed in the past, he did have marked improvement in his condition with MTP joint injection after last flare, and only mild improvement with sesamoid injection.      We discussed treatment options and he opted to go forward with attempted aspiration and corticosteroid injection of right first MTP joint.  I opted against oral corticosteroids given recent postsurgical state.  Unfortunately were unable to obtain satisfactory amount of synovial fluid, however injection was successful.      Diagnosis:   Bilateral 1st MTP joint pain    -Corticosteroid injection as below  -Ice to bilateral MTP joints  -Aleve 440 mg twice daily  -Labs; uric acid, esr, crp  -Follow up PCP to discuss possible gout prophylaxis    Right 1st MTP joint aspiration/injection -  Ultrasound Guided  The patient was informed of the risks and the benefits of the procedure and a written consent was signed.  The patient s right 1st MTP joint was prepped with chlorhexidine in sterile fashion.   20 mg of triamcinolone suspension was drawn up into a 3 mL syringe with 1 mL of 1% lidocaine.  3cc lidocaine with 27-gauge needle used for initial anesthesia at dorsal aspect of joint.  Injection was performed using sterile technique.  Under ultrasound guidance a 1.5-inch 20-gauge needle was used to enter the 1st MTP joint of right foot.  Needle placement was visualized and documented with ultrasound.  Needle placed in short axis to the probe.  Ultrasound visualization was necessary due to decreased joint space in the setting of osteoarthritis.  Aspiration was attempted by pulling back on plunger with insufficient fluid return.  After unsuccessful aspiration attempts, 3cc syringe was removed and replaced with corticosteroid mixture and successfully injected.  Patient reported marked improvement in his pain and gait normalized after procedure.  Images were permanently stored for the patient's record.  There were no complications. The patient tolerated the procedure well. There was negligible bleeding.   The patient was instructed to ice the toe upon leaving clinic and refrain from overuse over the next 3 days.   The patient was instructed to call or go to the emergency room with any unusual pain, swelling, redness, or if otherwise concerned.    It was a pleasure seeing Gerry today.    Oscar Barry DO, CAM  Primary Care Sports Medicine

## 2018-05-27 NOTE — PROGRESS NOTES
SPORTS & ORTHOPEDIC WALK-IN FOLLOW-UP VISIT 5/27/2018    Interval History:     Follow up reason: R great toe pain. Has had 2 1st MTP CSI    Date of injury: NA    Date last seen: 1/29/18-     Following Therapeutic Plan: Yes     Pain: Worsening    Function: Unchanged    Interval History: L toe started bothering him just a few days ago. Seems to notice his pain is worse during periods of inactivity.    Medical History:    Any recent changes to your medical history? Yes, hernia surgery 5/24/18    Any new medication prescribed since last visit? No

## 2018-05-29 ENCOUNTER — CARE COORDINATION (OUTPATIENT)
Dept: SURGERY | Facility: CLINIC | Age: 43
End: 2018-05-29

## 2018-05-29 ENCOUNTER — OFFICE VISIT (OUTPATIENT)
Dept: FAMILY MEDICINE | Facility: CLINIC | Age: 43
End: 2018-05-29
Payer: COMMERCIAL

## 2018-05-29 VITALS
RESPIRATION RATE: 16 BRPM | BODY MASS INDEX: 36.05 KG/M2 | DIASTOLIC BLOOD PRESSURE: 78 MMHG | OXYGEN SATURATION: 97 % | TEMPERATURE: 98.7 F | SYSTOLIC BLOOD PRESSURE: 120 MMHG | WEIGHT: 210 LBS | HEART RATE: 79 BPM

## 2018-05-29 DIAGNOSIS — M79.675 GREAT TOE PAIN, LEFT: ICD-10-CM

## 2018-05-29 DIAGNOSIS — M10.9 ACUTE GOUT OF FOOT, UNSPECIFIED CAUSE, UNSPECIFIED LATERALITY: Primary | ICD-10-CM

## 2018-05-29 DIAGNOSIS — M79.674 GREAT TOE PAIN, RIGHT: ICD-10-CM

## 2018-05-29 LAB
CRP SERPL-MCNC: 15 MG/L (ref 0–8)
ERYTHROCYTE [SEDIMENTATION RATE] IN BLOOD BY WESTERGREN METHOD: 17 MM/H (ref 0–15)

## 2018-05-29 PROCEDURE — 85652 RBC SED RATE AUTOMATED: CPT | Performed by: FAMILY MEDICINE

## 2018-05-29 PROCEDURE — 86140 C-REACTIVE PROTEIN: CPT | Performed by: FAMILY MEDICINE

## 2018-05-29 PROCEDURE — 36415 COLL VENOUS BLD VENIPUNCTURE: CPT | Performed by: FAMILY MEDICINE

## 2018-05-29 PROCEDURE — 99213 OFFICE O/P EST LOW 20 MIN: CPT | Performed by: NURSE PRACTITIONER

## 2018-05-29 PROCEDURE — 84550 ASSAY OF BLOOD/URIC ACID: CPT | Performed by: FAMILY MEDICINE

## 2018-05-29 RX ORDER — PREDNISONE 20 MG/1
40 TABLET ORAL DAILY
Qty: 10 TABLET | Refills: 0 | Status: SHIPPED | OUTPATIENT
Start: 2018-05-29 | End: 2018-06-03

## 2018-05-29 NOTE — PROGRESS NOTES
SUBJECTIVE:   Gerry Palafox is a 42 year old male who presents to clinic today for the following health issues:    Pain in both feet    Onset: 5/25/2018    Description:   Location: Both feet   Character: Sharp and throbbing     Intensity: severe    Progression of Symptoms: better right foot, worse left foot    Accompanying Signs & Symptoms:  Other symptoms: warmth, swelling and redness    History:   Previous similar pain: YES      Precipitating factors:   Trauma or overuse: YES    Alleviating factors:  Improved by: heat and Aleve, and tylenol     Therapies Tried and outcome:   heat and Aleve, and tylenol     Had injections before, November side of right foot toe, Jan or Feb seismoids and this Sunday top of big toe he did at walk in clinic. They pended lab work and considered steroids-never needed before     2013 had normal uric acid   , works on feet  No injuries, has worn inserts, good shoes wears in house no pressure on toes, still severe pain    Does have diet high in prurines, beer, meat and cheeses, shellfish, asparagus    Problem list and histories reviewed & adjusted, as indicated.  Additional history: as documented    Patient Active Problem List   Diagnosis     Hyperlipidemia LDL goal <160     TERRIE (obstructive sleep apnea) AHI 21.8     Pain in joint, ankle and foot     Other postprocedural status(V45.89)     Mild persistent asthma     Chronic seasonal allergic rhinitis, unspecified trigger     Past Surgical History:   Procedure Laterality Date     HERNIORRHAPHY UMBILICAL N/A 5/24/2018    Procedure: HERNIORRHAPHY UMBILICAL;  Open Mesh Repair Umbilical Hernia;  Surgeon: Torsten Morelos MD;  Location:  OR       Social History   Substance Use Topics     Smoking status: Never Smoker     Smokeless tobacco: Never Used     Alcohol use Yes      Comment: varies     Family History   Problem Relation Age of Onset     Arthritis Mother      CANCER Father      lung cancer      Other          Allergies    Allergen Reactions     Penicillin G      hives     BP Readings from Last 3 Encounters:   05/29/18 120/78   05/27/18 114/62   05/24/18 122/72    Wt Readings from Last 3 Encounters:   05/29/18 210 lb (95.3 kg)   05/27/18 214 lb (97.1 kg)   05/24/18 214 lb (97.1 kg)                  Labs reviewed in EPIC    Reviewed and updated as needed this visit by clinical staff       Reviewed and updated as needed this visit by Provider         ROS:  CONSTITUTIONAL: NEGATIVE for fever, chills, change in weight  INTEGUMENTARY/SKIN: NEGATIVE for worrisome rashes, moles or lesions  RESP: NEGATIVE for significant cough or SOB  CV: NEGATIVE for chest pain, palpitations or peripheral edema  MUSCULOSKELETAL: see HPI   NEURO: NEGATIVE for weakness, dizziness or paresthesias  ENDOCRINE: NEGATIVE for temperature intolerance, skin/hair changes    OBJECTIVE:     /78  Pulse 79  Temp 98.7  F (37.1  C) (Oral)  Resp 16  Wt 210 lb (95.3 kg)  SpO2 97%  BMI 36.05 kg/m2  Body mass index is 36.05 kg/(m^2).  GENERAL: healthy, alert and no distress  RESP: Respiratory rate regular and breathing easily   CV: No abnormal color and extremities warm, normal pulses and cap refill   MS: pain with palpation of 1st MTP bilaterally, normal but painful rom of big toes, wearing shoes with good arch support and toe room, no apparent bunions   SKIN: no suspicious lesions or rashes  NEURO: Normal strength and tone, mentation intact and speech normal    Diagnostic Test Results:  Results for orders placed or performed in visit on 05/29/18   Erythrocyte sedimentation rate auto   Result Value Ref Range    Sed Rate 17 (H) 0 - 15 mm/h        ASSESSMENT/PLAN:         ICD-10-CM    1. Acute gout of foot, unspecified cause, unspecified laterality M10.9 predniSONE (DELTASONE) 20 MG tablet   2. Great toe pain, right M79.674 Uric acid     Erythrocyte sedimentation rate auto     CRP inflammation   3. Great toe pain, left M79.675 Uric acid     Erythrocyte sedimentation  rate auto     CRP inflammation   very likely gout, will treat and lab work pending. No signs or symptoms of infection.   With high level of pain recommended steroid course and pt agrees, reviewed diet changes and ice frequently. Avoid alcohol, trial of celery seed 500 mg twice a day to prevent flares    Patient Instructions     Gout    Gout is an inflammation of a joint due to a build-up of gout crystals in the joint fluid. This occurs when there is an excess of uric acid (a normal waste product) in the body. Uric acid builds up in the body when the kidneys are unable to filter enough of it from the blood. This may occur with age. It is also associated with kidney disease. Gout occurs more often in people with obesity, diabetes, high blood pressure, or high levels of fats in the blood. It may run in families. Gout tends to come and go. A flare up of gout is called an attack. Drinking alcohol or eating certain foods (such as shellfish or foods with additives such as high-fructose corn syrup) may increase uric acid levels in the blood and cause a gout attack.  During a gout attack, the affected joint may become a hot, red, swollen and painful. If you have had one attack of gout, you are likely to have another. An attack of gout can be treated with medicine. If these attacks become frequent, a daily medicine may be prescribed to help the kidneys remove uric acid from the body.  Home care  During a gout attack:    Rest painful joints. If gout affects the joints of your foot or leg, you may want to use crutches for the first few days to keep from bearing weight on the affected joint.    When sitting or lying down, raise the painful joint to a level higher than your heart.    Apply an ice pack (ice cubes in a plastic bag wrapped in a thin towel) over the injured area for 20 minutes every 1 to 2 hours the first day for pain relief. Continue this 3 to 4 times a day for swelling and pain.    Avoid alcohol and foods listed  below (see Preventing attacks) during a gout attack. Drink extra fluid to help flush the uric acid through your kidneys.    If you were prescribed a medicine to treat gout, take it as your healthcare provider has instructed. Don't skip doses.    Take anti-inflammatory medicine as directed.     If pain medicines have been prescribed, take them exactly as directed.    Preventing attacks    Minimize or avoid alcohol use. Excess alcohol intake can cause a gout attack.    Limit these foods and beverages:  ? Organ meats, such as kidneys and liver  ? Certain seafoods (anchovies, sardines, shrimp, scallops, herring, mackerel)  ? Wild game, meat extracts and meat gravies  ? Foods and beverages sweetened with high-fructose corn syrup, such as sodas    Eat a healthy diet including low-fat and nonfat dairy, whole grains, and vegetables.    If you are overweight, talk to your healthcare provider about a weight reduction plan. Avoid fasting or extreme low calorie diets (less than 900 calories per day). This will increase uric acid levels in the body.    If you have diabetes or high blood pressure, work with your doctor to manage these conditions.    Protect the joint from injury. Trauma can trigger a gout attack.  Follow-up care  Follow up with your healthcare provider, or as advised.   When to seek medical advice  Call your healthcare provider if you have any of the following:    Fever over 100.4 F (38. C) with worsening joint pain    Increasing redness around the joint    Pain developing in another joint    Repeated vomiting, abdominal pain, or blood in the vomit or stool (black or red color)  Date Last Reviewed: 3/1/2017    7701-9016 [employer]. [address] [place] [id number]. All rights reserved. This information is not intended as a substitute for professional medical care. Always follow your healthcare professional's instructions.        Gout Diet  Gout is a painful condition caused by an excess of  uric acid, a waste product made by the body. Uric acid forms crystals that collect in the joints. The immune response to these crystals brings on symptoms of joint pain and swelling. This is called a gout attack. Often, medications and diet changes are combined to manage gout. Below are some guidelines for changing your diet to help you manage gout and prevent attacks. Your health care provider will help you determine the best eating plan for you.     Eating to manage gout  Weight loss for those who are overweight may help reduce gout attacks.  Eat less of these foods  Eating too many foods containing purines may raise the levels of uric acid in your body. This raises your risk for a gout attack. Try to limit these foods and drinks:    Alcohol, such as beer and red wine. You may be told to avoid alcohol completely.    Soft drinks that contain sugar or high fructose corn syrup    Certain fish, including anchovies, sardines, fish eggs, and herring    Shellfish    Certain meats, such as red meat, hot dogs, luncheon meats, and turkey    Organ meats, such as liver, kidneys, and sweetbreads    Legumes, such as dried beans and peas    Other high fat foods such as gravy, whole milk, and high fat cheeses    Vegetables such as asparagus, cauliflower, spinach, and mushrooms used to be thought to contribute to an increased risk for a gout attack, but recent studies show that high purine vegetables don't increase the risk for a gout attack.  Eat more of these foods  Other foods may be helpful for people with gout. Add some of these foods to your diet:    Cherries contain chemicals that may lower uric acid.    Omega fatty acids. These are found in some fatty fish such as salmon, certain oils (flax, olive, or nut), and nuts themselves. Omega fatty acids may help prevent inflammation due to gout.    Dairy products that are low-fat or fat-free, such as cheese and yogurt    Complex carbohydrate foods, including whole grains, brown  rice, oats, and beans    Coffee, in moderation    Water, approximately 64 ounces per day  Follow-up care  Follow up with your healthcare provider as advised.  When to seek medical advice  Call your healthcare provider right away if any of these occur:    Return of gout symptoms, usually at night:    Severe pain, swelling, and heat in a joint, especially the base of the big toe    Affected joint is hard to move    Skin of the affected joint is purple or red    Fever of 100.4 F (38 C) or higher    Pain that doesn't get better even with prescribed medicine   Date Last Reviewed: 1/12/2016 2000-2017 [employer]. [address] [place] [id number]. All rights reserved. This information is not intended as a substitute for professional medical care. Always follow your healthcare professional's instructions.            CHARO Valencia [facility]

## 2018-05-29 NOTE — PATIENT INSTRUCTIONS
Gout    Gout is an inflammation of a joint due to a build-up of gout crystals in the joint fluid. This occurs when there is an excess of uric acid (a normal waste product) in the body. Uric acid builds up in the body when the kidneys are unable to filter enough of it from the blood. This may occur with age. It is also associated with kidney disease. Gout occurs more often in people with obesity, diabetes, high blood pressure, or high levels of fats in the blood. It may run in families. Gout tends to come and go. A flare up of gout is called an attack. Drinking alcohol or eating certain foods (such as shellfish or foods with additives such as high-fructose corn syrup) may increase uric acid levels in the blood and cause a gout attack.  During a gout attack, the affected joint may become a hot, red, swollen and painful. If you have had one attack of gout, you are likely to have another. An attack of gout can be treated with medicine. If these attacks become frequent, a daily medicine may be prescribed to help the kidneys remove uric acid from the body.  Home care  During a gout attack:    Rest painful joints. If gout affects the joints of your foot or leg, you may want to use crutches for the first few days to keep from bearing weight on the affected joint.    When sitting or lying down, raise the painful joint to a level higher than your heart.    Apply an ice pack (ice cubes in a plastic bag wrapped in a thin towel) over the injured area for 20 minutes every 1 to 2 hours the first day for pain relief. Continue this 3 to 4 times a day for swelling and pain.    Avoid alcohol and foods listed below (see Preventing attacks) during a gout attack. Drink extra fluid to help flush the uric acid through your kidneys.    If you were prescribed a medicine to treat gout, take it as your healthcare provider has instructed. Don't skip doses.    Take anti-inflammatory medicine as directed.     If pain medicines have been  prescribed, take them exactly as directed.    Preventing attacks    Minimize or avoid alcohol use. Excess alcohol intake can cause a gout attack.    Limit these foods and beverages:  ? Organ meats, such as kidneys and liver  ? Certain seafoods (anchovies, sardines, shrimp, scallops, herring, mackerel)  ? Wild game, meat extracts and meat gravies  ? Foods and beverages sweetened with high-fructose corn syrup, such as sodas    Eat a healthy diet including low-fat and nonfat dairy, whole grains, and vegetables.    If you are overweight, talk to your healthcare provider about a weight reduction plan. Avoid fasting or extreme low calorie diets (less than 900 calories per day). This will increase uric acid levels in the body.    If you have diabetes or high blood pressure, work with your doctor to manage these conditions.    Protect the joint from injury. Trauma can trigger a gout attack.  Follow-up care  Follow up with your healthcare provider, or as advised.   When to seek medical advice  Call your healthcare provider if you have any of the following:    Fever over 100.4 F (38. C) with worsening joint pain    Increasing redness around the joint    Pain developing in another joint    Repeated vomiting, abdominal pain, or blood in the vomit or stool (black or red color)  Date Last Reviewed: 3/1/2017    1620-7366 [employer]. [address] [place] [address]. All rights reserved. This information is not intended as a substitute for professional medical care. Always follow your healthcare professional's instructions.        Gout Diet  Gout is a painful condition caused by an excess of uric acid, a waste product made by the body. Uric acid forms crystals that collect in the joints. The immune response to these crystals brings on symptoms of joint pain and swelling. This is called a gout attack. Often, medications and diet changes are combined to manage gout. Below are some guidelines for changing your  diet to help you manage gout and prevent attacks. Your health care provider will help you determine the best eating plan for you.     Eating to manage gout  Weight loss for those who are overweight may help reduce gout attacks.  Eat less of these foods  Eating too many foods containing purines may raise the levels of uric acid in your body. This raises your risk for a gout attack. Try to limit these foods and drinks:    Alcohol, such as beer and red wine. You may be told to avoid alcohol completely.    Soft drinks that contain sugar or high fructose corn syrup    Certain fish, including anchovies, sardines, fish eggs, and herring    Shellfish    Certain meats, such as red meat, hot dogs, luncheon meats, and turkey    Organ meats, such as liver, kidneys, and sweetbreads    Legumes, such as dried beans and peas    Other high fat foods such as gravy, whole milk, and high fat cheeses    Vegetables such as asparagus, cauliflower, spinach, and mushrooms used to be thought to contribute to an increased risk for a gout attack, but recent studies show that high purine vegetables don't increase the risk for a gout attack.  Eat more of these foods  Other foods may be helpful for people with gout. Add some of these foods to your diet:    Cherries contain chemicals that may lower uric acid.    Omega fatty acids. These are found in some fatty fish such as salmon, certain oils (flax, olive, or nut), and nuts themselves. Omega fatty acids may help prevent inflammation due to gout.    Dairy products that are low-fat or fat-free, such as cheese and yogurt    Complex carbohydrate foods, including whole grains, brown rice, oats, and beans    Coffee, in moderation    Water, approximately 64 ounces per day  Follow-up care  Follow up with your healthcare provider as advised.  When to seek medical advice  Call your healthcare provider right away if any of these occur:    Return of gout symptoms, usually at night:    Severe pain, swelling,  and heat in a joint, especially the base of the big toe    Affected joint is hard to move    Skin of the affected joint is purple or red    Fever of 100.4 F (38 C) or higher    Pain that doesn't get better even with prescribed medicine   Date Last Reviewed: 1/12/2016 2000-2017 [employer]. [address] [place], [place] [id number]. All rights reserved. This information is not intended as a substitute for professional medical care. Always follow your healthcare professional's instructions.

## 2018-05-29 NOTE — MR AVS SNAPSHOT
After Visit Summary   5/29/2018    Gerry Palafox    MRN: [id number]           Patient Information     Date Of Birth          1975        Visit Information        Provider Department      5/29/2018 3:00 PM Anali Cedeño APRN [facility]        Today's Diagnoses     Great toe pain, right        Great toe pain, left          Care Instructions      Gout    Gout is an inflammation of a joint due to a build-up of gout crystals in the joint fluid. This occurs when there is an excess of uric acid (a normal waste product) in the body. Uric acid builds up in the body when the kidneys are unable to filter enough of it from the blood. This may occur with age. It is also associated with kidney disease. Gout occurs more often in people with obesity, diabetes, high blood pressure, or high levels of fats in the blood. It may run in families. Gout tends to come and go. A flare up of gout is called an attack. Drinking alcohol or eating certain foods (such as shellfish or foods with additives such as high-fructose corn syrup) may increase uric acid levels in the blood and cause a gout attack.  During a gout attack, the affected joint may become a hot, red, swollen and painful. If you have had one attack of gout, you are likely to have another. An attack of gout can be treated with medicine. If these attacks become frequent, a daily medicine may be prescribed to help the kidneys remove uric acid from the body.  Home care  During a gout attack:    Rest painful joints. If gout affects the joints of your foot or leg, you may want to use crutches for the first few days to keep from bearing weight on the affected joint.    When sitting or lying down, raise the painful joint to a level higher than your heart.    Apply an ice pack (ice cubes in a plastic bag wrapped in a thin towel) over the injured area for 20 minutes every 1 to 2 hours the first day for pain relief. Continue this 3 to 4 times a day  for swelling and pain.    Avoid alcohol and foods listed below (see Preventing attacks) during a gout attack. Drink extra fluid to help flush the uric acid through your kidneys.    If you were prescribed a medicine to treat gout, take it as your healthcare provider has instructed. Don't skip doses.    Take anti-inflammatory medicine as directed.     If pain medicines have been prescribed, take them exactly as directed.    Preventing attacks    Minimize or avoid alcohol use. Excess alcohol intake can cause a gout attack.    Limit these foods and beverages:  ? Organ meats, such as kidneys and liver  ? Certain seafoods (anchovies, sardines, shrimp, scallops, herring, mackerel)  ? Wild game, meat extracts and meat gravies  ? Foods and beverages sweetened with high-fructose corn syrup, such as sodas    Eat a healthy diet including low-fat and nonfat dairy, whole grains, and vegetables.    If you are overweight, talk to your healthcare provider about a weight reduction plan. Avoid fasting or extreme low calorie diets (less than 900 calories per day). This will increase uric acid levels in the body.    If you have diabetes or high blood pressure, work with your doctor to manage these conditions.    Protect the joint from injury. Trauma can trigger a gout attack.  Follow-up care  Follow up with your healthcare provider, or as advised.   When to seek medical advice  Call your healthcare provider if you have any of the following:    Fever over 100.4 F (38. C) with worsening joint pain    Increasing redness around the joint    Pain developing in another joint    Repeated vomiting, abdominal pain, or blood in the vomit or stool (black or red color)  Date Last Reviewed: 3/1/2017    5283-7995 [employer]. [address] [place] [address], [place] [address]. All rights reserved. This information is not intended as a substitute for professional medical care. Always follow your healthcare professional's instructions.        Gout  Diet  Gout is a painful condition caused by an excess of uric acid, a waste product made by the body. Uric acid forms crystals that collect in the joints. The immune response to these crystals brings on symptoms of joint pain and swelling. This is called a gout attack. Often, medications and diet changes are combined to manage gout. Below are some guidelines for changing your diet to help you manage gout and prevent attacks. Your health care provider will help you determine the best eating plan for you.     Eating to manage gout  Weight loss for those who are overweight may help reduce gout attacks.  Eat less of these foods  Eating too many foods containing purines may raise the levels of uric acid in your body. This raises your risk for a gout attack. Try to limit these foods and drinks:    Alcohol, such as beer and red wine. You may be told to avoid alcohol completely.    Soft drinks that contain sugar or high fructose corn syrup    Certain fish, including anchovies, sardines, fish eggs, and herring    Shellfish    Certain meats, such as red meat, hot dogs, luncheon meats, and turkey    Organ meats, such as liver, kidneys, and sweetbreads    Legumes, such as dried beans and peas    Other high fat foods such as gravy, whole milk, and high fat cheeses    Vegetables such as asparagus, cauliflower, spinach, and mushrooms used to be thought to contribute to an increased risk for a gout attack, but recent studies show that high purine vegetables don't increase the risk for a gout attack.  Eat more of these foods  Other foods may be helpful for people with gout. Add some of these foods to your diet:    Cherries contain chemicals that may lower uric acid.    Omega fatty acids. These are found in some fatty fish such as salmon, certain oils (flax, olive, or nut), and nuts themselves. Omega fatty acids may help prevent inflammation due to gout.    Dairy products that are low-fat or fat-free, such as cheese and  yogurt    Complex carbohydrate foods, including whole grains, brown rice, oats, and beans    Coffee, in moderation    Water, approximately 64 ounces per day  Follow-up care  Follow up with your healthcare provider as advised.  When to seek medical advice  Call your healthcare provider right away if any of these occur:    Return of gout symptoms, usually at night:    Severe pain, swelling, and heat in a joint, especially the base of the big toe    Affected joint is hard to move    Skin of the affected joint is purple or red    Fever of 100.4 F (38 C) or higher    Pain that doesn't get better even with prescribed medicine   Date Last Reviewed: 1/12/2016 2000-2017 [employer]. [address] [place] [address], [place] [address]. All rights reserved. This information is not intended as a substitute for professional medical care. Always follow your healthcare professional's instructions.                Follow-ups after your visit        Who to contact     If you have questions or need follow up information about today's clinic visit or your schedule please contact [facility] directly at [phone].  Normal or non-critical lab and imaging results will be communicated to you by [employer], letter or phone within 4 business days after the clinic has received the results. If you do not hear from us within 7 days, please contact the clinic through [facility] or phone. If you have a critical or abnormal lab result, we will notify you by phone as soon as possible.  Submit refill requests through [employer] or call your pharmacy and they will forward the refill request to us. Please allow 3 business days for your refill to be completed.          Additional Information About Your Visit        [employer] Information     [employer] gives you secure access to your electronic health record. If you see a primary care provider, you can also send messages to your care team and make appointments. If you have questions, please  call your primary care clinic.  If you do not have a primary care provider, please call [phone] and they will assist you.        Care EveryWhere ID     This is your Care EveryWhere ID. This could be used by other organizations to access your [facility] medical records  [id number]        Your Vitals Were     Pulse Temperature Respirations Pulse Oximetry BMI (Body Mass Index)       79 98.7  F (37.1  C) (Oral) 16 97% 36.05 kg/m2        Blood Pressure from Last 3 Encounters:   05/29/18 120/78   05/27/18 114/62   05/24/18 122/72    Weight from Last 3 Encounters:   05/29/18 210 lb (95.3 kg)   05/27/18 214 lb (97.1 kg)   05/24/18 214 lb (97.1 kg)              We Performed the Following     CRP inflammation     Erythrocyte sedimentation rate auto     Uric acid        Primary Care Provider Office Phone # Fax #    Pooja Jane Rodriguez, APRN [facility] [phone]       [address] [facility] [address]  [facility] [address]        Equal Access to Services     [facility]: Hadii aad ku hadasho Soomaali, waaxda luqadaha, qaybta kaalmada adeegyada, waxay idiin haybharti griffin . So [facility] [phone].    ATENCIÓN: Si habla español, tiene a gonzalez disposición servicios gratuitos de asistencia lingüística. Llame al [phone].    We comply with applicable federal civil rights laws and [place] laws. We do not discriminate on the basis of race, color, national origin, age, disability, sex, sexual orientation, or gender identity.            Thank you!     Thank you for choosing [facility]  for your care. Our goal is always to provide you with excellent care. Hearing back from our patients is one way we can continue to improve our services. Please take a few minutes to complete the written survey that you may receive in the mail after your visit with us. Thank you!             Your Updated Medication List - Protect others around you: Learn how to safely use, store and throw away your medicines at  www.disposemymeds.org.          This list is accurate as of 5/29/18  3:21 PM.  Always use your most recent med list.                   Brand Name Dispense Instructions for use Diagnosis    * albuterol 108 (90 Base) MCG/ACT Inhaler    PROAIR HFA/PROVENTIL HFA/VENTOLIN HFA    3 Inhaler    Inhale 2 puffs into the lungs every 6 hours as needed for shortness of breath / dyspnea or wheezing    Mild intermittent asthma, uncomplicated       * albuterol (2.5 MG/3ML) 0.083% neb solution     30 vial    Take 1 vial (2.5 mg) by nebulization every 6 hours as needed for shortness of breath / dyspnea or wheezing    Asthma flare, Mild persistent asthma without complication       GLUCOSAMINE SULFATE PO           HYDROcodone-acetaminophen 5-325 MG per tablet    NORCO    20 tablet    Take 1-2 tablets by mouth every 4 hours as needed for other (Moderate to Severe Pain)    Umbilical hernia without obstruction and without gangrene       IBUPROFEN PO      Take 400 mg by mouth 2 times daily as needed for moderate pain        loratadine 10 MG tablet    CLARITIN     Take 10 mg by mouth daily        montelukast 10 MG tablet    SINGULAIR    90 tablet    TAKE 1 TABLET (10 MG) BY MOUTH AT BEDTIME    Moderate asthma       order for DME     1 Device    Equipment being ordered: Nebulizer    Asthma flare, Mild persistent asthma       senna-docusate 8.6-50 MG per tablet    SENOKOT-S;PERICOLACE    30 tablet    Take 1-2 tablets by mouth 2 times daily Take while on oral narcotics to prevent or treat constipation.    Umbilical hernia without obstruction and without gangrene       * Notice:  This list has 2 medication(s) that are the same as other medications prescribed for you. Read the directions carefully, and ask your doctor or other care provider to review them with you.

## 2018-05-29 NOTE — PROGRESS NOTES
RN Post-Op/Post-Discharge Care Coordination Note    Mr. Gerry Palafox is a 42 year old male who underwent open umbilical hernia repair with mesh on 5/24 with  Dr. Torsten Morelos.  Spoke with Patient.    Support  Patient able to care for self independently     Health Status  Fevers/chills: Patient denies any fever or chills.  Nausea/Vomiting: Patient denies nausea/vomiting.  Eating/drinking: Patient is able to eat and drink without any complaints.  Bowel habits: Patient reports having a normal bowel movement.  Drains (LOLY): N/A  Incisions: Patient denies any signs and symptoms of infection..  Wound closure:  Steri-strips   Pain: Improved.  Stopped Norco on POD#2.  Not taking anything for pain.  States was seen in Ortho Clinic with foot pain- may be gout and plans to follow with PCP today.  New Medications:  Norco, Senna    Activity/Restrictions  No lifting in excess of 15-20 pounds for 3-4 weeks    Equipment  None    Pathology reviewed with patient:  N/A    All of his questions were answered including reviewing restrictions and wound care.  He will call this office if he has any further questions and/or concerns.      In lieu of a post-op clinic patient that patient would like to be contacted in approximately 10 days via telephone.    Whom and When to Call  Patient acknowledges understanding of how to manage any medication changes and   when to seek medical care.     Patient advised that if after hour medical concerns arise to please call [phone] and choose option 4 to speak to the physician on call.     A copy of this note was routed to the primary surgeon.

## 2018-05-30 LAB — URATE SERPL-MCNC: 7.7 MG/DL (ref 3.5–7.2)

## 2018-06-01 ENCOUNTER — TELEPHONE (OUTPATIENT)
Dept: ORTHOPEDICS | Facility: CLINIC | Age: 43
End: 2018-06-01

## 2018-06-01 NOTE — TELEPHONE ENCOUNTER
Gerry was called regarding serum laboratory results.  Fortunately was not present and a voicemail was left for him.  I discussed that he has elevations although mild in his uric acid, ESR and CRP are mildly elevated as well.  This is in all likelihood a gout flare given his clinical picture.  I discussed having him follow-up with his primary doctor to discuss trying gout prophylaxis versus symptomatic treatment as needed.  Given given recent multiple flares likely prophylaxis would be warranted.  He was instructed to contact me via my chart with any further questions at this time.    Oscar Barry, DO CAQSM  Primary Care Sports Medicine

## 2018-06-08 ENCOUNTER — CARE COORDINATION (OUTPATIENT)
Dept: SURGERY | Facility: CLINIC | Age: 43
End: 2018-06-08

## 2018-06-08 NOTE — PROGRESS NOTES
Gerry Palafox is a patient of Dr. Torsten Morelos that recently underwent a surgical procedure (open umbilical hernia repair).  The patient was contacted via telephone approximately 10 days ago for a status update and post-op teaching.  In lieu of a clinic visit, that patient requested to be contacted at a later date by an RN for an assessment.    Attempted to contact patient via telephone for a status update.  LM on VM to call office.

## 2018-06-11 NOTE — PROGRESS NOTES
Gerry Palafox was contacted several days post procedure via telephone for a status update and elected to have a telephone follow -up call approximately 10 days after original contact in lieu of a clinic visit with Dr. Torsten Morelos.  He continues to do well and the steri-strips have peeled off.  The patients wounds are healed and the area is C/D/I.  He is afebrile, pain free, and carmela po; the patient is slowly resuming his normal activity and adhering to lifting restrictions.       Pathology was reviewed with the patient: N/A    All of Gerry Palafox question's were answered and  he would like to return to the clinic on a PRN basis.      A copy of this note was routed to his surgeon.

## 2018-11-19 ENCOUNTER — RADIANT APPOINTMENT (OUTPATIENT)
Dept: GENERAL RADIOLOGY | Facility: CLINIC | Age: 43
End: 2018-11-19
Attending: FAMILY MEDICINE
Payer: COMMERCIAL

## 2018-11-19 ENCOUNTER — OFFICE VISIT (OUTPATIENT)
Dept: ORTHOPEDICS | Facility: CLINIC | Age: 43
End: 2018-11-19
Payer: COMMERCIAL

## 2018-11-19 VITALS
WEIGHT: 205 LBS | SYSTOLIC BLOOD PRESSURE: 130 MMHG | BODY MASS INDEX: 32.18 KG/M2 | DIASTOLIC BLOOD PRESSURE: 87 MMHG | HEIGHT: 67 IN

## 2018-11-19 DIAGNOSIS — S20.212A CONTUSION OF RIB ON LEFT SIDE, INITIAL ENCOUNTER: ICD-10-CM

## 2018-11-19 DIAGNOSIS — R07.81 RIB PAIN ON LEFT SIDE: Primary | ICD-10-CM

## 2018-11-19 DIAGNOSIS — R07.81 RIB PAIN ON LEFT SIDE: ICD-10-CM

## 2018-11-19 NOTE — LETTER
"11/19/2018       RE: Gerry Palafox  [address] [place] [address]     Dear Colleague,    Thank you for referring your patient, Gerry Palafox, to the [facility] at [facility]. Please see a copy of my visit note below.     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   Pain with breathing and sleeping.  Bomb goes off, rolled inside of left arm or butt of gun.  Hurt like crazy when he hit the ground.  Final performance and hurt himself.  Can't believe he's hurt, done this so many times.     Done at 5 p.m.  Iced it.  Hard to move around and sleep last evening.  Hurts to deep breathe.  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 He also has no past medical history of Complication of anesthesia; Gastroesophageal reflux disease; or History of blood transfusion.  He  has a past surgical history that includes Herniorrhaphy umbilical (N/A, 5/24/2018).  His family history includes Arthritis in his mother; Cancer in his father.  He reports that he has never smoked. He has never used smokeless tobacco. He reports that he drinks alcohol. He reports that he does not use illicit drugs.    ALLERGIES: He is allergic to penicillin g.    CURRENT MEDICATIONS: He has a current medication list which includes the following prescription(s): UNKNOWN TO PATIENT, albuterol, albuterol, glucosamine sulfate, ibuprofen, loratadine, montelukast, and order for dme.     REVIEW OF SYSTEMS: 10 point review of systems is negative except as noted above.     Exam:   /87  Ht 1.702 m (5' 7\")  Wt 93 kg (205 lb)  BMI 32.11 kg/m2         "   CONSTITUTIONAL: healthy, alert, no distress and cooperative  HEAD: Normocephalic. No masses, lesions, tenderness or abnormalities  SKIN: no suspicious lesions or rashes  GAIT: normal  NEUROLOGIC: Non-focal  PSYCHIATRIC: affect normal/bright and mentation appears normal.    MUSCULOSKELETAL: ribs on left side  Chest- inspection- no swelling, no ecchymosis  Point tender over rib around T4     Assessment/Plan:   Pt is a 42 yo white male with PMHx of hyperlipidemia, TERRIE presenting with left rib pain  1. Left rib pain/contusion- ice, NSAIDs  Modification of activity for a couple weeks  RTC prn  X-RAY INTERPRETATION:   CXR, rib detail- no fractures noted    Again, thank you for allowing me to participate in the care of your patient.      Sincerely,    Kandy Waite MD

## 2018-11-19 NOTE — PROGRESS NOTES
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   Pain with breathing and sleeping.  Bomb goes off, rolled inside of left arm or butt of gun.  Hurt like crazy when he hit the ground.  Final performance and hurt himself.  Can't believe he's hurt, done this so many times.     Done at 5 p.m.  Iced it.  Hard to move around and sleep last evening.  Hurts to deep breathe.  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 He also has no past medical history of Complication of anesthesia; Gastroesophageal reflux disease; or History of blood transfusion.  He  has a past surgical history that includes Herniorrhaphy umbilical (N/A, 5/24/2018).  His family history includes Arthritis in his mother; Cancer in his father.  He reports that he has never smoked. He has never used smokeless tobacco. He reports that he drinks alcohol. He reports that he does not use illicit drugs.    ALLERGIES: He is allergic to penicillin g.    CURRENT MEDICATIONS: He has a current medication list which includes the following prescription(s): UNKNOWN TO PATIENT, albuterol, albuterol, glucosamine sulfate, ibuprofen, loratadine, montelukast, and order for dme.     REVIEW OF SYSTEMS: 10 point review of systems is negative except as noted above.     Exam:   /87  Ht 1.702 m (5' 7\")  Wt 93 kg (205 lb)  BMI 32.11 kg/m2           CONSTITUTIONAL: healthy, alert, no distress and cooperative  HEAD: Normocephalic. No masses, lesions, tenderness or abnormalities  SKIN: no suspicious lesions or rashes  GAIT: normal  NEUROLOGIC: Non-focal  PSYCHIATRIC: affect normal/bright and mentation appears normal.    MUSCULOSKELETAL: ribs on left " side  Chest- inspection- no swelling, no ecchymosis  Point tender over rib around T4     Assessment/Plan:   Pt is a 42 yo white male with PMHx of hyperlipidemia, TERRIE presenting with left rib pain  1. Left rib pain/contusion- ice, NSAIDs  Modification of activity for a couple weeks  RTC prn  X-RAY INTERPRETATION:   CXR, rib detail- no fractures noted

## 2018-11-19 NOTE — MR AVS SNAPSHOT
"              After Visit Summary   11/19/2018    Gerry Palafox    MRN: [id number]           Patient Information     Date Of Birth          1975        Visit Information        Provider Department      11/19/2018 10:10 AM Kandy Waite MD [facility]        Today's Diagnoses     Rib pain on left side    -  1    Contusion of rib on left side, initial encounter           Follow-ups after your visit        Follow-up notes from your care team     Return if symptoms worsen or fail to improve.      Who to contact     Please call your clinic at [phone] to:    Ask questions about your health    Make or cancel appointments    Discuss your medicines    Learn about your test results    Speak to your doctor            Additional Information About Your Visit        [employer] Information     [employer] gives you secure access to your electronic health record. If you see a primary care provider, you can also send messages to your care team and make appointments. If you have questions, please call your primary care clinic.  If you do not have a primary care provider, please call [phone] and they will assist you.      [employer] is an electronic gateway that provides easy, online access to your medical records. With [employer], you can request a clinic appointment, read your test results, renew a prescription or communicate with your care team.     To access your existing account, please contact your [facility] or call [phone] for assistance.        Care EveryWhere ID     This is your Care EveryWhere ID. This could be used by other organizations to access your [place] medical records  [id number]        Your Vitals Were     Height BMI (Body Mass Index)                1.702 m (5' 7\") 32.11 kg/m2           Blood Pressure from Last 3 Encounters:   11/19/18 130/87   05/29/18 120/78   05/27/18 114/62    Weight from Last 3 Encounters:   11/19/18 93 kg " (205 lb)   05/29/18 95.3 kg (210 lb)   05/27/18 97.1 kg (214 lb)                 Today's Medication Changes          These changes are accurate as of 11/19/18 11:06 AM.  If you have any questions, ask your nurse or doctor.               Stop taking these medicines if you haven't already. Please contact your care team if you have questions.     HYDROcodone-acetaminophen 5-325 MG per tablet   Commonly known as:  NORCO   Stopped by:  Kandy Waite MD           senna-docusate 8.6-50 MG per tablet   Commonly known as:  SENOKOT-S;PERICOLACE   Stopped by:  Kandy Waite MD                    Primary Care Provider Office Phone # Fax #    Pooja Lara CHARO Rodriguez [facility] [phone]       [address] [facility] [address]  [facility] [address]        Equal Access to Services     [facility]: Hadii aad ku hadasho Soomaali, waaxda luqadaha, qaybta kaalmada adeegyada, waxay idiin hayaan adelana kharash la'aan . So [facility] [phone].    ATENCIÓN: Si habla español, tiene a gonzalez disposición servicios gratuitos de asistencia lingüística. Saurav al [phone].    We comply with applicable federal civil rights laws and [place] laws. We do not discriminate on the basis of race, color, national origin, age, disability, sex, sexual orientation, or gender identity.            Thank you!     Thank you for choosing [facility]  for your care. Our goal is always to provide you with excellent care. Hearing back from our patients is one way we can continue to improve our services. Please take a few minutes to complete the written survey that you may receive in the mail after your visit with us. Thank you!             Your Updated Medication List - Protect others around you: Learn how to safely use, store and throw away your medicines at www.disposemymeds.org.          This list is accurate as of 11/19/18 11:06 AM.  Always use your most recent med list.                   Brand  Name Dispense Instructions for use Diagnosis    * albuterol 108 (90 Base) MCG/ACT inhaler    PROAIR HFA/PROVENTIL HFA/VENTOLIN HFA    3 Inhaler    Inhale 2 puffs into the lungs every 6 hours as needed for shortness of breath / dyspnea or wheezing    Mild intermittent asthma, uncomplicated       * albuterol (2.5 MG/3ML) 0.083% neb solution     30 vial    Take 1 vial (2.5 mg) by nebulization every 6 hours as needed for shortness of breath / dyspnea or wheezing    Asthma flare, Mild persistent asthma without complication       GLUCOSAMINE SULFATE PO           IBUPROFEN PO      Take 400 mg by mouth 2 times daily as needed for moderate pain        loratadine 10 MG tablet    CLARITIN     Take 10 mg by mouth daily        montelukast 10 MG tablet    SINGULAIR    90 tablet    TAKE 1 TABLET (10 MG) BY MOUTH AT BEDTIME    Moderate asthma       order for DME     1 Device    Equipment being ordered: Nebulizer    Asthma flare, Mild persistent asthma       UNKNOWN TO PATIENT      For gout, cherry supplement and celery seed.        * Notice:  This list has 2 medication(s) that are the same as other medications prescribed for you. Read the directions carefully, and ask your doctor or other care provider to review them with you.

## 2018-12-19 ENCOUNTER — OFFICE VISIT (OUTPATIENT)
Dept: ORTHOPEDICS | Facility: CLINIC | Age: 43
End: 2018-12-19
Payer: COMMERCIAL

## 2018-12-19 ENCOUNTER — ANCILLARY PROCEDURE (OUTPATIENT)
Dept: GENERAL RADIOLOGY | Facility: CLINIC | Age: 43
End: 2018-12-19
Attending: FAMILY MEDICINE
Payer: COMMERCIAL

## 2018-12-19 VITALS — WEIGHT: 205 LBS | HEIGHT: 67 IN | RESPIRATION RATE: 16 BRPM | BODY MASS INDEX: 32.18 KG/M2

## 2018-12-19 DIAGNOSIS — M25.462 EFFUSION OF LEFT KNEE JOINT: ICD-10-CM

## 2018-12-19 DIAGNOSIS — M25.562 ACUTE PAIN OF LEFT KNEE: Primary | ICD-10-CM

## 2018-12-19 LAB
APPEARANCE FLD: NORMAL
COLOR FLD: COLORLESS
CRYSTALS SNV MICRO: NORMAL
GRAM STN SPEC: NORMAL
LYMPHOCYTES NFR FLD MANUAL: 1 %
MONOS+MACROS NFR FLD MANUAL: 11 %
NEUTS BAND NFR FLD MANUAL: 88 %
RBC # FLD: NORMAL /UL
SPECIMEN SOURCE FLD: NORMAL
SPECIMEN SOURCE: NORMAL
WBC # FLD AUTO: 3015 /UL

## 2018-12-19 RX ORDER — LIDOCAINE HYDROCHLORIDE 10 MG/ML
3 INJECTION, SOLUTION INFILTRATION; PERINEURAL
Status: SHIPPED | OUTPATIENT
Start: 2018-12-19

## 2018-12-19 RX ORDER — TRAMADOL HYDROCHLORIDE 50 MG/1
50 TABLET ORAL EVERY 6 HOURS PRN
Qty: 14 TABLET | Refills: 0 | Status: SHIPPED | OUTPATIENT
Start: 2018-12-19 | End: 2018-12-22

## 2018-12-19 RX ADMIN — LIDOCAINE HYDROCHLORIDE 3 ML: 10 INJECTION, SOLUTION INFILTRATION; PERINEURAL at 10:04

## 2018-12-19 ASSESSMENT — MIFFLIN-ST. JEOR: SCORE: 1793.5

## 2018-12-19 NOTE — NURSING NOTE
[address] [address]  Dept: [phone]  ______________________________________________________________________________    Patient: Gerry Palafox   : 1975   MRN: [id number]   2018    INVASIVE PROCEDURE SAFETY CHECKLIST    Date: 18   Procedure:Left knee aspiration  Patient Name: Gerry Palafox  MRN: [id number]  YOB: 1975    Action: Complete sections as appropriate. Any discrepancy results in a HARD COPY until resolved.     PRE PROCEDURE:  Patient ID verified with 2 identifiers (name and  or MRN): Yes  Procedure and site verified with patient/designee (when able): Yes  Accurate consent documentation in medical record: Yes  H&P (or appropriate assessment) documented in medical record: Yes  H&P must be up to 20 days prior to procedure and updates within 24 hours of procedure as applicable: No  Relevant diagnostic and radiology test results appropriately labeled and displayed as applicable: Yes  Procedure site(s) marked with provider initials: Yes    TIMEOUT:  Time-Out performed immediately prior to starting procedure, including verbal and active participation of all team members addressing the following:Yes  * Correct patient identify  * Confirmed that the correct side and site are marked  * An accurate procedure consent form  * Agreement on the procedure to be done  * Correct patient position  * Relevant images and results are properly labeled and appropriately displayed  * The need to administer antibiotics or fluids for irrigation purposes during the procedure as applicable   * Safety precautions based on patient history or medication use    DURING PROCEDURE: Verification of correct person, site, and procedures any time the responsibility for care of the patient is transferred to another member of the care team.     The following medication was given:     MEDICATION:  Lidocaine without epinephrine  ROUTE:  intra-articular  SITE: left Knee  DOSE: 3 mL  LOT #: -[id number]  : Hospira  EXPIRATION DATE: 1OCT2020  NDC#: 4393-2001-92   Was there drug waste? Yes  Amount of drug waste (mL): 17.  Reason for waste:  Single use vial    Jodi Johnson, JEFFREY  December 19, 2018

## 2018-12-19 NOTE — LETTER
12/19/2018       RE: Gerry Palafox  [address] [place] [address]     Dear Colleague,    Thank you for referring your patient, Gerry Palafox, to the [facility] SP[facility] ORTHOPAED[facility] WALK IN CLINIC at [facility]. Please see a copy of my visit note below.          SPORTS & ORTHOPEDIC WALK-IN VISIT 12/19/2018    Primary Care Physician: Dr. 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 Had some posterior knee pain. Got a brace, went away, started wearing knee pads. Had been fine but 12/16 in the morning it started coming back. Same amount of pain on 12/15. Last night it got worse and he started limping. Had trouble sleeping. Weight bearing is painful. Elevation is painful. Icing and heating weren't helping. Twisting is painful. Pain is still posterior as well as anterior lateral. Noticed a little pocket of swelling. 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     Reason for visit:     What part of your body is injured / painful?  left knee    What caused the injury /pain? Unsure    How long ago did your injury occur or pain begin? several days ago    What are your most bothersome symptoms? Pain, Swelling and Clicking, popping    How would you characterize your symptom?  Sharp, throbbing when elevated    What makes your symptoms better? Nothing - has tried ice, heat, aleve, elevation    What makes your symptoms worse? Standing and Walking    Have you been previously seen for this problem? No    Medical History:    Any recent changes to your medical history? No    Any new medication prescribed since last visit? No    Have you had surgery on this body part before? No         Large Joint Injection/Arthocentesis: L knee joint  Date/Time: 12/19/2018 10:04 AM  Performed by: Oscar Barry DO  Authorized by: Oscar Barry DO     Indications:  Joint swelling  Needle Size:  20 G  Needle Size comment:  Pt was injected with lidocaine using 27 G needle  and then aspirated using 20 G needle    Guidance: ultrasound    Location:  Knee  Site:  L knee joint  Medications:  3 mL lidocaine 1 %  Aspirate analysis: sent for lab analysis    Outcome:  Tolerated well, no immediate complications  Procedure discussed: discussed risks, benefits, and alternatives    Consent Given by:  Patient  Timeout: timeout called immediately prior to procedure    Prep: patient was prepped and draped in usual sterile fashion              [facility]  Orthopedics  Oscar Barry, DO  2018     Name: Gerry Palafox  MRN: [id number]  Age: 43 year old  : 1975  Referring provider: Referred Self     Chief Complaint: Pain of the Left Knee     History of Present Illness:   Gerry Palafox is a 43 year old male who presents today for evaluation of left knee pain. 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 His pain initially presented as posterior knee pain without acute injury or trauma. He was able to relieve his pain with a brace and knee pads. However, his pain has recently returned without acute event. Worked Saturday uneventfully, mild pain on .  Able to ambulate until last night.  Throbbing and increased pain overnight. Cannot bear weight or bend knee today.  Increased swelling.    Today, he locates his pain on both the anterior lateral and posterior aspect of his left knee and notes that it is much more severe than previously. His pain is exacerbated with straightening, twisting, and weight bearing. He also had difficulty sleeping last night because of the pain. He has also noticed increased swelling diffusely around the left knee joint. He has attempted to alleviate his pain with ice and heat, but this did not help. 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       Review of Systems:   A 10-point review of systems was obtained and is negative except for as noted in the HPI.     Medications:     Current Outpatient  "Medications:      albuterol (2.5 MG/3ML) 0.083% neb solution, Take 1 vial (2.5 mg) by nebulization every 6 hours as needed for shortness of breath / dyspnea or wheezing, Disp: 30 vial, Rfl: 0     albuterol (PROAIR HFA/PROVENTIL HFA/VENTOLIN HFA) 108 (90 BASE) MCG/ACT Inhaler, Inhale 2 puffs into the lungs every 6 hours as needed for shortness of breath / dyspnea or wheezing, Disp: 3 Inhaler, Rfl: 3     GLUCOSAMINE SULFATE PO, , Disp: , Rfl:      IBUPROFEN PO, Take 400 mg by mouth 2 times daily as needed for moderate pain, Disp: , Rfl:      loratadine (CLARITIN) 10 MG tablet, Take 10 mg by mouth daily, Disp: , Rfl:      montelukast (SINGULAIR) 10 MG tablet, TAKE 1 TABLET (10 MG) BY MOUTH AT BEDTIME, Disp: 90 tablet, Rfl: 1     ORDER FOR DME, Equipment being ordered: Nebulizer, Disp: 1 Device, Rfl: 0     UNKNOWN TO PATIENT, For gout, cherry supplement and celery seed., Disp: , Rfl:     Allergies:  Penicillin G    Past Medical History:  Mild persistent asthma  Sleep apnea    Past Surgical History:  Herniorrhaphy umbilical    Social History:  Works as a  and in a tire shop  Occasional alcohol use  Nonsmoker    Family History:  Mother: arthritis  Father: cancer    Physical Examination:  Resp. rate 16, height 1.702 m (5' 7\"), weight 93 kg (205 lb).   General  - normal appearance, in no obvious distress  HEENT  -Pupils equal, round, no conjunctival injection.  No lid lag  CV  - normal popliteal pulse  Pulm  - normal respiratory pattern, non-labored  Musculoskeletal - left knee  - stance: no obvious leg length discrepancy  - inspection: Mild to moderate joint effusion, normal bone and joint alignment, no obvious deformity  - palpation: Tender at lateral joint line, Non-tender medially, Tender at patellar tendon. +Warmth mild  - ROM: Limited flexion and extension due to pain.  Comfortable at 30 degrees. Passive ROM 15 to 60.    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  - varus and valgus testing  Neuro  - no sensory or motor deficit, grossly normal coordination, normal muscle tone  Skin  - no ecchymosis, erythema, warmth, or induration, no obvious rash  Psych  - interactive, appropriate, normal mood and affect    Imaging:   Radiographs of the left knee - 3 views (12/19/2018)  No acute osseous abnormalities.  Mild medial Joint space narrowing.    I have independently reviewed the above imaging studies; the results were discussed with the patient.     Assessment:   43 year old male history suggestive of gout presenting with severe left knee pain without acute inciting event. Although clinically suspicious for gout, he did not have significant cloudiness with joint aspirate. We will send fluid for labs and pursue MRI for possible meniscal injury vs. Cartilage injury in setting of early OA. Also remaining in differential is inflammatory arthritis, given previous joint pains (of feet) - elevate esr, crp.     Plan:   - We will proceed with aspiration of fluid will be sent for analysis  - Pain control withTramadol, 50mg for pain and naproxen  - Continue wearing knee brace and ambulating with crutches.   - MRI scheduled. Follow-up after results.    Knee Aspiration - Ultrasound Guided  The patient was informed of the risks and the benefits of the procedure and a written consent was signed.  The patient s right knee was prepped with chlorhexidine in sterile fashion.   Procedure was performed using sterile technique.  Local anesthesia was performed using a 27-gauge 1.5-inch needle to administer 3 mL of 1% lidocaine without epi.  Under ultrasound guidance a 1.5-inch 20-gauge needle on a 30 cc syringe was used to enter the superolateral aspect of the right knee.  Needle placement was visualized and documented with ultrasound.  Ultrasound visualization was necessary due to ensure proper placement of needle in to the effusion, as well  as resolution of effusion once aspiration was completed.  20 cc of mildly cloudy yellow joint fluid was aspirated.  Aspiration was performed successfully, without difficulty.  Images were permanently stored for the patient's record.  There were no complications. The patient tolerated the procedure well. There was negligible bleeding.   The patient was instructed to ice the knee upon leaving clinic and refrain from overuse over the next 3 days.   The patient was instructed to call or go to the emergency room with any unusual pain, swelling, redness, or if otherwise concerned.  I, Oscar Barry DO, have reviewed the above note and agree with the scribe's notation as written.    Scribe Disclosure:   I, Duglas Shipman, am serving as a scribe to document services personally performed by Oscar Barry DO at this visit, based upon the provider's statements to me. All documentation has been reviewed by the aforementioned provider prior to being entered into the official medical record.      Again, thank you for allowing me to participate in the care of your patient.      Sincerely,    Oscar Barry DO

## 2018-12-19 NOTE — PROGRESS NOTES
[facility]  Orthopedics  Oscar Barry, DO  2018     Name: Gerry Palafox  MRN: [id number]  Age: 43 year old  : 1975  Referring provider: Referred Self     Chief Complaint: Pain of the Left Knee     History of Present Illness:   Gerry Palafox is a 43 year old male who presents today for evaluation of left knee pain. 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 His pain initially presented as posterior knee pain without acute injury or trauma. He was able to relieve his pain with a brace and knee pads. However, his pain has recently returned without acute event. Worked Saturday uneventfully, mild pain on .  Able to ambulate until last night.  Throbbing and increased pain overnight. Cannot bear weight or bend knee today.  Increased swelling.    Today, he locates his pain on both the anterior lateral and posterior aspect of his left knee and notes that it is much more severe than previously. His pain is exacerbated with straightening, twisting, and weight bearing. He also had difficulty sleeping last night because of the pain. He has also noticed increased swelling diffusely around the left knee joint. He has attempted to alleviate his pain with ice and heat, but this did not help. 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       Review of Systems:   A 10-point review of systems was obtained and is negative except for as noted in the HPI.     Medications:     Current Outpatient Medications:      albuterol (2.5 MG/3ML) 0.083% neb solution, Take 1 vial (2.5 mg) by nebulization every 6 hours as needed for shortness of breath / dyspnea or wheezing, Disp: 30 vial, Rfl: 0     albuterol (PROAIR HFA/PROVENTIL HFA/VENTOLIN HFA) 108 (90 BASE) MCG/ACT Inhaler, Inhale 2 puffs into the lungs every 6 hours as needed for shortness of breath / dyspnea or wheezing, Disp: 3 Inhaler, Rfl: 3     GLUCOSAMINE SULFATE PO, , Disp: , Rfl:      IBUPROFEN PO, Take 400  "mg by mouth 2 times daily as needed for moderate pain, Disp: , Rfl:      loratadine (CLARITIN) 10 MG tablet, Take 10 mg by mouth daily, Disp: , Rfl:      montelukast (SINGULAIR) 10 MG tablet, TAKE 1 TABLET (10 MG) BY MOUTH AT BEDTIME, Disp: 90 tablet, Rfl: 1     ORDER FOR DME, Equipment being ordered: Nebulizer, Disp: 1 Device, Rfl: 0     UNKNOWN TO PATIENT, For gout, cherry supplement and celery seed., Disp: , Rfl:     Allergies:  Penicillin G    Past Medical History:  Mild persistent asthma  Sleep apnea    Past Surgical History:  Herniorrhaphy umbilical    Social History:  Works as a  and in a tire shop  Occasional alcohol use  Nonsmoker    Family History:  Mother: arthritis  Father: cancer    Physical Examination:  Resp. rate 16, height 1.702 m (5' 7\"), weight 93 kg (205 lb).   General  - normal appearance, in no obvious distress  HEENT  -Pupils equal, round, no conjunctival injection.  No lid lag  CV  - normal popliteal pulse  Pulm  - normal respiratory pattern, non-labored  Musculoskeletal - left knee  - stance: no obvious leg length discrepancy  - inspection: Mild to moderate joint effusion, normal bone and joint alignment, no obvious deformity  - palpation: Tender at lateral joint line, Non-tender medially, Tender at patellar tendon. +Warmth mild  - ROM: Limited flexion and extension due to pain.  Comfortable at 30 degrees. Passive ROM 15 to 60.    - strength: 5/5 in flexion, 5/5 in extension  - special tests: deferred due to pain, unable to flex knee adequately to perform lachman, drawer.  - varus and valgus testing  Neuro  - no sensory or motor deficit, grossly normal coordination, normal muscle tone  Skin  - no ecchymosis, erythema, warmth, or induration, no obvious rash  Psych  - interactive, appropriate, normal mood and affect    Imaging:   Radiographs of the left knee - 3 views (12/19/2018)  No acute osseous abnormalities.  Mild medial Joint space narrowing.    I have independently reviewed the " above imaging studies; the results were discussed with the patient.     Assessment:   43 year old male history suggestive of gout presenting with severe left knee pain without acute inciting event. Although clinically suspicious for gout, he did not have significant cloudiness with joint aspirate. We will send fluid for labs and pursue MRI for possible meniscal injury vs. Cartilage injury in setting of early OA. Also remaining in differential is inflammatory arthritis, given previous joint pains (of feet) - elevate esr, crp.     Plan:   - We will proceed with aspiration of fluid will be sent for analysis  - Pain control withTramadol, 50mg for pain and naproxen  - Continue wearing knee brace and ambulating with crutches.   - MRI scheduled. Follow-up after results.    Knee Aspiration - Ultrasound Guided  The patient was informed of the risks and the benefits of the procedure and a written consent was signed.  The patient s right knee was prepped with chlorhexidine in sterile fashion.   Procedure was performed using sterile technique.  Local anesthesia was performed using a 27-gauge 1.5-inch needle to administer 3 mL of 1% lidocaine without epi.  Under ultrasound guidance a 1.5-inch 20-gauge needle on a 30 cc syringe was used to enter the superolateral aspect of the right knee.  Needle placement was visualized and documented with ultrasound.  Ultrasound visualization was necessary due to ensure proper placement of needle in to the effusion, as well as resolution of effusion once aspiration was completed.  20 cc of mildly cloudy yellow joint fluid was aspirated.  Aspiration was performed successfully, without difficulty.  Images were permanently stored for the patient's record.  There were no complications. The patient tolerated the procedure well. There was negligible bleeding.   The patient was instructed to ice the knee upon leaving clinic and refrain from overuse over the next 3 days.   The patient was instructed to  call or go to the emergency room with any unusual pain, swelling, redness, or if otherwise concerned.  I, Oscar Barry DO, have reviewed the above note and agree with the scribe's notation as written.    Scribe Disclosure:   I, Duglas Shipman, am serving as a scribe to document services personally performed by Oscar Barry DO at this visit, based upon the provider's statements to me. All documentation has been reviewed by the aforementioned provider prior to being entered into the official medical record.

## 2018-12-19 NOTE — PROGRESS NOTES
SPORTS & ORTHOPEDIC WALK-IN VISIT 12/19/2018    Primary Care Physician: Dr. 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 Had some posterior knee pain. Got a brace, went away, started wearing knee pads. Had been fine but 12/16 in the morning it started coming back. Same amount of pain on 12/15. Last night it got worse and he started limping. Had trouble sleeping. Weight bearing is painful. Elevation is painful. Icing and heating weren't helping. Twisting is painful. Pain is still posterior as well as anterior lateral. Noticed a little pocket of swelling. 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     Reason for visit:     What part of your body is injured / painful?  left knee    What caused the injury /pain? Unsure    How long ago did your injury occur or pain begin? several days ago    What are your most bothersome symptoms? Pain, Swelling and Clicking, popping    How would you characterize your symptom?  Sharp, throbbing when elevated    What makes your symptoms better? Nothing - has tried ice, heat, aleve, elevation    What makes your symptoms worse? Standing and Walking    Have you been previously seen for this problem? No    Medical History:    Any recent changes to your medical history? No    Any new medication prescribed since last visit? No    Have you had surgery on this body part before? No         Large Joint Injection/Arthocentesis: L knee joint  Date/Time: 12/19/2018 10:04 AM  Performed by: Oscar Barry DO  Authorized by: Oscar Barry DO     Indications:  Joint swelling  Needle Size:  20 G  Needle Size comment:  Pt was injected with lidocaine using 27 G needle and then aspirated using 20 G needle    Guidance: ultrasound    Location:  Knee  Site:  L knee joint  Medications:  3 mL lidocaine 1 %  Aspirate analysis: sent for lab analysis    Outcome:  Tolerated well, no immediate complications  Procedure discussed: discussed risks, benefits, and alternatives     Consent Given by:  Patient  Timeout: timeout called immediately prior to procedure    Prep: patient was prepped and draped in usual sterile fashion

## 2018-12-20 ENCOUNTER — ANCILLARY PROCEDURE (OUTPATIENT)
Dept: MRI IMAGING | Facility: CLINIC | Age: 43
End: 2018-12-20
Attending: FAMILY MEDICINE
Payer: COMMERCIAL

## 2018-12-20 DIAGNOSIS — M25.562 ACUTE PAIN OF LEFT KNEE: ICD-10-CM

## 2018-12-21 ENCOUNTER — TELEPHONE (OUTPATIENT)
Dept: ORTHOPEDICS | Facility: CLINIC | Age: 43
End: 2018-12-21

## 2018-12-21 NOTE — TELEPHONE ENCOUNTER
[facility] Call Center    Phone Message    May a detailed message be left on voicemail: yes    Reason for Call: Other: Pt stated he is still in great deal of pain and cannot work, does not want to wait until 12/29 to go over results. Pt said if Dr. Barry is out he would be fine with any other doctor to give results. Pt also said pain meds given to him have had no affect and would like another kind to be prescribed if possible.     Action Taken: Message routed to:  [facility] ([facility] Ortho

## 2018-12-21 NOTE — TELEPHONE ENCOUNTER
Called pt, he will wait to go over results with Dr. Barry and will go to the ER if he decides he needs pain meds. Work note will be mailed to his address as requested.

## 2018-12-24 LAB
BACTERIA SPEC CULT: NO GROWTH
SPECIMEN SOURCE: NORMAL

## 2018-12-29 ENCOUNTER — OFFICE VISIT (OUTPATIENT)
Dept: ORTHOPEDICS | Facility: CLINIC | Age: 43
End: 2018-12-29
Payer: COMMERCIAL

## 2018-12-29 VITALS — RESPIRATION RATE: 16 BRPM | BODY MASS INDEX: 32.18 KG/M2 | HEIGHT: 67 IN | WEIGHT: 205 LBS

## 2018-12-29 DIAGNOSIS — M25.562 ACUTE PAIN OF LEFT KNEE: Primary | ICD-10-CM

## 2018-12-29 DIAGNOSIS — S89.92XD INJURY OF POSTEROLATERAL CORNER OF KNEE, LEFT, SUBSEQUENT ENCOUNTER: ICD-10-CM

## 2018-12-29 RX ORDER — LIDOCAINE HYDROCHLORIDE 10 MG/ML
4 INJECTION, SOLUTION INFILTRATION; PERINEURAL
Status: SHIPPED | OUTPATIENT
Start: 2018-12-29

## 2018-12-29 RX ORDER — TRIAMCINOLONE ACETONIDE 40 MG/ML
40 INJECTION, SUSPENSION INTRA-ARTICULAR; INTRAMUSCULAR
Status: SHIPPED | OUTPATIENT
Start: 2018-12-29

## 2018-12-29 RX ADMIN — TRIAMCINOLONE ACETONIDE 40 MG: 40 INJECTION, SUSPENSION INTRA-ARTICULAR; INTRAMUSCULAR at 15:04

## 2018-12-29 RX ADMIN — LIDOCAINE HYDROCHLORIDE 4 ML: 10 INJECTION, SOLUTION INFILTRATION; PERINEURAL at 15:04

## 2018-12-29 ASSESSMENT — MIFFLIN-ST. JEOR: SCORE: 1793.5

## 2018-12-29 NOTE — LETTER
12/29/2018       RE: Gerry Palafox  [address] [place] [address]     Dear Colleague,    Thank you for referring your patient, Gerry Palafox, to the [facility] SPORTS AND ORTHOPAEDIC WALK IN CLINIC at [facility]. Please see a copy of my visit note below.          SPORTS & ORTHOPEDIC WALK-IN FOLLOW-UP VISIT 12/29/2018    Interval History:     Follow up reason: MRI results    Date of injury: 12/16/18    Date last seen: 12/19/18    Following Therapeutic Plan: Yes     Pain: Improving    Function: Improving    Interval History: Feeling a lot better, walking without crutches starting Cecile Portillo.  Got brace on the 26th which seems to be helping. Feels more secure. After the MRI swelling went down into ankle and foot. Believes he had a gout flare in his toe which has pretty much subsided. States since MRI his achilles tightens up if he sits for too long, hoping for some suggestions to prevent that.     Medical History:    Any recent changes to your medical history? No    Any new medication prescribed since last visit? No           Large Joint Injection: L knee joint  Date/Time: 12/29/2018 3:04 PM  Performed by: Oscar Barry DO  Authorized by: Oscar Barry DO     Indications:  Pain  Needle Size:  22 G  Guidance: landmark guided    Location:  Knee  Site:  L knee joint  Medications:  40 mg triamcinolone 40 MG/ML; 4 mL lidocaine 1 %  Outcome:  Tolerated well, no immediate complications  Procedure discussed: discussed risks, benefits, and alternatives    Consent Given by:  Patient  Timeout: timeout called immediately prior to procedure    Prep: patient was prepped and draped in usual sterile fashion      ESTABLISHED PATIENT FOLLOW-UP:  Pain of the Left Knee     HISTORY OF PRESENT ILLNESS  Mr. Palafox is a pleasant 43 year old year old male who presents to clinic today for follow-up of left knee pain.     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  Decrease in swelling after last aspiration and use of sleeve and brace.  He purchased a hinged knee brace at a local pharmacy which is provided improved stability of his knee.  He still has deficits of his range of motion and full flexion, however he is able to bend his knee greater than 90 degrees at which she notes an improvement.  He does continue to feel unstable on this knee and his pain remains posteriorly about the knee as well as mild pain anteriorly.  Additionally today he notes pain in his left Achilles tendon region.  No acute injury.  He feels a tight\" sensation which is worse when he sits for too long and improves with some stretching.    Additionally notes swelling of his calf/ankle after we saw him last week.  This has largely resolved.  Pain of left great toe worsened but has also since subsided.    Additional medical/Social/Surgical histories reviewed in [facility] and updated as appropriate.    REVIEW OF SYSTEMS (12/29/2018)  CONSTITUTIONAL: Denies fever and weight loss  GASTROINTESTINAL: Denies abdominal pain, nausea, vomiting  MUSCULOSKELETAL: See HPI  SKIN: Denies any recent rash or lesion  NEUROLOGICAL: Denies numbness or focal weakness     PHYSICAL EXAM  Resp 16   Ht 1.702 m (5' 7\")   Wt 93 kg (205 lb)   BMI 32.11 kg/m       General  - normal appearance, in no obvious distress  HEENT  -Pupils equal, round, no conjunctival injection.  No lid lag  CV  - normal popliteal pulse  Pulm  - normal respiratory pattern, non-labored  Musculoskeletal - left knee  - stance: no obvious leg length discrepancy  - inspection: Mild to moderate joint effusion, normal bone and joint alignment, no obvious deformity  - palpation: Tender at lateral joint line, Non-tender medially, Tenderness at popliteal " fossa  - ROM: Flexion to about 95 degrees, full extension to 0, painful greater than 100 degrees of flexion  - strength: Flexion and extension of the knee is grossly intact  - special tests: Negative Lachman test, negative Lucy test, negative valgus/varus stress testing, negative posterior drawer and anterior drawer testing.  Dial test symmetric compared bilaterally without increased pain.  - varus and valgus testing negative  Neuro  - no sensory or motor deficit, grossly normal coordination, normal muscle tone  Skin  - no ecchymosis, erythema, warmth, or induration, no obvious rash  Psych  - interactive, appropriate, normal mood and affect    IMAGING : MRI left knee without contrast    Impression:  1. Large joint effusion.  2. Small popliteal cyst leaking superiorly, possibly ruptured.  3. Edema signal superficial to popliteus tendon and musculature,  possibly related to underlying posterior lateral capsular injury or  ultra related to popliteus muscle strain.  4. 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  5. Mucoid/cystic degeneration of ACL.     CRISTIANAAISSATOU KEVIN    Labs - Negative crystals, no growth, 3015 WBC, gram stain neg.    ASSESSMENT & PLAN  Mr. Palafox is a 43 year old year old male who presents to clinic today to review MRI of left knee and discuss largely improving pain and debility of left knee.  Labs unremarkable. The lack of mechanism of injury and current presentation remains somewhat elusive. After reviewing MRI it is quite atypical that he has suffered a popliteus/possible posterior lateral capsule injury, posterior oblique ligament of PMC without any acute inciting event.  However he was working in the shop on Saturday before the injury which which he notes being on his knees and other compromising positions at times.  It is also possible that pain mostly related to ruptured popliteal cyst.  We discussed considering physical  therapy as well as corticosteroid injection to control synovitis/effusion and may help aid in pain control from ruptured baker's cyst.     -Cortical steroid injection to left knee  -Continue wearing hinged knee brace, Tubigrip sleeve  -Ibuprofen, Tylenol as needed, de-escalate from tramadol  -Start physical therapy for guided range of motion and strengthening program;   -Follow up 4 weeks    PROCEDURE    Left Knee Injection - Intraarticular  The patient was informed of the risks and the benefits of the procedure and a written consent was signed.  The patient s Left knee was prepped with chlorhexidine in sterile fashion.   40 mg of triamcinolone suspension was drawn up into a 5 mL syringe with 4 mL of 1% lidocaine.  Injection was performed using substerile technique.  A 1.5-inch 22-gauge needle was used to enter the lateral aspect of the left knee.  Injection performed successfully without difficulty.  There were no complications. The patient tolerated the procedure well. There was negligible bleeding.   The patient was instructed to ice the knee upon leaving clinic and refrain from overuse over the next 3 days.   The patient was instructed to call or go to the emergency room with any unusual pain, swelling, redness, or if otherwise concerned.  A follow up appointment will be scheduled to evaluate response to the injection, and to assess range of motion and pain.    It was a pleasure seeing Josh Barry DO, ALFREDITO  Primary Care Sports Medicine

## 2018-12-29 NOTE — PROGRESS NOTES
"ESTABLISHED PATIENT FOLLOW-UP:  Pain of the Left Knee       HISTORY OF PRESENT ILLNESS  Mr. Palafox is a pleasant 43 year old year old male who presents to clinic today for follow-up of left knee pain.     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  Decrease in swelling after last aspiration and use of sleeve and brace.  He purchased a hinged knee brace at a local pharmacy which is provided improved stability of his knee.  He still has deficits of his range of motion and full flexion, however he is able to bend his knee greater than 90 degrees at which she notes an improvement.  He does continue to feel unstable on this knee and his pain remains posteriorly about the knee as well as mild pain anteriorly.  Additionally today he notes pain in his left Achilles tendon region.  No acute injury.  He feels a tight\" sensation which is worse when he sits for too long and improves with some stretching.    Additionally notes swelling of his calf/ankle after we saw him last week.  This has largely resolved.  Pain of left great toe worsened but has also since subsided.    Additional medical/Social/Surgical histories reviewed in [facility] and updated as appropriate.    REVIEW OF SYSTEMS (12/29/2018)  CONSTITUTIONAL: Denies fever and weight loss  GASTROINTESTINAL: Denies abdominal pain, nausea, vomiting  MUSCULOSKELETAL: See HPI  SKIN: Denies any recent rash or lesion  NEUROLOGICAL: Denies numbness or focal weakness     PHYSICAL EXAM  Resp 16   Ht 1.702 m (5' 7\")   Wt 93 kg (205 lb)   BMI 32.11 kg/m      General  - normal appearance, in no obvious distress  HEENT  -Pupils equal, round, no conjunctival injection.  No lid lag  CV  - normal popliteal pulse  Pulm  - normal respiratory " pattern, non-labored  Musculoskeletal - left knee  - stance: no obvious leg length discrepancy  - inspection: Mild to moderate joint effusion, normal bone and joint alignment, no obvious deformity  - palpation: Tender at lateral joint line, Non-tender medially, Tenderness at popliteal fossa  - ROM: Flexion to about 95 degrees, full extension to 0, painful greater than 100 degrees of flexion  - strength: Flexion and extension of the knee is grossly intact  - special tests: Negative Lachman test, negative Lucy test, negative valgus/varus stress testing, negative posterior drawer and anterior drawer testing.  Dial test symmetric compared bilaterally without increased pain.  - varus and valgus testing negative  Neuro  - no sensory or motor deficit, grossly normal coordination, normal muscle tone  Skin  - no ecchymosis, erythema, warmth, or induration, no obvious rash  Psych  - interactive, appropriate, normal mood and affect    IMAGING : MRI left knee without contrast    Impression:  1. Large joint effusion.  2. Small popliteal cyst leaking superiorly, possibly ruptured.  3. Edema signal superficial to popliteus tendon and musculature,  possibly related to underlying posterior lateral capsular injury or  ultra related to popliteus muscle strain.  4. 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  5. Mucoid/cystic degeneration of ACL.     CRISTIANA HERBERTH    Labs - Negative crystals, no growth, 3015 WBC, gram stain neg.    ASSESSMENT & PLAN  Mr. Palafox is a 43 year old year old male who presents to clinic today to review MRI of left knee and discuss largely improving pain and debility of left knee.  Labs unremarkable. The lack of mechanism of injury and current presentation remains somewhat elusive. After reviewing MRI it is quite atypical that he has suffered a popliteus/possible posterior lateral capsule injury, posterior oblique ligament of PMC without any  acute inciting event.  However he was working in the shop on Saturday before the injury which which he notes being on his knees and other compromising positions at times.  It is also possible that pain mostly related to ruptured popliteal cyst.  We discussed considering physical therapy as well as corticosteroid injection to control synovitis/effusion and may help aid in pain control from ruptured baker's cyst.     -Cortical steroid injection to left knee  -Continue wearing hinged knee brace, Tubigrip sleeve  -Ibuprofen, Tylenol as needed, de-escalate from tramadol  -Start physical therapy for guided range of motion and strengthening program;   -Follow up 4 weeks    PROCEDURE    Left Knee Injection - Intraarticular  The patient was informed of the risks and the benefits of the procedure and a written consent was signed.  The patient s Left knee was prepped with chlorhexidine in sterile fashion.   40 mg of triamcinolone suspension was drawn up into a 5 mL syringe with 4 mL of 1% lidocaine.  Injection was performed using substerile technique.  A 1.5-inch 22-gauge needle was used to enter the lateral aspect of the left knee.  Injection performed successfully without difficulty.  There were no complications. The patient tolerated the procedure well. There was negligible bleeding.   The patient was instructed to ice the knee upon leaving clinic and refrain from overuse over the next 3 days.   The patient was instructed to call or go to the emergency room with any unusual pain, swelling, redness, or if otherwise concerned.  A follow up appointment will be scheduled to evaluate response to the injection, and to assess range of motion and pain.    It was a pleasure seeing Gerry.    Oscar Barry DO, CAM  Primary Care Sports Medicine

## 2018-12-29 NOTE — NURSING NOTE
[address] [address]  Dept: [phone]  ______________________________________________________________________________    Patient: Gerry Palafox   : 1975   MRN: [id number]   2018    INVASIVE PROCEDURE SAFETY CHECKLIST    Date: 18  Procedure: Left knee kenalog injection  Patient Name: Gerry Palafox  MRN: [id number]  YOB: 1975    Action: Complete sections as appropriate. Any discrepancy results in a HARD COPY until resolved.     PRE PROCEDURE:  Patient ID verified with 2 identifiers (name and  or MRN): Yes  Procedure and site verified with patient/designee (when able): Yes  Accurate consent documentation in medical record: Yes  H&P (or appropriate assessment) documented in medical record: Yes  H&P must be up to 20 days prior to procedure and updates within 24 hours of procedure as applicable: NA  Relevant diagnostic and radiology test results appropriately labeled and displayed as applicable: Yes  Procedure site(s) marked with provider initials: NA    TIMEOUT:  Time-Out performed immediately prior to starting procedure, including verbal and active participation of all team members addressing the following:Yes  * Correct patient identify  * Confirmed that the correct side and site are marked  * An accurate procedure consent form  * Agreement on the procedure to be done  * Correct patient position  * Relevant images and results are properly labeled and appropriately displayed  * The need to administer antibiotics or fluids for irrigation purposes during the procedure as applicable   * Safety precautions based on patient history or medication use    DURING PROCEDURE: Verification of correct person, site, and procedures any time the responsibility for care of the patient is transferred to another member of the care team.     The following medication was given:     MEDICATION:  Lidocaine without epinephrine  ROUTE:  intra-articular  SITE: left Knee  DOSE: 4 mL  LOT #: [id number]  : [employer]  EXPIRATION DATE: 1OCT2020  NDC#: 6500-7200-45   Was there drug waste? Yes  Amount of drug waste (mL): 16.  Reason for waste:  Single use vial    MEDICATION:  Kenalog 40 mg  ROUTE: intra-articular  SITE: left Knee  DOSE: 1 mL  LOT #: [id number]  : [employer]  EXPIRATION DATE: 04/2020  NDC#: [address]-0   Was there drug waste? No    Jodi Johnson, ATC  December 29, 2018

## 2018-12-29 NOTE — PROGRESS NOTES
SPORTS & ORTHOPEDIC WALK-IN FOLLOW-UP VISIT 12/29/2018    Interval History:     Follow up reason: MRI results    Date of injury: 12/16/18    Date last seen: 12/19/18    Following Therapeutic Plan: Yes     Pain: Improving    Function: Improving    Interval History: Feeling a lot better, walking without crutches starting Cecile Portillo.  Got brace on the 26th which seems to be helping. Feels more secure. After the MRI swelling went down into ankle and foot. Believes he had a gout flare in his toe which has pretty much subsided. States since MRI his achilles tightens up if he sits for too long, hoping for some suggestions to prevent that.     Medical History:    Any recent changes to your medical history? No    Any new medication prescribed since last visit? No           Large Joint Injection: L knee joint  Date/Time: 12/29/2018 3:04 PM  Performed by: Oscar Barry DO  Authorized by: Oscar Barry DO     Indications:  Pain  Needle Size:  22 G  Guidance: landmark guided    Location:  Knee  Site:  L knee joint  Medications:  40 mg triamcinolone 40 MG/ML; 4 mL lidocaine 1 %  Outcome:  Tolerated well, no immediate complications  Procedure discussed: discussed risks, benefits, and alternatives    Consent Given by:  Patient  Timeout: timeout called immediately prior to procedure    Prep: patient was prepped and draped in usual sterile fashion

## 2019-01-03 ENCOUNTER — THERAPY VISIT (OUTPATIENT)
Dept: PHYSICAL THERAPY | Facility: CLINIC | Age: 44
End: 2019-01-03
Payer: COMMERCIAL

## 2019-01-03 DIAGNOSIS — M25.562 ACUTE PAIN OF LEFT KNEE: ICD-10-CM

## 2019-01-03 PROCEDURE — 97110 THERAPEUTIC EXERCISES: CPT | Mod: GP | Performed by: PHYSICAL THERAPIST

## 2019-01-03 PROCEDURE — 97161 PT EVAL LOW COMPLEX 20 MIN: CPT | Mod: GP | Performed by: PHYSICAL THERAPIST

## 2019-01-03 NOTE — PROGRESS NOTES
[facility] Initial Evaluation  Subjective:  The history is provided by the patient. No  was used.   Gerry Palafox is a 43 year old male with a left knee condition.      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 Large joint effusion.  2. Small popliteal cyst leaking superiorly, possibly ruptured.  3. Edema signal superficial to popliteus tendon and musculature,  possibly related to underlying posterior lateral capsular injury or  ultra related to popliteus muscle strain.  4. 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  5. Mucoid/cystic degeneration of ACL    Pt had an steroid injection on 12/29/18 which has helped; pt also started to wear a hinged knee brace which has helped; currently pt reports posterior knee pain with instability; wearing a brace all the time - off for 1 hour during the day; swelling has improved since injection   .    Patient reports pain:  Anterior and posterior.  Radiates to:  Ankle (L TA - feels stiff ).  Pain is described as aching and is intermittent and reported as 1/10 and 2/10.  Associated symptoms:  Edema, loss of strength and loss of motion/stiffness. Pain is worse in the P.M..  Symptoms are exacerbated by ascending stairs, descending stairs, activity, bending/squatting and kneeling and relieved by bracing/immobilizing, NSAID's, ice and rest (knee and ankle ).  Since onset symptoms are gradually improving.  Special tests:  MRI.      General health as reported by patient is good.  Pertinent medical history includes:  Asthma.       Current medications:  Anti-inflammatory.  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  Patient is working in normal job without restrictions.  Primary job tasks include:  Operating a machine, repetitive tasks and prolonged standing.                                Objective:  Standing Alignment:              Knee deviations alignment: L knee in slight flexion in standing - reports post knee tightness and post ankle tightess in standing.                                                       Hip Evaluation    Hip Strength:    Flexion:   Left: 4/5   Pain:  Right: 5/5   Pain:                    Extension:  Left: 4/5  Pain:Right: 5/5    Pain:    Abduction:  Left: 4/5     Pain:Right: 5/5    Pain:      External Rotation:  Left: 4/5   Pain:  Right: 5/5   Pain:                     Knee Evaluation:  ROM:    AROM    Hyperextension:  Left:  0    Right: 0  Extension:  Left: 10    Right:  0  Flexion: Left: 125    Right: 130  PROM    Hyperextension: Left: 0    Right:  0  Extension: Left: 5    Right:  0  Flexion: Left: 128    Right:  135      Strength:     Extension:  Left: 4/5   Pain:      Right: 5/5   Pain:  Flexion:  Left: 5/5   Pain:      Right: 5/5   Pain:    Quad Set Left: Fair    Pain:   Quad Set Right: WNL    Pain:      Palpation:    Left knee tenderness present at:  Lateral Joint Line and Biceps Femoral  Left knee tenderness not present at:  Medial Joint Line; Patellar Tendon; Popliteal; Semitendinosus; Semembranosus; Patellar Medial; Patellar Lateral; Patellar Superior and Patellar Inferior        Functional Testing:          Quad:    Single Leg Squat:  Left:        Requires support   Moderate loss of control  Right:       Normal control  Bilateral Leg Squat:  Wt shift to R with deep squat   Mild loss of control              General     ROS    Assessment/Plan:    Patient is a 43 year old male with left side knee complaints.    Patient has the following significant findings with corresponding treatment  plan.                Diagnosis 1:  Acute L knee pain  Pain -  hot/cold therapy, manual therapy, self management, education, directional preference exercise and home program  Decreased ROM/flexibility - manual therapy, therapeutic exercise and home program  Decreased joint mobility - manual therapy and therapeutic exercise  Decreased strength - therapeutic exercise and therapeutic activities  Decreased proprioception - neuro re-education and therapeutic activities  Impaired gait - gait training  Impaired muscle performance - neuro re-education  Decreased function - therapeutic activities    Therapy Evaluation Codes:   1) History comprised of:   Personal factors that impact the plan of care:      Profession.    Comorbidity factors that impact the plan of care are:      Asthma and Sleep disorder/apnea.     Medications impacting care: Anti-inflammatory.  2) Examination of Body Systems comprised of:   Body structures and functions that impact the plan of care:      Ankle and Knee.   Activity limitations that impact the plan of care are:      Squatting/kneeling, Stairs and Working.  3) Clinical presentation characteristics are:   Stable/Uncomplicated.  4) Decision-Making    Low complexity using standardized patient assessment instrument and/or measureable assessment of functional outcome.  Cumulative Therapy Evaluation is: Low complexity.    Previous and current functional limitations:  (See Goal Flow Sheet for this information)    Short term and Long term goals: (See Goal Flow Sheet for this information)     Communication ability:  Patient appears to be able to clearly communicate and understand verbal and written communication and follow directions correctly.  Treatment Explanation - The following has been discussed with the patient:   RX ordered/plan of care  Anticipated outcomes  Possible risks and side effects  This patient would benefit from PT intervention to resume normal activities.   Rehab potential is  excellent.    Frequency:  1 X week, once daily  Duration:  for 8 weeks  Discharge Plan:  Achieve all LTG.  Independent in home treatment program.  Return to previous functional level by discharge.  Reach maximal therapeutic benefit.    Please refer to the daily flowsheet for treatment today, total treatment time and time spent performing 1:1 timed codes.

## 2019-01-15 ENCOUNTER — THERAPY VISIT (OUTPATIENT)
Dept: PHYSICAL THERAPY | Facility: CLINIC | Age: 44
End: 2019-01-15
Payer: COMMERCIAL

## 2019-01-15 DIAGNOSIS — M25.562 LEFT KNEE PAIN: ICD-10-CM

## 2019-01-15 PROCEDURE — 97140 MANUAL THERAPY 1/> REGIONS: CPT | Mod: GP | Performed by: PHYSICAL THERAPIST

## 2019-01-15 PROCEDURE — 97110 THERAPEUTIC EXERCISES: CPT | Mod: GP | Performed by: PHYSICAL THERAPIST

## 2019-01-17 DIAGNOSIS — J45.20 MILD INTERMITTENT ASTHMA, UNCOMPLICATED: ICD-10-CM

## 2019-01-17 RX ORDER — ALBUTEROL SULFATE 90 UG/1
2 AEROSOL, METERED RESPIRATORY (INHALATION) EVERY 6 HOURS PRN
Qty: 1 INHALER | Refills: 0 | Status: SHIPPED | OUTPATIENT
Start: 2019-01-17 | End: 2019-02-12

## 2019-01-17 NOTE — TELEPHONE ENCOUNTER
"Requested Prescriptions   Pending Prescriptions Disp Refills     albuterol (PROAIR HFA/PROVENTIL HFA/VENTOLIN HFA) 108 (90 Base) MCG/ACT inhaler [Pharmacy Med Name: VENTOLIN HFA 90 MCG INHALER] 54 Inhaler 0    Last Written Prescription Date:  05/25/2017  Last Fill Quantity: 3,  # refills: 3   Last office visit: 5/29/2018 with prescribing provider:  05/29/2018   Future Office Visit:   Sig: Inhale 2 puffs into the lungs every 6 hours as needed for shortness of breath / dyspnea or wheezing    Asthma Maintenance Inhalers - Anticholinergics Failed - 1/17/2019 10:11 AM       Failed - Asthma control assessment score within normal limits in last 6 months    Please review ACT score.          Failed - Recent (6 mo) or future (30 days) visit within the authorizing provider's specialty    Patient had office visit in the last 6 months or has a visit in the next 30 days with authorizing provider or within the authorizing provider's specialty.  See \"Patient Info\" tab in inbasket, or \"Choose Columns\" in Meds & Orders section of the refill encounter.           Passed - Patient is age 12 years or older       Passed - Medication is active on med list        "

## 2019-01-17 NOTE — TELEPHONE ENCOUNTER
Called patient, left voicemail to return call to clinic.    Due for asthma follow up and updated PHQ-9    Michelle Mcconnell, RN  Triage Nurse

## 2019-01-17 NOTE — TELEPHONE ENCOUNTER
Medication is being filled for 1 time refill only due to:  Patient needs to be seen because 6 month asthma f/u. ACT 17    Patient returned call to clinic. Appointment scheduled 01/28/2019    Michelle Mcconnell RN  Triage Nurse

## 2019-01-18 ASSESSMENT — ASTHMA QUESTIONNAIRES: ACT_TOTALSCORE: 17

## 2019-01-28 ENCOUNTER — THERAPY VISIT (OUTPATIENT)
Dept: PHYSICAL THERAPY | Facility: CLINIC | Age: 44
End: 2019-01-28
Payer: COMMERCIAL

## 2019-01-28 DIAGNOSIS — M25.562 LEFT KNEE PAIN: ICD-10-CM

## 2019-01-28 PROCEDURE — 97112 NEUROMUSCULAR REEDUCATION: CPT | Mod: GP | Performed by: PHYSICAL THERAPIST

## 2019-01-28 PROCEDURE — 97110 THERAPEUTIC EXERCISES: CPT | Mod: GP | Performed by: PHYSICAL THERAPIST

## 2019-02-11 DIAGNOSIS — J45.20 MILD INTERMITTENT ASTHMA, UNCOMPLICATED: ICD-10-CM

## 2019-02-11 NOTE — TELEPHONE ENCOUNTER
"Requested Prescriptions   Pending Prescriptions Disp Refills     albuterol (PROAIR HFA/PROVENTIL HFA/VENTOLIN HFA) 108 (90 Base) MCG/ACT inhaler 1 Inhaler 0     Sig: Inhale 2 puffs into the lungs every 6 hours as needed for shortness of breath / dyspnea or wheezing    Asthma Maintenance Inhalers - Anticholinergics Failed - 2/11/2019 10:04 AM       Failed - Asthma control assessment score within normal limits in last 6 months    Please review ACT score.          Failed - Recent (6 mo) or future (30 days) visit within the authorizing provider's specialty    Patient had office visit in the last 6 months or has a visit in the next 30 days with authorizing provider or within the authorizing provider's specialty.  See \"Patient Info\" tab in inbasket, or \"Choose Columns\" in Meds & Orders section of the refill encounter.           Passed - Patient is age 12 years or older       Passed - Medication is active on med list        "

## 2019-02-12 RX ORDER — ALBUTEROL SULFATE 90 UG/1
2 AEROSOL, METERED RESPIRATORY (INHALATION) EVERY 6 HOURS PRN
Qty: 1 INHALER | Refills: 0 | Status: SHIPPED | OUTPATIENT
Start: 2019-02-12 | End: 2019-03-19

## 2019-04-19 ENCOUNTER — TELEPHONE (OUTPATIENT)
Dept: FAMILY MEDICINE | Facility: CLINIC | Age: 44
End: 2019-04-19

## 2019-04-19 NOTE — TELEPHONE ENCOUNTER
Panel Management Review      Patient has the following on his problem list:     Asthma review     ACT Total Scores 1/17/2019   ACT TOTAL SCORE -   ASTHMA ER VISITS -   ASTHMA HOSPITALIZATIONS -   ACT TOTAL SCORE (Goal Greater than or Equal to 20) 17   In the past 12 months, how many times did you visit the emergency room for your asthma without being admitted to the hospital? 0   In the past 12 months, how many times were you hospitalized overnight because of your asthma? 0      1. Is Asthma diagnosis on the Problem List? Yes    2. Is Asthma listed on Health Maintenance? Yes    3. Patient is due for:  ACT      Composite cancer screening  Chart review shows that this patient is due/due soon for the following None  Summary:    Patient is due/failing the following:   ACT    Action needed:   Patient needs to do ACT.    Type of outreach:    Sent letter.    Questions for provider review:    None                                                                                                                                    Walker Rios    [facility] [place] to Care Team .

## 2019-05-11 ASSESSMENT — ASTHMA QUESTIONNAIRES: ACT_TOTALSCORE: 23

## 2019-05-13 ENCOUNTER — THERAPY VISIT (OUTPATIENT)
Dept: PHYSICAL THERAPY | Facility: CLINIC | Age: 44
End: 2019-05-13
Payer: COMMERCIAL

## 2019-05-13 DIAGNOSIS — M25.562 LEFT KNEE PAIN: ICD-10-CM

## 2019-05-13 PROCEDURE — 97110 THERAPEUTIC EXERCISES: CPT | Mod: GP | Performed by: PHYSICAL THERAPIST

## 2019-05-13 PROCEDURE — 97112 NEUROMUSCULAR REEDUCATION: CPT | Mod: GP | Performed by: PHYSICAL THERAPIST

## 2019-05-13 NOTE — PROGRESS NOTES
PROGRESS  REPORT    Progress reporting period is from IE to 5/13/2019.       SUBJECTIVE  Subjective changes noted by patient:  yes.  Subjective: Gradual improvemnt in knee.  Not taping anymore.  Spoke w ortho surgeon regarding ankle flare, he rec some medications and it is better.  Feels like he is getting stronger, getting a little better getting up from floor.  Most activity is work related reight now- both very physical.  Plans to start biking to work, going to [place] end of June and hiking.    Current pain level is 0/10 Current Pain level: 0/10.     Previous pain level was  6/10 Initial Pain level: 2/10.   Changes in function:  Yes (See Goal flowsheet attached for changes in current functional level)  Adverse reaction to treatment or activity: None    OBJECTIVE  Changes noted in objective findings:  Yes  Objective: Question re: systemic inflammation and monitoring of any patterns.  Analyzed movement patterns during work squat/lift and found deficits in right soleus length, poor glut recruitment during squat and lunge positioning, and we focused exercises on this.  Knee PROM normal and pain free.     ASSESSMENT/PLAN  Updated problem list and treatment plan: Diagnosis 1:  Left knee pain  Decreased strength - therapeutic exercise and therapeutic activities  Decreased proprioception - neuro re-education and therapeutic activities  Impaired muscle performance - neuro re-education  Decreased function - therapeutic activities  STG/LTGs have been met or progress has been made towards goals:  Yes (See Goal flow sheet completed today.)  Assessment of Progress: The patient's condition is improving.  Self Management Plans:  Patient has been instructed in a home treatment program.  I have re-evaluated this patient and find that the nature, scope, duration and intensity of the therapy is appropriate for the medical condition of the patient.  Gerry continues to require the following intervention to meet STG and LTG's:   PT    Recommendations:  This patient would benefit from continued therapy.     Frequency:  1 X a month, once daily  Duration:  for 3 months        Please refer to the daily flowsheet for treatment today, total treatment time and time spent performing 1:1 timed codes.

## 2019-08-22 NOTE — PROGRESS NOTES
Patient did not return for further treatment and no additional progress was noted.  Please refer to the progress note and goal flowsheet completed on 05/13/19 for discharge information.    Yanira Lucas, PT, OCS, Cert MDT #[id number]

## 2019-09-05 PROBLEM — M25.562 LEFT KNEE PAIN: Status: RESOLVED | Noted: 2019-01-03 | Resolved: 2019-09-05

## 2020-03-02 ENCOUNTER — HEALTH MAINTENANCE LETTER (OUTPATIENT)
Age: 45
End: 2020-03-02

## 2020-04-03 DIAGNOSIS — J45.20 MILD INTERMITTENT ASTHMA, UNCOMPLICATED: ICD-10-CM

## 2020-04-03 RX ORDER — ALBUTEROL SULFATE 90 UG/1
AEROSOL, METERED RESPIRATORY (INHALATION)
Qty: 18 INHALER | Refills: 0 | OUTPATIENT
Start: 2020-04-03

## 2020-04-03 NOTE — TELEPHONE ENCOUNTER
"Requested Prescriptions   Pending Prescriptions Disp Refills     VENTOLIN  (90 Base) MCG/ACT inhaler [Pharmacy Med Name: VENTOLIN HFA 90 MCG INHALER]  Last Written Prescription Date:  03/21/2019  Last Fill Quantity: 8.5g,  # refills: 0   Last office visit: 5/29/2018 with prescribing provider:  CHARO Mathews CNP   Future Office Visit:     18 Inhaler 0     Sig: INHALE 1-2 PUFFS INTO THE LUNGS EVERY 4 HOURS AS NEEDED FOR SHORTNESS OF BREATH/DYSPNEA/WHEEZING.       Asthma Maintenance Inhalers - Anticholinergics Failed - 4/3/2020 10:02 AM        Failed - Asthma control assessment score within normal limits in last 6 months     Please review ACT score.           Failed - Recent (6 mo) or future (30 days) visit within the authorizing provider's specialty     Patient had office visit in the last 6 months or has a visit in the next 30 days with authorizing provider or within the authorizing provider's specialty.  See \"Patient Info\" tab in inbasket, or \"Choose Columns\" in Meds & Orders section of the refill encounter.            Passed - Patient is age 12 years or older        Passed - Medication is active on med list       Short-Acting Beta Agonist Inhalers Protocol  Failed - 4/3/2020 10:02 AM        Failed - Asthma control assessment score within normal limits in last 6 months     Please review ACT score.           Failed - Recent (6 mo) or future (30 days) visit within the authorizing provider's specialty     Patient had office visit in the last 6 months or has a visit in the next 30 days with authorizing provider or within the authorizing provider's specialty.  See \"Patient Info\" tab in inbasket, or \"Choose Columns\" in Meds & Orders section of the refill encounter.            Passed - Patient is age 12 or older        Passed - Medication is active on med list             "

## 2020-04-03 NOTE — TELEPHONE ENCOUNTER
Refill request denied  Has not been seen in clinic in over 1.5 years  Spoke with pt he will do an Evisit    Mahsa Prasad RN   [facility]

## 2020-06-25 ENCOUNTER — TELEPHONE (OUTPATIENT)
Dept: ORTHOPEDICS | Facility: CLINIC | Age: 45
End: 2020-06-25

## 2020-06-25 ENCOUNTER — OFFICE VISIT (OUTPATIENT)
Dept: ORTHOPEDICS | Facility: CLINIC | Age: 45
End: 2020-06-25
Payer: COMMERCIAL

## 2020-06-25 DIAGNOSIS — M10.072 ACUTE IDIOPATHIC GOUT INVOLVING TOE OF LEFT FOOT: Primary | ICD-10-CM

## 2020-06-25 RX ADMIN — TRIAMCINOLONE ACETONIDE 40 MG: 40 INJECTION, SUSPENSION INTRA-ARTICULAR; INTRAMUSCULAR at 11:36

## 2020-06-25 RX ADMIN — LIDOCAINE HYDROCHLORIDE 1 ML: 10 INJECTION, SOLUTION EPIDURAL; INFILTRATION; INTRACAUDAL; PERINEURAL at 11:36

## 2020-06-25 NOTE — LETTER
6/25/2020     RE: Gerry Palafox  [address] [place] [address]    Dear Colleague,    Thank you for referring your patient, Gerry Palafox, to the [facility]. Please see a copy of my visit note below.          SPORTS & ORTHOPEDIC WALK-IN VISIT 6/25/2020    Primary Care Physician: Pooja Rodriguez      Reason for visit:     What part of your body is injured / painful?  left great toe    What caused the injury /pain? No inciting event, has prior h/o gout. Assumes this is the same     How long ago did your injury occur or pain begin? 2 days    What are your most bothersome symptoms? Pain and Swelling    How would you characterize your symptom?  Aching and sharp with movement    What makes your symptoms better? Has been icing and taking aleve with slight improvement    What makes your symptoms worse? Walking, Movement and Bending    Have you been previously seen for this problem? Yes, most recently had injection in toe ~1.5 yrs ago    Medical History:    Any recent changes to your medical history? No    Any new medication prescribed since last visit? No    Have you had surgery on this body part before? No    Social History:    Occupation: [employer]      Review of Systems:    Do you have fever, chills, weight loss? No    Do you have any vision problems? No    Do you have any chest pain or edema? No    Do you have any shortness of breath or wheezing?  No    Do you have stomach problems? No    Do you have any numbness or focal weakness? No    Do you have diabetes? No    Do you have problems with bleeding or clotting? No    Do you have an rashes or other skin lesions? No         Past Medical History, Current Medications, and Allergies are reviewed in the electronic medical record as appropriate.       EXAM:There were no vitals taken for this visit.    General  - alert, pleasant, no distress  CV  - normal radial pulse, cap refill brisk  Musculoskeletal - left great toe  - inspection:  Soft tissue swelling and effusion present about the first MTP  - palpation: Diffusely tender about the MTP joint.  No other bony or soft tissue tenderness, no tenderness throughout the midfoot  - ROM:  MTP 30 deg flexion   80 deg extension pain with passive motion of the MTP joint   IP 50 deg flexion   50 deg extension   - strength: 5/5 toe flexion, 5/5 toe extension, 5/5 dorsiflexion, 5/5 plantar flexion  - special tests:  (-) varus at IP joints  (-) valgus at IP joints  Neuro  - no numbness, no motor deficit, grossly normal coordination, normal muscle tone  Skin  - no ecchymosis or induration, no obvious rash    Imaging: no imaging this visit      Assessment: Patient is a 45 year old male with gout flare of the left first MTP    Recommendations:   Reviewed assessment the patient in detail  After discussion of treatment options patient would like to pursue steroid injection as he has had very good relief with these injections in the past.  See procedure note for details.    Tiago Ray MD                    Small Joint Injection: L great MTP    Date/Time: 6/25/2020 11:36 AM  Performed by: Tiago Ray MD  Authorized by: Tiago Ray MD   Indications:  Pain  Needle Size:  25 G  Guidance: ultrasound     Approach:  Dorsal  Location:  Great toe    Site:  L great MTP                    Medications:  1 mL lidocaine (PF) 1 %; 40 mg triamcinolone 40 MG/ML        Outcome:  Tolerated well, no immediate complications  Procedure discussed: discussed risks, benefits, and alternatives    Consent Given by:  Patient  Timeout: timeout called immediately prior to procedure    Prep: patient was prepped and draped in usual sterile fashion       The patient was apprised of the risks and the benefits of the procedure written consent was signed by the patient.   Landmarks were located on the left great toe and the area was marked and cleaned with chlorhexadine swab.   Using ultrasound guidance and sterile  technique, the skin was anesthetized with ethyl chloride spray, and a 25g needle was introduced into the joint space under direct and continuous ultrasound guidance. Then 40 mg of triamcinolone along with 1.0 mL of 1% lidocaine was injected easily and seen flowing into the MTP joint space. Images saved to permanent record.   There were no complications. The patient tolerated the procedure well. There was minimal bleeding.   The patient was instructed to ice the toe upon leaving clinic and refrain from overuse over the next 2 days.   The patient was instructed to go to the emergency room with any usual pain, swelling, or redness occurred in the injected area.   Tiago Ray MD

## 2020-06-25 NOTE — TELEPHONE ENCOUNTER
Gerry called from down stairs and was hoping to receive an injection for his L toe gout flare.  I checked with Dr. Ray to make sure he would be able to do this and he said it would be alright.  Added him to walk in schedule for 11AM.     - Martin FIELDS ATC

## 2020-06-25 NOTE — PROGRESS NOTES
Small Joint Injection: L great MTP    Date/Time: 6/25/2020 11:36 AM  Performed by: Tiago Ray MD  Authorized by: Tiago Ray MD   Indications:  Pain  Needle Size:  25 G  Guidance: ultrasound     Approach:  Dorsal  Location:  Great toe    Site:  L great MTP                    Medications:  1 mL lidocaine (PF) 1 %; 40 mg triamcinolone 40 MG/ML        Outcome:  Tolerated well, no immediate complications  Procedure discussed: discussed risks, benefits, and alternatives    Consent Given by:  Patient  Timeout: timeout called immediately prior to procedure    Prep: patient was prepped and draped in usual sterile fashion       The patient was apprised of the risks and the benefits of the procedure written consent was signed by the patient.   Landmarks were located on the left great toe and the area was marked and cleaned with chlorhexadine swab.   Using ultrasound guidance and sterile technique, the skin was anesthetized with ethyl chloride spray, and a 25g needle was introduced into the joint space under direct and continuous ultrasound guidance. Then 40 mg of triamcinolone along with 1.0 mL of 1% lidocaine was injected easily and seen flowing into the MTP joint space. Images saved to permanent record.   There were no complications. The patient tolerated the procedure well. There was minimal bleeding.   The patient was instructed to ice the toe upon leaving clinic and refrain from overuse over the next 2 days.   The patient was instructed to go to the emergency room with any usual pain, swelling, or redness occurred in the injected area.   Tiago Ray MD

## 2020-06-25 NOTE — NURSING NOTE
[address] [address]  Dept: [phone]  ______________________________________________________________________________    Patient: Gerry Palafox   : 1975   MRN: [id number]   2020    INVASIVE PROCEDURE SAFETY CHECKLIST    Date: 2020  Procedure: Left great toe kenalog injection  Patient Name: Gerry Palafox  MRN: [id number]  YOB: 1975    Action: Complete sections as appropriate. Any discrepancy results in a HARD COPY until resolved.     PRE PROCEDURE:  Patient ID verified with 2 identifiers (name and  or MRN): Yes  Procedure and site verified with patient/designee (when able): Yes  Accurate consent documentation in medical record: Yes  H&P (or appropriate assessment) documented in medical record: Yes  H&P must be up to 20 days prior to procedure and updates within 24 hours of procedure as applicable: NA  Relevant diagnostic and radiology test results appropriately labeled and displayed as applicable: Yes  Procedure site(s) marked with provider initials: NA    TIMEOUT:  Time-Out performed immediately prior to starting procedure, including verbal and active participation of all team members addressing the following:Yes  * Correct patient identify  * Confirmed that the correct side and site are marked  * An accurate procedure consent form  * Agreement on the procedure to be done  * Correct patient position  * Relevant images and results are properly labeled and appropriately displayed  * The need to administer antibiotics or fluids for irrigation purposes during the procedure as applicable   * Safety precautions based on patient history or medication use    DURING PROCEDURE: Verification of correct person, site, and procedures any time the responsibility for care of the patient is transferred to another member of the care team.     The following medication was given:     MEDICATION:  Kenalog 40 mg  ROUTE:  intra-articular  SITE: left great Toe  DOSE: 1mL  LOT #: [id number]  : Extenda-Dent  EXPIRATION DATE: 11/2021  NDC#: [address]-6   Was there drug waste? No    MEDICATION:  Lidocaine without epinephrine  ROUTE: intra-articular  SITE: left great Toe  DOSE: 1mL  LOT #: [id number]  : Synthego  EXPIRATION DATE: 02/2022  NDC#: 42562-073-11   Was there drug waste? Yes  Amount of drug waste (mL): 4.  Reason for waste:  As per MD    Prior to injection, verified patient identity using patient's name and date of birth.  Due to injection administration, patient instructed to remain in clinic for 15 minutes  afterwards, and to report any adverse reaction to me immediately.    Carmela Potter, ATC  June 25, 2020

## 2020-06-25 NOTE — PROGRESS NOTES
SPORTS & ORTHOPEDIC WALK-IN VISIT 6/25/2020    Primary Care Physician: Pooja Rodriguez      Reason for visit:     What part of your body is injured / painful?  left great toe    What caused the injury /pain? No inciting event, has prior h/o gout. Assumes this is the same     How long ago did your injury occur or pain begin? 2 days    What are your most bothersome symptoms? Pain and Swelling    How would you characterize your symptom?  Aching and sharp with movement    What makes your symptoms better? Has been icing and taking aleve with slight improvement    What makes your symptoms worse? Walking, Movement and Bending    Have you been previously seen for this problem? Yes, most recently had injection in toe ~1.5 yrs ago    Medical History:    Any recent changes to your medical history? No    Any new medication prescribed since last visit? No    Have you had surgery on this body part before? No    Social History:    Occupation: [employer]      Review of Systems:    Do you have fever, chills, weight loss? No    Do you have any vision problems? No    Do you have any chest pain or edema? No    Do you have any shortness of breath or wheezing?  No    Do you have stomach problems? No    Do you have any numbness or focal weakness? No    Do you have diabetes? No    Do you have problems with bleeding or clotting? No    Do you have an rashes or other skin lesions? No         Past Medical History, Current Medications, and Allergies are reviewed in the electronic medical record as appropriate.       EXAM:There were no vitals taken for this visit.    General  - alert, pleasant, no distress  CV  - normal radial pulse, cap refill brisk  Musculoskeletal - left great toe  - inspection: Soft tissue swelling and effusion present about the first MTP  - palpation: Diffusely tender about the MTP joint.  No other bony or soft tissue tenderness, no tenderness throughout the midfoot  - ROM:  MTP 30 deg flexion   80 deg extension  pain with passive motion of the MTP joint   IP 50 deg flexion   50 deg extension   - strength: 5/5 toe flexion, 5/5 toe extension, 5/5 dorsiflexion, 5/5 plantar flexion  - special tests:  (-) varus at IP joints  (-) valgus at IP joints  Neuro  - no numbness, no motor deficit, grossly normal coordination, normal muscle tone  Skin  - no ecchymosis or induration, no obvious rash    Imaging: no imaging this visit      Assessment: Patient is a 45 year old male with gout flare of the left first MTP    Recommendations:   Reviewed assessment the patient in detail  After discussion of treatment options patient would like to pursue steroid injection as he has had very good relief with these injections in the past.  See procedure note for details.    Tiago Ray MD

## 2020-06-28 RX ORDER — LIDOCAINE HYDROCHLORIDE 10 MG/ML
1 INJECTION, SOLUTION EPIDURAL; INFILTRATION; INTRACAUDAL; PERINEURAL
Status: SHIPPED | OUTPATIENT
Start: 2020-06-25

## 2020-06-28 RX ORDER — TRIAMCINOLONE ACETONIDE 40 MG/ML
40 INJECTION, SUSPENSION INTRA-ARTICULAR; INTRAMUSCULAR
Status: SHIPPED | OUTPATIENT
Start: 2020-06-25

## 2020-12-14 ENCOUNTER — HEALTH MAINTENANCE LETTER (OUTPATIENT)
Age: 45
End: 2020-12-14

## 2021-01-25 ENCOUNTER — E-VISIT (OUTPATIENT)
Dept: URGENT CARE | Facility: URGENT CARE | Age: 46
End: 2021-01-25
Payer: COMMERCIAL

## 2021-01-25 DIAGNOSIS — Z20.822 CLOSE EXPOSURE TO 2019 NOVEL CORONAVIRUS: Primary | ICD-10-CM

## 2021-01-25 PROCEDURE — 99421 OL DIG E/M SVC 5-10 MIN: CPT | Performed by: PHYSICIAN ASSISTANT

## 2021-01-25 NOTE — PATIENT INSTRUCTIONS
"  Dear Gerry Palafox,    Based on your exposure to COVID-19 (coronavirus), we would like to test you for this virus. I have placed an order for this test. The best time for testing is 5-7 days after the exposure.    How to schedule:  For all employees or close contacts (except [place] and [employer] - see below), go to your [employer] home page and scroll down to the section that says  You have an appointment that needs to be scheduled  and click the large green button that says  Schedule Now  and follow the steps to find the next available opening.     If you are unable to complete these steps or if you cannot find any available times, please call [phone] to schedule employee testing.     [place] employees or close contacts, please call [phone].   [place] ([employer]) employees or close contacts call [phone].    Return to work/school/ guidance:   For people with high risk exposures outside the home    Please let your workplace manager and staffing office know when your quarantine ends.     We can not give you an exact date as it depends on the information below. You can calculate this on your own or work with your manager/staffing office to calculate this. (For example if you were exposed on 10/4, you would have to quarantine for 14 full days. That would be through 10/18. You could return on 10/19.)    Quarantine Guidelines:  Patients (\"contacts\") who have been in close prolonged contact of an infected person(s) (within six feet for at least 15 minutes within a 24 hour period), and remain asymptomatic should enter quarantine based on the following options:    14-day quarantine period (this remains the CDC recommendation for the greatest protection against spread of COVID-19) OR    Minimum 7-day quarantine with negative RT-PCR test collected on day 5 or later OR    10-day quarantine with no test  Quarantine Guideline exceptions are as follows:  ? People whose high-risk exposure was a household " member should always quarantine for 14 days from their last date of exposure  ? Residents of congregate care and congregate living settings should always quarantine for 14 days from their last date of exposure  ? Individuals who work in health care, congregate care, or congregate living should be off work for 14 days from their last date of exposure. Community activities for this group can be resumed based on options above.  ? For people with high risk exposure to an individual they live with it is still recommended to quarantine for 14 days the last date of exposure even if the covid test is negative.     Note: If you have ongoing exposure to the covid positive person, this quarantine period may be more than 14 days. (For example, if you are continued to be exposed to your child who tested positive and cannot isolate from them, then the quarantine of 7-14 days can't start until your child is no longer contagious. This is typically 10 days from onset of the child's symptoms. So the total duration may be 17-24 days in this case.)    You should continue symptom monitoring until day 14 post-exposure. If you develop signs or symptoms of COVID-19, isolate and get tested (even if you have been tested already).    How to quarantine:   Stay home and away from others. Don't go to school or anywhere else. Generally quarantine means staying home from work but there are some exceptions to this. Please contact your workplace.  No hugging, kissing or shaking hands.  Don't let anyone visit.  Cover your mouth and nose with a mask, tissue or washcloth to avoid spreading germs.  Wash your hands and face often. Use soap and water.    What are the symptoms of COVID-19?  The most common symptoms are cough, fever and trouble breathing. Less common symptoms include headache, body aches, fatigue (feeling very tired), chills, sore throat, stuffy or runny nose, diarrhea (loose poop), loss of taste or smell, belly pain, and nausea or vomiting  (feeling sick to your stomach or throwing up).  After 14 days, if you have still don't have symptoms, you likely don't have this virus.  If you develop symptoms, follow these guidelines.  If you're normally healthy: Please start another eVisit.  If you have a serious health problem (like cancer, heart failure, an organ transplant or kidney disease): Call your specialty clinic. Let them know that you might have COVID-19.    Where can I get more information?  [facility] - About COVID-19: [email or web address]  CDC - What to Do If You're Sick: www.cdc.gov/coronavirus/2019-ncov/about/steps-when-sick.html  CDC - Ending Home Isolation: www.cdc.gov/coronavirus/2019-ncov/hcp/disposition-in-home-patients.html  CDC - Caring for Someone: www.cdc.gov/coronavirus/2019-ncov/if-you-are-sick/care-for-someone.html  [facility] clinical trials (COVID-19 research studies): [email or web address].[facility]-clinical-trials  Below are the COVID-19 hotlines at the [facility] ([facility]). Interpreters are available.  For health questions: Call 584-966-5315 or 1-221.338.6817 (7 a.m. to 7 p.m.)  For questions about schools and childcare: Call 334-844-0330 or 1-441.872.7751 (7 a.m. to 7 p.m.)

## 2021-01-27 ENCOUNTER — OFFICE VISIT (OUTPATIENT)
Dept: LAB | Facility: CLINIC | Age: 46
End: 2021-01-27
Attending: PHYSICIAN ASSISTANT
Payer: COMMERCIAL

## 2021-01-27 DIAGNOSIS — Z20.822 CLOSE EXPOSURE TO 2019 NOVEL CORONAVIRUS: ICD-10-CM

## 2021-01-27 LAB
LABORATORY COMMENT REPORT: NORMAL
SARS-COV-2 RNA RESP QL NAA+PROBE: NEGATIVE
SARS-COV-2 RNA RESP QL NAA+PROBE: NORMAL
SPECIMEN SOURCE: NORMAL
SPECIMEN SOURCE: NORMAL

## 2021-01-27 PROCEDURE — U0005 INFEC AGEN DETEC AMPLI PROBE: HCPCS | Performed by: PHYSICIAN ASSISTANT

## 2021-01-27 PROCEDURE — U0003 INFECTIOUS AGENT DETECTION BY NUCLEIC ACID (DNA OR RNA); SEVERE ACUTE RESPIRATORY SYNDROME CORONAVIRUS 2 (SARS-COV-2) (CORONAVIRUS DISEASE [COVID-19]), AMPLIFIED PROBE TECHNIQUE, MAKING USE OF HIGH THROUGHPUT TECHNOLOGIES AS DESCRIBED BY CMS-2020-01-R: HCPCS | Performed by: PHYSICIAN ASSISTANT

## 2021-02-24 ENCOUNTER — OFFICE VISIT (OUTPATIENT)
Dept: FAMILY MEDICINE | Facility: CLINIC | Age: 46
End: 2021-02-24
Payer: COMMERCIAL

## 2021-02-24 VITALS
TEMPERATURE: 99.4 F | DIASTOLIC BLOOD PRESSURE: 72 MMHG | SYSTOLIC BLOOD PRESSURE: 128 MMHG | BODY MASS INDEX: 34.29 KG/M2 | WEIGHT: 218.5 LBS | HEART RATE: 89 BPM | HEIGHT: 67 IN | OXYGEN SATURATION: 95 %

## 2021-02-24 DIAGNOSIS — J45.30 MILD PERSISTENT ASTHMA WITHOUT COMPLICATION: ICD-10-CM

## 2021-02-24 DIAGNOSIS — Z00.00 ROUTINE GENERAL MEDICAL EXAMINATION AT A HEALTH CARE FACILITY: Primary | ICD-10-CM

## 2021-02-24 DIAGNOSIS — Z00.00 ROUTINE GENERAL MEDICAL EXAMINATION AT A HEALTH CARE FACILITY: ICD-10-CM

## 2021-02-24 DIAGNOSIS — B35.1 ONYCHOMYCOSIS: ICD-10-CM

## 2021-02-24 DIAGNOSIS — Z13.220 SCREENING CHOLESTEROL LEVEL: ICD-10-CM

## 2021-02-24 PROCEDURE — 99213 OFFICE O/P EST LOW 20 MIN: CPT | Mod: 25 | Performed by: NURSE PRACTITIONER

## 2021-02-24 PROCEDURE — 90732 PPSV23 VACC 2 YRS+ SUBQ/IM: CPT | Performed by: NURSE PRACTITIONER

## 2021-02-24 PROCEDURE — 90471 IMMUNIZATION ADMIN: CPT | Performed by: NURSE PRACTITIONER

## 2021-02-24 PROCEDURE — 90472 IMMUNIZATION ADMIN EACH ADD: CPT | Performed by: NURSE PRACTITIONER

## 2021-02-24 PROCEDURE — 80053 COMPREHEN METABOLIC PANEL: CPT | Performed by: NURSE PRACTITIONER

## 2021-02-24 PROCEDURE — 36415 COLL VENOUS BLD VENIPUNCTURE: CPT | Performed by: NURSE PRACTITIONER

## 2021-02-24 PROCEDURE — 80061 LIPID PANEL: CPT | Performed by: NURSE PRACTITIONER

## 2021-02-24 PROCEDURE — 99396 PREV VISIT EST AGE 40-64: CPT | Mod: 25 | Performed by: NURSE PRACTITIONER

## 2021-02-24 PROCEDURE — 90686 IIV4 VACC NO PRSV 0.5 ML IM: CPT | Performed by: NURSE PRACTITIONER

## 2021-02-24 RX ORDER — TERBINAFINE HYDROCHLORIDE 250 MG/1
250 TABLET ORAL DAILY
Qty: 90 TABLET | Refills: 0 | Status: SHIPPED | OUTPATIENT
Start: 2021-02-24 | End: 2021-05-25

## 2021-02-24 RX ORDER — FLUTICASONE PROPIONATE 44 UG/1
1 AEROSOL, METERED RESPIRATORY (INHALATION) 2 TIMES DAILY
Qty: 10.6 G | Refills: 11 | Status: SHIPPED | OUTPATIENT
Start: 2021-02-24 | End: 2021-05-07

## 2021-02-24 ASSESSMENT — ASTHMA QUESTIONNAIRES
QUESTION_4 LAST FOUR WEEKS HOW OFTEN HAVE YOU USED YOUR RESCUE INHALER OR NEBULIZER MEDICATION (SUCH AS ALBUTEROL): TWO OR THREE TIMES PER WEEK
QUESTION_2 LAST FOUR WEEKS HOW OFTEN HAVE YOU HAD SHORTNESS OF BREATH: ONCE A DAY
QUESTION_1 LAST FOUR WEEKS HOW MUCH OF THE TIME DID YOUR ASTHMA KEEP YOU FROM GETTING AS MUCH DONE AT WORK, SCHOOL OR AT HOME: ALL OF THE TIME
QUESTION_3 LAST FOUR WEEKS HOW OFTEN DID YOUR ASTHMA SYMPTOMS (WHEEZING, COUGHING, SHORTNESS OF BREATH, CHEST TIGHTNESS OR PAIN) WAKE YOU UP AT NIGHT OR EARLIER THAN USUAL IN THE MORNING: NOT AT ALL
QUESTION_5 LAST FOUR WEEKS HOW WOULD YOU RATE YOUR ASTHMA CONTROL: SOMEWHAT CONTROLLED
ACT_TOTALSCORE: 14

## 2021-02-24 ASSESSMENT — MIFFLIN-ST. JEOR: SCORE: 1851.12

## 2021-02-24 NOTE — PROGRESS NOTES
3  SUBJECTIVE:   CC: Gerry Palafox is an 45 year old male who presents for preventive health visit.     }  Patient has been advised of split billing requirements and indicates understanding: Yes  Healthy Habits:    Do you get at least three servings of calcium containing foods daily (dairy, green leafy vegetables, etc.)? yes    Amount of exercise or daily activities, outside of work: none    Problems taking medications regularly Yes , not remember    Medication side effects: Yes , rash or hives to penecillin    Have you had an eye exam in the past two years? yes    Do you see a dentist twice per year? yes    Do you have sleep apnea, excessive snoring or daytime drowsiness?yes, sleep apnea    Has noticed toenail fungus and thickening of nail of big toe; would like to try some medication for it. No current pain of nail. Has some intermittent pain of both feet due to long hours working at [employer]; has previously tried inserts but they seemed to make issue worse.   Has noticed some recent weight gain; walking a lot everyday; drinking beer most days of the week. Feesls that cutting down would be beneficial for his weight in particular.  Has been having more difficulty with breathing while at work; Has been forgetting to take singulair; would like to replace with another inhaler to reduce exacerbations at work. 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   Asthma Follow-Up    Was ACT completed today?    Yes    ACT Total Scores 2/24/2021   ACT TOTAL SCORE -   ASTHMA ER VISITS -   ASTHMA HOSPITALIZATIONS -   ACT TOTAL SCORE (Goal Greater than or Equal to 20) 14   In the past 12 months, how many times did you visit the emergency room for your asthma without being admitted to the hospital? 0   In the past 12 months, how many times were you hospitalized overnight because of your asthma? 0       How many days per week do you miss taking your asthma controller medication?  2    Please describe any recent triggers  for your asthma: strong odors and fumes    Have you had any Emergency Room Visits, Urgent Care Visits, or Hospital Admissions since your last office visit?  No      Today's PHQ-2 Score:   PHQ-2 ( 1999 Pfizer) 2/24/2021 5/29/2018   Q1: Little interest or pleasure in doing things 0 0   Q2: Feeling down, depressed or hopeless 0 0   PHQ-2 Score 0 0       Abuse: Current or Past(Physical, Sexual or Emotional)- No  Do you feel safe in your environment? Yes    Have you ever done Advance Care Planning? (For example, a Health Directive, POLST, or a discussion with a medical provider or your loved ones about your wishes): Yes, advance care planning is on file.    Social History     Tobacco Use     Smoking status: Never Smoker     Smokeless tobacco: Never Used   Substance Use Topics     Alcohol use: Yes     Comment: varies     If you drink alcohol do you typically have >3 drinks per day or >7 drinks per week? Yes - AUDIT SCORE:                           Last PSA: No results found for: PSA    Reviewed orders with patient. Reviewed health maintenance and updated orders accordingly - Yes  Lab work is in process    Reviewed and updated as needed this visit by clinical staff  Tobacco   Meds              Reviewed and updated as needed this visit by Provider                    ROS:  CONSTITUTIONAL: NEGATIVE for fever, chills, change in weight  INTEGUMENTARY/SKIN: NEGATIVE for worrisome rashes, moles or lesions  EYES: NEGATIVE for vision changes or irritation  ENT: NEGATIVE for ear, mouth and throat problems  RESP: NEGATIVE for significant cough or SOB  CV: NEGATIVE for chest pain, palpitations or peripheral edema  GI: NEGATIVE for nausea, abdominal pain, heartburn, or change in bowel habits   male: negative for dysuria, hematuria, decreased urinary stream, erectile dysfunction, urethral discharge  MUSCULOSKELETAL: NEGATIVE for significant arthralgias or myalgia  NEURO: NEGATIVE for weakness, dizziness or paresthesias  PSYCHIATRIC:  "NEGATIVE for changes in mood or affect    OBJECTIVE:   /72 (BP Location: Right arm, Patient Position: Sitting, Cuff Size: Adult Large)   Pulse 89   Temp 99.4  F (37.4  C) (Temporal)   Ht 1.712 m (5' 7.4\")   Wt 99.1 kg (218 lb 8 oz)   SpO2 95%   BMI 33.81 kg/m    EXAM:  GENERAL: healthy, alert and no distress  EYES: Eyes grossly normal to inspection, PERRL and conjunctivae and sclerae normal  HENT: ear canals and TM's normal, nose and mouth without ulcers or lesions  NECK: no adenopathy, no asymmetry, masses, or scars and thyroid normal to palpation  RESP: lungs clear to auscultation - no rales, rhonchi or wheezes  CV: regular rate and rhythm, normal S1 S2, no S3 or S4, no murmur, click or rub, no peripheral edema and peripheral pulses strong  ABDOMEN: soft, nontender, no hepatosplenomegaly, no masses and bowel sounds normal  MS: no gross musculoskeletal defects noted, no edema  SKIN: no suspicious lesions or rashes  PSYCH: mentation appears normal, affect normal/bright    Diagnostic Test Results:  Results for orders placed or performed in visit on 02/24/21   **Comprehensive metabolic panel FUTURE anytime     Status: None   Result Value Ref [place]    Sodium 136 133 - 144 mmol/L    Potassium 4.0 3.4 - 5.3 mmol/L    Chloride 106 94 - 109 mmol/L    Carbon Dioxide 25 20 - 32 mmol/L    Anion Gap 5 3 - 14 mmol/L    Glucose 88 70 - 99 mg/dL    Urea Nitrogen 15 7 - 30 mg/dL    Creatinine 0.74 0.66 - 1.25 mg/dL    GFR Estimate >90 >60 mL/min/[1.73_m2]    GFR Estimate If Black >90 >60 mL/min/[1.73_m2]    Calcium 9.8 8.5 - 10.1 mg/dL    Bilirubin Total 0.8 0.2 - 1.3 mg/dL    Albumin 4.2 3.4 - 5.0 g/dL    Protein Total 8.4 6.8 - 8.8 g/dL    Alkaline Phosphatase 66 40 - 150 U/L    ALT 51 0 - 70 U/L    AST 26 0 - 45 U/L   Lipid panel reflex to direct LDL Fasting     Status: Abnormal   Result Value Ref [place]    Cholesterol 286 (H) <200 mg/dL    Triglycerides 347 (H) <150 mg/dL    HDL Cholesterol 48 >39 mg/dL    LDL " "Cholesterol Calculated 169 (H) <100 mg/dL    Non HDL Cholesterol 238 (H) <130 mg/dL       ASSESSMENT/PLAN:   1. Routine general medical examination at a health care facility  -discussed reduce drinking; follow up to discuss  2. Onychomycosis    - terbinafine (LAMISIL) 250 MG tablet; Take 1 tablet (250 mg) by mouth daily  Dispense: 90 tablet; Refill: 0    3. Screening cholesterol level    - Lipid panel reflex to direct LDL Fasting; Future    4. Mild persistent asthma without complication  Add Flovent today  - fluticasone (FLOVENT HFA) 44 MCG/ACT inhaler; Inhale 1 puff into the lungs 2 times daily  Dispense: 10.6 g; Refill: 11  - **Comprehensive metabolic panel FUTURE anytime; Future      Patient has been advised of split billing requirements and indicates understanding: Yes  COUNSELING:  Reviewed preventive health counseling, as reflected in patient instructions       Regular exercise       Healthy diet/nutrition       Alcohol Use     Estimated body mass index is 33.81 kg/m  as calculated from the following:    Height as of this encounter: 1.712 m (5' 7.4\").    Weight as of this encounter: 99.1 kg (218 lb 8 oz).    Weight management plan: Discussed healthy diet and exercise guidelines    He reports that he has never smoked. He has never used smokeless tobacco.      Counseling Resources:  ATP IV Guidelines  Pooled Cohorts Equation Calculator  FRAX Risk Assessment  ICSI Preventive Guidelines  Dietary Guidelines for Americans, 2010  USDA's MyPlate  ASA Prophylaxis  Lung CA Screening    CHARO Shepard CNP  [facility]  "

## 2021-02-25 LAB
ALBUMIN SERPL-MCNC: 4.2 G/DL (ref 3.4–5)
ALP SERPL-CCNC: 66 U/L (ref 40–150)
ALT SERPL W P-5'-P-CCNC: 51 U/L (ref 0–70)
ANION GAP SERPL CALCULATED.3IONS-SCNC: 5 MMOL/L (ref 3–14)
AST SERPL W P-5'-P-CCNC: 26 U/L (ref 0–45)
BILIRUB SERPL-MCNC: 0.8 MG/DL (ref 0.2–1.3)
BUN SERPL-MCNC: 15 MG/DL (ref 7–30)
CALCIUM SERPL-MCNC: 9.8 MG/DL (ref 8.5–10.1)
CHLORIDE SERPL-SCNC: 106 MMOL/L (ref 94–109)
CHOLEST SERPL-MCNC: 286 MG/DL
CO2 SERPL-SCNC: 25 MMOL/L (ref 20–32)
CREAT SERPL-MCNC: 0.74 MG/DL (ref 0.66–1.25)
GFR SERPL CREATININE-BSD FRML MDRD: >90 ML/MIN/{1.73_M2}
GLUCOSE SERPL-MCNC: 88 MG/DL (ref 70–99)
HDLC SERPL-MCNC: 48 MG/DL
LDLC SERPL CALC-MCNC: 169 MG/DL
NONHDLC SERPL-MCNC: 238 MG/DL
POTASSIUM SERPL-SCNC: 4 MMOL/L (ref 3.4–5.3)
PROT SERPL-MCNC: 8.4 G/DL (ref 6.8–8.8)
SODIUM SERPL-SCNC: 136 MMOL/L (ref 133–144)
TRIGL SERPL-MCNC: 347 MG/DL

## 2021-02-25 ASSESSMENT — ASTHMA QUESTIONNAIRES: ACT_TOTALSCORE: 14

## 2021-02-26 ENCOUNTER — MYC MEDICAL ADVICE (OUTPATIENT)
Dept: FAMILY MEDICINE | Facility: CLINIC | Age: 46
End: 2021-02-26

## 2021-03-01 DIAGNOSIS — J45.20 MILD INTERMITTENT ASTHMA, UNCOMPLICATED: ICD-10-CM

## 2021-03-03 ENCOUNTER — MYC MEDICAL ADVICE (OUTPATIENT)
Dept: FAMILY MEDICINE | Facility: CLINIC | Age: 46
End: 2021-03-03

## 2021-03-03 RX ORDER — ALBUTEROL SULFATE 90 UG/1
1-2 AEROSOL, METERED RESPIRATORY (INHALATION) EVERY 4 HOURS PRN
Qty: 8.5 G | Refills: 0 | Status: SHIPPED | OUTPATIENT
Start: 2021-03-03 | End: 2021-05-07

## 2021-03-04 NOTE — TELEPHONE ENCOUNTER
[employer] message sent to pt.    Gaye Gonzalez RN  [facility]

## 2021-05-07 DIAGNOSIS — Z00.00 ROUTINE GENERAL MEDICAL EXAMINATION AT A HEALTH CARE FACILITY: ICD-10-CM

## 2021-05-07 DIAGNOSIS — J45.20 MILD INTERMITTENT ASTHMA, UNCOMPLICATED: ICD-10-CM

## 2021-05-07 RX ORDER — ALBUTEROL SULFATE 90 UG/1
1-2 AEROSOL, METERED RESPIRATORY (INHALATION) EVERY 4 HOURS PRN
Qty: 8.5 G | Refills: 0 | Status: SHIPPED | OUTPATIENT
Start: 2021-05-07 | End: 2024-05-15

## 2021-05-07 RX ORDER — FLUTICASONE PROPIONATE 44 UG/1
1 AEROSOL, METERED RESPIRATORY (INHALATION) 2 TIMES DAILY
Qty: 10.6 G | Refills: 11 | Status: SHIPPED | OUTPATIENT
Start: 2021-05-07 | End: 2022-06-29

## 2021-05-07 NOTE — TELEPHONE ENCOUNTER
Reason for Call:  Medication or medication refill:    Do you use a [facility]?  Name of the pharmacy and phone number for the current request:  [id number] - [place], [place] - [address]  [phone]    Name of the medication requested: fluticasone (FLOVENT HFA) 44 MCG/ACT inhaler  albuterol (PROAIR HFA/PROVENTIL HFA/VENTOLIN HFA) 108 (90 Base) MCG/ACT inhaler    Other request: Patient needs refill on these medications. Would like it sent to new pharmacy attached to encounter    Can we leave a detailed message on this number? YES    Phone number patient can be reached at: Cell number on file:    Telephone Information:   Mobile [phone]       Best Time: any    Call taken on 5/7/2021 at 10:42 AM by Bharti Figueredo

## 2021-05-12 ENCOUNTER — APPOINTMENT (OUTPATIENT)
Dept: URBAN - METROPOLITAN AREA CLINIC 260 | Age: 46
Setting detail: DERMATOLOGY
End: 2021-05-14

## 2021-05-12 VITALS — HEIGHT: 66 IN | WEIGHT: 215 LBS

## 2021-05-12 DIAGNOSIS — L57.0 ACTINIC KERATOSIS: ICD-10-CM

## 2021-05-12 DIAGNOSIS — L70.0 ACNE VULGARIS: ICD-10-CM

## 2021-05-12 PROCEDURE — 99204 OFFICE O/P NEW MOD 45 MIN: CPT | Mod: 25

## 2021-05-12 PROCEDURE — OTHER LIQUID NITROGEN: OTHER

## 2021-05-12 PROCEDURE — OTHER COUNSELING: OTHER

## 2021-05-12 PROCEDURE — OTHER PRESCRIPTION: OTHER

## 2021-05-12 PROCEDURE — OTHER ADDITIONAL NOTES: OTHER

## 2021-05-12 PROCEDURE — 17000 DESTRUCT PREMALG LESION: CPT

## 2021-05-12 RX ORDER — CLINDAMYCIN PHOSPHATE 10 MG/ML
SOLUTION TOPICAL BID
Qty: 2 | Refills: 2 | Status: ERX | COMMUNITY
Start: 2021-05-12

## 2021-05-12 ASSESSMENT — LOCATION SIMPLE DESCRIPTION DERM
LOCATION SIMPLE: RIGHT CHEEK
LOCATION SIMPLE: RIGHT NOSE

## 2021-05-12 ASSESSMENT — LOCATION DETAILED DESCRIPTION DERM
LOCATION DETAILED: RIGHT INFERIOR CENTRAL MALAR CHEEK
LOCATION DETAILED: RIGHT NASAL SIDEWALL

## 2021-05-12 ASSESSMENT — LOCATION ZONE DERM
LOCATION ZONE: NOSE
LOCATION ZONE: FACE

## 2021-05-12 NOTE — PROCEDURE: LIQUID NITROGEN
Render Note In Bullet Format When Appropriate: No
Duration Of Freeze Thaw-Cycle (Seconds): 5
Post-Care Instructions: I reviewed with the patient in detail post-care instructions. Patient is to wear sunprotection, and avoid picking at any of the treated lesions. Pt may apply Vaseline to crusted or scabbing areas.
Number Of Freeze-Thaw Cycles: 2 freeze-thaw cycles
Consent: The patient's consent was obtained including but not limited to risks of crusting, scabbing, blistering, scarring, darker or lighter pigmentary change, recurrence, incomplete removal and infection.
Render Post-Care Instructions In Note?: yes
Detail Level: Detailed

## 2021-05-12 NOTE — PROCEDURE: COUNSELING
Sarecycline Pregnancy And Lactation Text: This medication is Pregnancy Category D and not consider safe during pregnancy. It is also excreted in breast milk.
Sarecycline Counseling: Patient advised regarding possible photosensitivity and discoloration of the teeth, skin, lips, tongue and gums.  Patient instructed to avoid sunlight, if possible.  When exposed to sunlight, patients should wear protective clothing, sunglasses, and sunscreen.  The patient was instructed to call the office immediately if the following severe adverse effects occur:  hearing changes, easy bruising/bleeding, severe headache, or vision changes.  The patient verbalized understanding of the proper use and possible adverse effects of sarecycline.  All of the patient's questions and concerns were addressed.
Doxycycline Pregnancy And Lactation Text: This medication is Pregnancy Category D and not consider safe during pregnancy. It is also excreted in breast milk but is considered safe for shorter treatment courses.
Isotretinoin Pregnancy And Lactation Text: This medication is Pregnancy Category X and is considered extremely dangerous during pregnancy. It is unknown if it is excreted in breast milk.
Minocycline Counseling: Patient advised regarding possible photosensitivity and discoloration of the teeth, skin, lips, tongue and gums.  Patient instructed to avoid sunlight, if possible.  When exposed to sunlight, patients should wear protective clothing, sunglasses, and sunscreen.  The patient was instructed to call the office immediately if the following severe adverse effects occur:  hearing changes, easy bruising/bleeding, severe headache, or vision changes.  The patient verbalized understanding of the proper use and possible adverse effects of minocycline.  All of the patient's questions and concerns were addressed.
Doxycycline Counseling:  Patient counseled regarding possible photosensitivity and increased risk for sunburn.  Patient instructed to avoid sunlight, if possible.  When exposed to sunlight, patients should wear protective clothing, sunglasses, and sunscreen.  The patient was instructed to call the office immediately if the following severe adverse effects occur:  hearing changes, easy bruising/bleeding, severe headache, or vision changes.  The patient verbalized understanding of the proper use and possible adverse effects of doxycycline.  All of the patient's questions and concerns were addressed.
Dapsone Pregnancy And Lactation Text: This medication is Pregnancy Category C and is not considered safe during pregnancy or breast feeding.
Birth Control Pills Pregnancy And Lactation Text: This medication should be avoided if pregnant and for the first 30 days post-partum.
Topical Clindamycin Counseling: Patient counseled that this medication may cause skin irritation or allergic reactions.  In the event of skin irritation, the patient was advised to reduce the amount of the drug applied or use it less frequently.   The patient verbalized understanding of the proper use and possible adverse effects of clindamycin.  All of the patient's questions and concerns were addressed.
Detail Level: Zone
Erythromycin Pregnancy And Lactation Text: This medication is Pregnancy Category B and is considered safe during pregnancy. It is also excreted in breast milk.
Tetracycline Counseling: Patient counseled regarding possible photosensitivity and increased risk for sunburn.  Patient instructed to avoid sunlight, if possible.  When exposed to sunlight, patients should wear protective clothing, sunglasses, and sunscreen.  The patient was instructed to call the office immediately if the following severe adverse effects occur:  hearing changes, easy bruising/bleeding, severe headache, or vision changes.  The patient verbalized understanding of the proper use and possible adverse effects of tetracycline.  All of the patient's questions and concerns were addressed. Patient understands to avoid pregnancy while on therapy due to potential birth defects.
Spironolactone Pregnancy And Lactation Text: This medication can cause feminization of the male fetus and should be avoided during pregnancy. The active metabolite is also found in breast milk.
Dapsone Counseling: I discussed with the patient the risks of dapsone including but not limited to hemolytic anemia, agranulocytosis, rashes, methemoglobinemia, kidney failure, peripheral neuropathy, headaches, GI upset, and liver toxicity.  Patients who start dapsone require monitoring including baseline LFTs and weekly CBCs for the first month, then every month thereafter.  The patient verbalized understanding of the proper use and possible adverse effects of dapsone.  All of the patient's questions and concerns were addressed.
Bactrim Counseling:  I discussed with the patient the risks of sulfa antibiotics including but not limited to GI upset, allergic reaction, drug rash, diarrhea, dizziness, photosensitivity, and yeast infections.  Rarely, more serious reactions can occur including but not limited to aplastic anemia, agranulocytosis, methemoglobinemia, blood dyscrasias, liver or kidney failure, lung infiltrates or desquamative/blistering drug rashes.
Topical Retinoid Pregnancy And Lactation Text: This medication is Pregnancy Category C. It is unknown if this medication is excreted in breast milk.
Topical Sulfur Applications Pregnancy And Lactation Text: This medication is Pregnancy Category C and has an unknown safety profile during pregnancy. It is unknown if this topical medication is excreted in breast milk.
Topical Sulfur Applications Counseling: Topical Sulfur Counseling: Patient counseled that this medication may cause skin irritation or allergic reactions.  In the event of skin irritation, the patient was advised to reduce the amount of the drug applied or use it less frequently.   The patient verbalized understanding of the proper use and possible adverse effects of topical sulfur application.  All of the patient's questions and concerns were addressed.
Azithromycin Counseling:  I discussed with the patient the risks of azithromycin including but not limited to GI upset, allergic reaction, drug rash, diarrhea, and yeast infections.
Topical Clindamycin Pregnancy And Lactation Text: This medication is Pregnancy Category B and is considered safe during pregnancy. It is unknown if it is excreted in breast milk.
Azithromycin Pregnancy And Lactation Text: This medication is considered safe during pregnancy and is also secreted in breast milk.
Birth Control Pills Counseling: Birth Control Pill Counseling: I discussed with the patient the potential side effects of OCPs including but not limited to increased risk of stroke, heart attack, thrombophlebitis, deep venous thrombosis, hepatic adenomas, breast changes, GI upset, headaches, and depression.  The patient verbalized understanding of the proper use and possible adverse effects of OCPs. All of the patient's questions and concerns were addressed.
Erythromycin Counseling:  I discussed with the patient the risks of erythromycin including but not limited to GI upset, allergic reaction, drug rash, diarrhea, increase in liver enzymes, and yeast infections.
Bactrim Pregnancy And Lactation Text: This medication is Pregnancy Category D and is known to cause fetal risk.  It is also excreted in breast milk.
Spironolactone Counseling: Patient advised regarding risks of diarrhea, abdominal pain, hyperkalemia, birth defects (for female patients), liver toxicity and renal toxicity. The patient may need blood work to monitor liver and kidney function and potassium levels while on therapy. The patient verbalized understanding of the proper use and possible adverse effects of spironolactone.  All of the patient's questions and concerns were addressed.
Benzoyl Peroxide Counseling: Patient counseled that medicine may cause skin irritation and bleach clothing.  In the event of skin irritation, the patient was advised to reduce the amount of the drug applied or use it less frequently.   The patient verbalized understanding of the proper use and possible adverse effects of benzoyl peroxide.  All of the patient's questions and concerns were addressed.
Tazorac Counseling:  Patient advised that medication is irritating and drying.  Patient may need to apply sparingly and wash off after an hour before eventually leaving it on overnight.  The patient verbalized understanding of the proper use and possible adverse effects of tazorac.  All of the patient's questions and concerns were addressed.
Tazorac Pregnancy And Lactation Text: This medication is not safe during pregnancy. It is unknown if this medication is excreted in breast milk.
Use Enhanced Medication Counseling?: No
High Dose Vitamin A Counseling: Side effects reviewed, pt to contact office should one occur.
Benzoyl Peroxide Pregnancy And Lactation Text: This medication is Pregnancy Category C. It is unknown if benzoyl peroxide is excreted in breast milk.
Topical Retinoid counseling:  Patient advised to apply a pea-sized amount only at bedtime and wait 30 minutes after washing their face before applying.  If too drying, patient may add a non-comedogenic moisturizer. The patient verbalized understanding of the proper use and possible adverse effects of retinoids.  All of the patient's questions and concerns were addressed.
High Dose Vitamin A Pregnancy And Lactation Text: High dose vitamin A therapy is contraindicated during pregnancy and breast feeding.
Isotretinoin Counseling: Patient should get monthly blood tests, not donate blood, not drive at night if vision affected, not share medication, and not undergo elective surgery for 6 months after tx completed. Side effects reviewed, pt to contact office should one occur.

## 2021-05-12 NOTE — PROCEDURE: ADDITIONAL NOTES
Detail Level: Simple
Additional Notes: Cryotherapy after care sheet given to patient. Scheduling a full body skin exam in the next 6 months is recommended.
Render Risk Assessment In Note?: no

## 2021-06-03 ENCOUNTER — TRANSFERRED RECORDS (OUTPATIENT)
Dept: HEALTH INFORMATION MANAGEMENT | Facility: CLINIC | Age: 46
End: 2021-06-03

## 2021-07-14 ENCOUNTER — APPOINTMENT (OUTPATIENT)
Dept: URBAN - METROPOLITAN AREA CLINIC 260 | Age: 46
Setting detail: DERMATOLOGY
End: 2021-07-15

## 2021-07-14 VITALS — WEIGHT: 215 LBS | HEIGHT: 66 IN

## 2021-07-14 DIAGNOSIS — D18.0 HEMANGIOMA: ICD-10-CM

## 2021-07-14 DIAGNOSIS — D22 MELANOCYTIC NEVI: ICD-10-CM

## 2021-07-14 DIAGNOSIS — Z71.89 OTHER SPECIFIED COUNSELING: ICD-10-CM

## 2021-07-14 DIAGNOSIS — L57.0 ACTINIC KERATOSIS: ICD-10-CM

## 2021-07-14 DIAGNOSIS — L82.0 INFLAMED SEBORRHEIC KERATOSIS: ICD-10-CM

## 2021-07-14 DIAGNOSIS — L81.4 OTHER MELANIN HYPERPIGMENTATION: ICD-10-CM

## 2021-07-14 DIAGNOSIS — L70.0 ACNE VULGARIS: ICD-10-CM

## 2021-07-14 PROBLEM — D22.5 MELANOCYTIC NEVI OF TRUNK: Status: ACTIVE | Noted: 2021-07-14

## 2021-07-14 PROBLEM — D18.01 HEMANGIOMA OF SKIN AND SUBCUTANEOUS TISSUE: Status: ACTIVE | Noted: 2021-07-14

## 2021-07-14 PROCEDURE — OTHER COUNSELING: OTHER

## 2021-07-14 PROCEDURE — 99213 OFFICE O/P EST LOW 20 MIN: CPT

## 2021-07-14 PROCEDURE — OTHER ADDITIONAL NOTES: OTHER

## 2021-07-14 ASSESSMENT — LOCATION ZONE DERM
LOCATION ZONE: TRUNK
LOCATION ZONE: FACE
LOCATION ZONE: NOSE

## 2021-07-14 ASSESSMENT — LOCATION DETAILED DESCRIPTION DERM
LOCATION DETAILED: RIGHT SUPERIOR UPPER BACK
LOCATION DETAILED: LEFT INFERIOR CENTRAL MALAR CHEEK
LOCATION DETAILED: NASAL DORSUM
LOCATION DETAILED: INFERIOR THORACIC SPINE

## 2021-07-14 ASSESSMENT — LOCATION SIMPLE DESCRIPTION DERM
LOCATION SIMPLE: UPPER BACK
LOCATION SIMPLE: LEFT CHEEK
LOCATION SIMPLE: RIGHT UPPER BACK
LOCATION SIMPLE: NOSE

## 2021-07-14 ASSESSMENT — SEVERITY ASSESSMENT OVERALL AMONG ALL PATIENTS
IN YOUR EXPERIENCE, AMONG ALL PATIENTS YOU HAVE SEEN WITH THIS CONDITION, HOW SEVERE IS THIS PATIENT'S CONDITION?: ALMOST CLEAR

## 2021-07-14 NOTE — PROCEDURE: COUNSELING
Detail Level: Detailed
Detail Level: Zone
Bactrim Counseling:  I discussed with the patient the risks of sulfa antibiotics including but not limited to GI upset, allergic reaction, drug rash, diarrhea, dizziness, photosensitivity, and yeast infections.  Rarely, more serious reactions can occur including but not limited to aplastic anemia, agranulocytosis, methemoglobinemia, blood dyscrasias, liver or kidney failure, lung infiltrates or desquamative/blistering drug rashes.
Benzoyl Peroxide Pregnancy And Lactation Text: This medication is Pregnancy Category C. It is unknown if benzoyl peroxide is excreted in breast milk.
Include Pregnancy/Lactation Warning?: No
Spironolactone Counseling: Patient advised regarding risks of diarrhea, abdominal pain, hyperkalemia, birth defects (for female patients), liver toxicity and renal toxicity. The patient may need blood work to monitor liver and kidney function and potassium levels while on therapy. The patient verbalized understanding of the proper use and possible adverse effects of spironolactone.  All of the patient's questions and concerns were addressed.
Isotretinoin Pregnancy And Lactation Text: This medication is Pregnancy Category X and is considered extremely dangerous during pregnancy. It is unknown if it is excreted in breast milk.
Tetracycline Pregnancy And Lactation Text: This medication is Pregnancy Category D and not consider safe during pregnancy. It is also excreted in breast milk.
Dapsone Counseling: I discussed with the patient the risks of dapsone including but not limited to hemolytic anemia, agranulocytosis, rashes, methemoglobinemia, kidney failure, peripheral neuropathy, headaches, GI upset, and liver toxicity.  Patients who start dapsone require monitoring including baseline LFTs and weekly CBCs for the first month, then every month thereafter.  The patient verbalized understanding of the proper use and possible adverse effects of dapsone.  All of the patient's questions and concerns were addressed.
Detail Level: Generalized
Tazorac Counseling:  Patient advised that medication is irritating and drying.  Patient may need to apply sparingly and wash off after an hour before eventually leaving it on overnight.  The patient verbalized understanding of the proper use and possible adverse effects of tazorac.  All of the patient's questions and concerns were addressed.
Benzoyl Peroxide Counseling: Patient counseled that medicine may cause skin irritation and bleach clothing.  In the event of skin irritation, the patient was advised to reduce the amount of the drug applied or use it less frequently.   The patient verbalized understanding of the proper use and possible adverse effects of benzoyl peroxide.  All of the patient's questions and concerns were addressed.
Sarecycline Counseling: Patient advised regarding possible photosensitivity and discoloration of the teeth, skin, lips, tongue and gums.  Patient instructed to avoid sunlight, if possible.  When exposed to sunlight, patients should wear protective clothing, sunglasses, and sunscreen.  The patient was instructed to call the office immediately if the following severe adverse effects occur:  hearing changes, easy bruising/bleeding, severe headache, or vision changes.  The patient verbalized understanding of the proper use and possible adverse effects of sarecycline.  All of the patient's questions and concerns were addressed.
Doxycycline Counseling:  Patient counseled regarding possible photosensitivity and increased risk for sunburn.  Patient instructed to avoid sunlight, if possible.  When exposed to sunlight, patients should wear protective clothing, sunglasses, and sunscreen.  The patient was instructed to call the office immediately if the following severe adverse effects occur:  hearing changes, easy bruising/bleeding, severe headache, or vision changes.  The patient verbalized understanding of the proper use and possible adverse effects of doxycycline.  All of the patient's questions and concerns were addressed.
Topical Sulfur Applications Counseling: Topical Sulfur Counseling: Patient counseled that this medication may cause skin irritation or allergic reactions.  In the event of skin irritation, the patient was advised to reduce the amount of the drug applied or use it less frequently.   The patient verbalized understanding of the proper use and possible adverse effects of topical sulfur application.  All of the patient's questions and concerns were addressed.
Isotretinoin Counseling: Patient should get monthly blood tests, not donate blood, not drive at night if vision affected, not share medication, and not undergo elective surgery for 6 months after tx completed. Side effects reviewed, pt to contact office should one occur.
Topical Clindamycin Pregnancy And Lactation Text: This medication is Pregnancy Category B and is considered safe during pregnancy. It is unknown if it is excreted in breast milk.
Azithromycin Pregnancy And Lactation Text: This medication is considered safe during pregnancy and is also secreted in breast milk.
Topical Clindamycin Counseling: Patient counseled that this medication may cause skin irritation or allergic reactions.  In the event of skin irritation, the patient was advised to reduce the amount of the drug applied or use it less frequently.   The patient verbalized understanding of the proper use and possible adverse effects of clindamycin.  All of the patient's questions and concerns were addressed.
Spironolactone Pregnancy And Lactation Text: This medication can cause feminization of the male fetus and should be avoided during pregnancy. The active metabolite is also found in breast milk.
Birth Control Pills Counseling: Birth Control Pill Counseling: I discussed with the patient the potential side effects of OCPs including but not limited to increased risk of stroke, heart attack, thrombophlebitis, deep venous thrombosis, hepatic adenomas, breast changes, GI upset, headaches, and depression.  The patient verbalized understanding of the proper use and possible adverse effects of OCPs. All of the patient's questions and concerns were addressed.
Erythromycin Counseling:  I discussed with the patient the risks of erythromycin including but not limited to GI upset, allergic reaction, drug rash, diarrhea, increase in liver enzymes, and yeast infections.
High Dose Vitamin A Counseling: Side effects reviewed, pt to contact office should one occur.
Tetracycline Counseling: Patient counseled regarding possible photosensitivity and increased risk for sunburn.  Patient instructed to avoid sunlight, if possible.  When exposed to sunlight, patients should wear protective clothing, sunglasses, and sunscreen.  The patient was instructed to call the office immediately if the following severe adverse effects occur:  hearing changes, easy bruising/bleeding, severe headache, or vision changes.  The patient verbalized understanding of the proper use and possible adverse effects of tetracycline.  All of the patient's questions and concerns were addressed. Patient understands to avoid pregnancy while on therapy due to potential birth defects.
Topical Retinoid counseling:  Patient advised to apply a pea-sized amount only at bedtime and wait 30 minutes after washing their face before applying.  If too drying, patient may add a non-comedogenic moisturizer. The patient verbalized understanding of the proper use and possible adverse effects of retinoids.  All of the patient's questions and concerns were addressed.
Erythromycin Pregnancy And Lactation Text: This medication is Pregnancy Category B and is considered safe during pregnancy. It is also excreted in breast milk.
Doxycycline Pregnancy And Lactation Text: This medication is Pregnancy Category D and not consider safe during pregnancy. It is also excreted in breast milk but is considered safe for shorter treatment courses.
Birth Control Pills Pregnancy And Lactation Text: This medication should be avoided if pregnant and for the first 30 days post-partum.
Dapsone Pregnancy And Lactation Text: This medication is Pregnancy Category C and is not considered safe during pregnancy or breast feeding.
Minocycline Counseling: Patient advised regarding possible photosensitivity and discoloration of the teeth, skin, lips, tongue and gums.  Patient instructed to avoid sunlight, if possible.  When exposed to sunlight, patients should wear protective clothing, sunglasses, and sunscreen.  The patient was instructed to call the office immediately if the following severe adverse effects occur:  hearing changes, easy bruising/bleeding, severe headache, or vision changes.  The patient verbalized understanding of the proper use and possible adverse effects of minocycline.  All of the patient's questions and concerns were addressed.
Azithromycin Counseling:  I discussed with the patient the risks of azithromycin including but not limited to GI upset, allergic reaction, drug rash, diarrhea, and yeast infections.
Bactrim Pregnancy And Lactation Text: This medication is Pregnancy Category D and is known to cause fetal risk.  It is also excreted in breast milk.
High Dose Vitamin A Pregnancy And Lactation Text: High dose vitamin A therapy is contraindicated during pregnancy and breast feeding.
Tazorac Pregnancy And Lactation Text: This medication is not safe during pregnancy. It is unknown if this medication is excreted in breast milk.
Topical Sulfur Applications Pregnancy And Lactation Text: This medication is Pregnancy Category C and has an unknown safety profile during pregnancy. It is unknown if this topical medication is excreted in breast milk.
Topical Retinoid Pregnancy And Lactation Text: This medication is Pregnancy Category C. It is unknown if this medication is excreted in breast milk.

## 2021-07-14 NOTE — PROCEDURE: ADDITIONAL NOTES
Render Risk Assessment In Note?: no
Detail Level: Simple
Additional Notes: [facility] will provide Acne refills for 1 year as requested.

## 2021-10-02 ENCOUNTER — HEALTH MAINTENANCE LETTER (OUTPATIENT)
Age: 46
End: 2021-10-02

## 2021-12-02 ENCOUNTER — IMMUNIZATION (OUTPATIENT)
Dept: NURSING | Facility: CLINIC | Age: 46
End: 2021-12-02
Payer: COMMERCIAL

## 2021-12-02 PROCEDURE — 0064A COVID-19,PF,MODERNA (18+ YRS BOOSTER .25ML): CPT

## 2021-12-02 PROCEDURE — 90686 IIV4 VACC NO PRSV 0.5 ML IM: CPT

## 2021-12-02 PROCEDURE — 90471 IMMUNIZATION ADMIN: CPT

## 2021-12-02 PROCEDURE — 91306 COVID-19,PF,MODERNA (18+ YRS BOOSTER .25ML): CPT

## 2021-12-21 ENCOUNTER — NURSE TRIAGE (OUTPATIENT)
Dept: NURSING | Facility: CLINIC | Age: 46
End: 2021-12-21
Payer: COMMERCIAL

## 2021-12-21 DIAGNOSIS — R05.9 COUGH: Primary | ICD-10-CM

## 2021-12-21 NOTE — TELEPHONE ENCOUNTER
RN Triage:    Was diagnosed with covid-19 on 11/4/21.  Illness ran it's course and he felt better for the last 5 weeks.    Came down with a scratchy throat, fatigue and mildly productive cough yesterday.  History of asthma and he has been needing to take asthma medications more since last night.  Usually uses inhaler prn; not on a daily basis.    Home care discussed.  He is wondering if he should be tested for Covid-19.    Question:  Would a PCR test be indicated now since he had the illness 6 weeks ago?  Would it come up positive even if he doesn't currently have the illness?  Should he act as though he does have active Covid illness?  He is awaiting a callback on 12/22/21.    Wanda Baca RN 12/21/21 5:37 PM  [facility] Nurse Advisor    Reason for Disposition    [1] COVID-19 infection suspected by caller or triager AND [2] mild symptoms (cough, fever, or others) AND [3] negative COVID-19 rapid test    Additional Information    Negative: SEVERE difficulty breathing (e.g., struggling for each breath, speaks in single words)    Negative: Difficult to awaken or acting confused (e.g., disoriented, slurred speech)    Negative: Bluish (or gray) lips or face now    Negative: Shock suspected (e.g., cold/pale/clammy skin, too weak to stand, low BP, rapid pulse)    Negative: Sounds like a life-threatening emergency to the triager    Negative: [1] COVID-19 exposure AND [2] no symptoms    Negative: COVID-19 vaccine reaction suspected (e.g., fever, headache, muscle aches) occurring 1 to 3 days after getting vaccine    Negative: COVID-19 vaccine, questions about    Negative: [1] Lives with someone known to have influenza (flu test positive) AND [2] flu-like symptoms (e.g., cough, runny nose, sore throat, SOB; with or without fever)    Negative: [1] Adult with possible COVID-19 symptoms AND [2] triager concerned about severity of symptoms or other causes    Negative: COVID-19 and breastfeeding, questions about     Negative: SEVERE or constant chest pain or pressure (Exception: mild central chest pain, present only when coughing)    Negative: MODERATE difficulty breathing (e.g., speaks in phrases, SOB even at rest, pulse 100-120)    Negative: [1] Headache AND [2] stiff neck (can't touch chin to chest)    Negative: MILD difficulty breathing (e.g., minimal/no SOB at rest, SOB with walking, pulse <100)    Negative: Chest pain or pressure    Negative: Patient sounds very sick or weak to the triager    Negative: Fever > 103 F (39.4 C)    Negative: [1] Fever > 101 F (38.3 C) AND [2] age > 60 years    Negative: [1] Fever > 100.0 F (37.8 C) AND [2] bedridden (e.g., nursing home patient, CVA, chronic illness, recovering from surgery)    Negative: HIGH RISK for severe COVID complications (e.g., age > 64 years, obesity with BMI > 25, pregnant, chronic lung disease or other chronic medical condition)  (Exception: Already seen by PCP and no new or worsening symptoms.)    Negative: [1] HIGH RISK patient AND [2] influenza is widespread in the community AND [3] ONE OR MORE respiratory symptoms: cough, sore throat, runny or stuffy nose    Negative: [1] HIGH RISK patient AND [2] influenza exposure within the last 7 days AND [3] ONE OR MORE respiratory symptoms: cough, sore throat, runny or stuffy nose    Protocols used: CORONAVIRUS (COVID-19) DIAGNOSED OR [id number] 8.25.2021

## 2021-12-22 NOTE — TELEPHONE ENCOUNTER
If he tested again now it's unlikely that he would still be positive from is infection 6 weeks ago. I think it's okay to test now to gather more information.

## 2022-05-14 ENCOUNTER — HEALTH MAINTENANCE LETTER (OUTPATIENT)
Age: 47
End: 2022-05-14

## 2022-06-28 DIAGNOSIS — Z00.00 ROUTINE GENERAL MEDICAL EXAMINATION AT A HEALTH CARE FACILITY: ICD-10-CM

## 2022-06-28 NOTE — TELEPHONE ENCOUNTER
"Requested Prescriptions   Pending Prescriptions Disp Refills     FLOVENT HFA 44 MCG/ACT inhaler [Pharmacy Med Name: Flovent HFA Inhalation Aerosol 44 MCG/ACT] 10.6 g 0     Sig: INHALE 1 PUFF INTO THE LUNGS TWICE DAILY       Inhaled Steroids Protocol Failed - 6/28/2022 12:27 PM        Failed - Asthma control assessment score within normal limits in last 6 months     Please review ACT score.           Failed - Recent (6 mo) or future (30 days) visit within the authorizing provider's specialty     Patient had office visit in the last 6 months or has a visit in the next 30 days with authorizing provider or within the authorizing provider's specialty.  See \"Patient Info\" tab in inbasket, or \"Choose Columns\" in Meds & Orders section of the refill encounter.            Passed - Patient is age 12 or older        Passed - Medication is active on med list             Routing refill request to provider for review/approval because:  Patient needs to be seen because it has been more than 1 year since last office visit. Last appt 2/24/21      Virgie Wilder RN  [facility]   "

## 2022-06-29 RX ORDER — FLUTICASONE PROPIONATE 44 MCG
AEROSOL WITH ADAPTER (GRAM) INHALATION
Qty: 10.6 G | Refills: 0 | Status: SHIPPED | OUTPATIENT
Start: 2022-06-29 | End: 2022-07-04

## 2022-07-01 DIAGNOSIS — Z00.00 ROUTINE GENERAL MEDICAL EXAMINATION AT A HEALTH CARE FACILITY: ICD-10-CM

## 2022-07-04 RX ORDER — FLUTICASONE PROPIONATE 44 MCG
AEROSOL WITH ADAPTER (GRAM) INHALATION
Qty: 10.6 G | Refills: 0 | Status: SHIPPED | OUTPATIENT
Start: 2022-07-04 | End: 2023-07-18

## 2022-09-03 ENCOUNTER — HEALTH MAINTENANCE LETTER (OUTPATIENT)
Age: 47
End: 2022-09-03

## 2022-10-04 ENCOUNTER — APPOINTMENT (OUTPATIENT)
Dept: URBAN - METROPOLITAN AREA CLINIC 260 | Age: 47
Setting detail: DERMATOLOGY
End: 2022-10-05

## 2022-10-04 VITALS — WEIGHT: 215 LBS | HEIGHT: 67 IN

## 2022-10-04 DIAGNOSIS — D22 MELANOCYTIC NEVI: ICD-10-CM

## 2022-10-04 DIAGNOSIS — D18.0 HEMANGIOMA: ICD-10-CM

## 2022-10-04 DIAGNOSIS — L663 OTHER SPECIFIED DISEASES OF HAIR AND HAIR FOLLICLES: ICD-10-CM

## 2022-10-04 DIAGNOSIS — L82.1 OTHER SEBORRHEIC KERATOSIS: ICD-10-CM

## 2022-10-04 DIAGNOSIS — Z71.89 OTHER SPECIFIED COUNSELING: ICD-10-CM

## 2022-10-04 DIAGNOSIS — L738 OTHER SPECIFIED DISEASES OF HAIR AND HAIR FOLLICLES: ICD-10-CM

## 2022-10-04 DIAGNOSIS — L64.8 OTHER ANDROGENIC ALOPECIA: ICD-10-CM

## 2022-10-04 DIAGNOSIS — L81.4 OTHER MELANIN HYPERPIGMENTATION: ICD-10-CM

## 2022-10-04 PROBLEM — D22.5 MELANOCYTIC NEVI OF TRUNK: Status: ACTIVE | Noted: 2022-10-04

## 2022-10-04 PROBLEM — L02.821 FURUNCLE OF HEAD [ANY PART, EXCEPT FACE]: Status: ACTIVE | Noted: 2022-10-04

## 2022-10-04 PROBLEM — D18.01 HEMANGIOMA OF SKIN AND SUBCUTANEOUS TISSUE: Status: ACTIVE | Noted: 2022-10-04

## 2022-10-04 PROCEDURE — 99214 OFFICE O/P EST MOD 30 MIN: CPT

## 2022-10-04 PROCEDURE — OTHER MIPS QUALITY: OTHER

## 2022-10-04 PROCEDURE — OTHER PRESCRIPTION MEDICATION MANAGEMENT: OTHER

## 2022-10-04 PROCEDURE — OTHER PRESCRIPTION: OTHER

## 2022-10-04 PROCEDURE — OTHER COUNSELING: OTHER

## 2022-10-04 RX ORDER — MINOCYCLINE HYDROCHLORIDE 100 MG/1
100 MG CAPSULE ORAL BID
Qty: 60 | Refills: 0 | Status: ERX | COMMUNITY
Start: 2022-10-04

## 2022-10-04 ASSESSMENT — LOCATION SIMPLE DESCRIPTION DERM
LOCATION SIMPLE: LEFT SCALP
LOCATION SIMPLE: POSTERIOR SCALP
LOCATION SIMPLE: UPPER BACK
LOCATION SIMPLE: RIGHT SCALP

## 2022-10-04 ASSESSMENT — SEVERITY ASSESSMENT: SEVERITY: MILD TO MODERATE

## 2022-10-04 ASSESSMENT — LOCATION DETAILED DESCRIPTION DERM
LOCATION DETAILED: POSTERIOR MID-PARIETAL SCALP
LOCATION DETAILED: INFERIOR THORACIC SPINE
LOCATION DETAILED: LEFT CENTRAL FRONTAL SCALP
LOCATION DETAILED: RIGHT CENTRAL FRONTAL SCALP

## 2022-10-04 ASSESSMENT — LOCATION ZONE DERM
LOCATION ZONE: TRUNK
LOCATION ZONE: SCALP

## 2022-10-04 ASSESSMENT — BSA RASH: BSA RASH: 2

## 2022-10-04 NOTE — PROCEDURE: PRESCRIPTION MEDICATION MANAGEMENT
Detail Level: Simple
Initiate Treatment: minocycline 100 mg capsule BID
Render In Strict Bullet Format?: No
Plan: Black box warning described for patient and risks/benefits of medication outlined. Finasteride offered as alternative including risks/benefits.  Patient preferred to try oral minoxidil.
Initiate Treatment: minoxidil 5mg daily by mouth

## 2022-10-04 NOTE — PROCEDURE: COUNSELING
Detail Level: Simple
Detail Level: Generalized
Detail Level: Zone
Detail Level: Detailed
Statement Selected

## 2022-10-05 RX ORDER — MINOXIDIL 2.5 MG/1
5MG TABLET ORAL QD
Qty: 60 | Refills: 3 | Status: ERX | COMMUNITY
Start: 2022-10-04

## 2023-01-26 ENCOUNTER — EXTERNAL ORDER RESULTS (OUTPATIENT)
Dept: ORTHOPEDICS | Facility: CLINIC | Age: 48
End: 2023-01-26

## 2023-01-26 ENCOUNTER — ANCILLARY PROCEDURE (OUTPATIENT)
Dept: GENERAL RADIOLOGY | Facility: CLINIC | Age: 48
End: 2023-01-26
Attending: STUDENT IN AN ORGANIZED HEALTH CARE EDUCATION/TRAINING PROGRAM
Payer: COMMERCIAL

## 2023-01-26 ENCOUNTER — PRE VISIT (OUTPATIENT)
Dept: ORTHOPEDICS | Facility: CLINIC | Age: 48
End: 2023-01-26

## 2023-01-26 ENCOUNTER — OFFICE VISIT (OUTPATIENT)
Dept: ORTHOPEDICS | Facility: CLINIC | Age: 48
End: 2023-01-26
Payer: COMMERCIAL

## 2023-01-26 DIAGNOSIS — M10.9 ACUTE GOUT INVOLVING TOE OF RIGHT FOOT, UNSPECIFIED CAUSE: Primary | ICD-10-CM

## 2023-01-26 DIAGNOSIS — M79.671 RIGHT FOOT PAIN: ICD-10-CM

## 2023-01-26 DIAGNOSIS — M79.671 RIGHT FOOT PAIN: Primary | ICD-10-CM

## 2023-01-26 PROCEDURE — 99204 OFFICE O/P NEW MOD 45 MIN: CPT | Performed by: STUDENT IN AN ORGANIZED HEALTH CARE EDUCATION/TRAINING PROGRAM

## 2023-01-26 PROCEDURE — 73630 X-RAY EXAM OF FOOT: CPT | Mod: RT | Performed by: RADIOLOGY

## 2023-01-26 RX ORDER — PREDNISONE 20 MG/1
40 TABLET ORAL DAILY
Qty: 10 TABLET | Refills: 0 | Status: SHIPPED | OUTPATIENT
Start: 2023-01-26 | End: 2023-01-31

## 2023-01-26 NOTE — LETTER
[facility] SPORTS MEDICINE CLINIC [address]  [address] [facility] [address]  [phone]          January 26, 2023    RE:  Gerry Palafox                                                                                                                                                       [address] [place] [address]            To whom it may concern:    Gerry Palafox is under my professional care for Acute gout involving toe of right foot, unspecified cause He  may return to work on 1/30/23    Sincerely,        Hal Torres DO

## 2023-01-26 NOTE — PROGRESS NOTES
[facility]  Sports Medicine Clinic  Clinics and Surgery Center           SUBJECTIVE       Gerry Palafox is a 47 year old male presenting to clinic today with right foot pain.    Background:   Date of injury: 1/7   Duration of symptoms: 19 days  Mechanism of Injury: Acute development of right 1st toe and ankle pain   Intensity: intermittent sharp pain with passive and active movement.    Aggravating factors: Standing while at work ([employer]), walking.   Relieving Factors: Rest, Aleve.   Prior Evaluation: None    Gerry has a PMH of gout with roughly 2 episodes of flares each year. His last flare was in his left 1st MTP joint in 6/2021 and was treated with a left 1st MTP cortisone injection.     Gerry has not had any prior right foot gout flares. He does have a history of chronic right calcaneal fractures and right ankle surgery for treatment of an ankle fracture 8 years ago. His current pain began on 1/7 and was at first primarily located at the medial aspect of the 1st MTP joint. On 1/10 Gerry was evaluated in the [facility] for Strep throat and was prescribed a course of Keflex. On 1/14 his 1st MTP pain improved but he developed additional pain in his lateral ankle. This pain has migrated from his lateral ankle, to posterior ankle, to medial ankle. He states that today he his pain is most severe at his lateral ankle.     PMH, Medications and Allergies were reviewed and updated as needed.    ROS:  As noted above otherwise negative.    Patient Active Problem List   Diagnosis     Hyperlipidemia LDL goal <160     TERRIE (obstructive sleep apnea) AHI 21.8     Pain in joint, ankle and foot     Other postprocedural status(V45.89)     Mild persistent asthma     Chronic seasonal allergic rhinitis, unspecified trigger       Current Outpatient Medications   Medication Sig Dispense Refill     albuterol (2.5 MG/3ML) 0.083% neb solution Take 1 vial (2.5 mg) by nebulization every 6 hours as needed for shortness of breath /  dyspnea or wheezing 30 vial 0     albuterol (PROAIR HFA/PROVENTIL HFA/VENTOLIN HFA) 108 (90 Base) MCG/ACT inhaler Inhale 1-2 puffs into the lungs every 4 hours as needed for shortness of breath / dyspnea or wheezing Needs follow up appointment before further refills 8.5 g 0     FLOVENT HFA 44 MCG/ACT inhaler INHALE 1 PUFF INTO THE LUNGS TWICE DAILY 10.6 g 0     IBUPROFEN PO Take 400 mg by mouth 2 times daily as needed for moderate pain       loratadine (CLARITIN) 10 MG tablet Take 10 mg by mouth daily       ORDER FOR DME Equipment being ordered: Nebulizer 1 Device 0     UNKNOWN TO PATIENT For gout, cherry supplement and celery seed.              OBJECTIVE:       Vitals: There were no vitals filed for this visit.  BMI: There is no height or weight on file to calculate BMI.    Gen:  Well nourished and in no acute distress  HEENT: Extraocular movement intact  Neck: Supple  Pulm:  Breathing Comfortably. No increased respiratory effort.  Psych: Euthymic. Appropriately answers questions    MSK:   Right Ankle:   Inspection: 10 cm scar overlying the lateral ankle. Swelling and warmth, but no erythema noted at the right ankle.   Palpation: Pain to palpation of the CFL, and ATFL. Pain to palpation distal to the lateral > medial malleoli.   Pain in passive and active ROM in plantarflexion, dorsiflexion, and eversion. Pain in resisted eversion, dorsiflexion, and plantarflexion.     Right 1st Toe:  Inspection: Swelling and warmth, but no erythema noted at the right ankle and joints  Palpation: Pain to palpation of the medial aspect of the 1st MTP joint.   Pain in passive and active flexion of the 1st toe. Pain in resisted flexion of the 1st toe.     XRAY :    3V Right Ankle/Foot XR:  Soft tissue swelling and sclerotic changes noted at the 1st MTP joint. No evidence of acute fractures.    Will await official reading.         ASSESSMENT and PLAN:     Gerry was seen today for pain.    Diagnoses and all orders for this  visit:    Acute gout involving toe of right foot, unspecified cause  47 year old presenting with right ankle and 1st toe pain without fall or other mechanism of injury. No evidence of fracture on XRs performed today. Due to the patient's history of gout and description of current pain. it is most likely that he is experiencing a gout flare. Unlikely to be right foot cellulitis due to lack of erythema and recent Keflex Rx for management of Strep throat. 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  -     predniSONE (DELTASONE) 20 MG tablet; Take 2 tablets (40 mg) by mouth daily for 5 days  -     A note was written to restrict Gerry from work for the next 3 days.   -     Follow-up PRN.    Options for treatment and/or follow-up care were reviewed with the patient was actively involved in the decision making process. Patient verbalized understanding and was in agreement with the plan.     Oswaldo Garcia, MS4  [facility] [employer]    Precepted with Dr. Torres    I appreciate the note above by medical student, Oswaldo Garcia.  I was present with the medical student who participated in the service and in the documentation of the note. I have verified the history and personally performed the physical exam and medical decision making. I agree with the assessment and plan of care as documented in the note.      Hal Torres DO  , Sports Medicine  Department of Family Medicine and [facility]  [facility]

## 2023-01-26 NOTE — LETTER
1/26/2023      RE: Gerry Palafox  [address] [place] [address]     Dear Colleague,    Thank you for referring your patient, Gerry Palafox, to the [facility] SPORTS MEDICINE CLINIC [place]. Please see a copy of my visit note below.    [facility]  Sports Medicine Clinic  Clinics and Surgery Center         SUBJECTIVE       Gerry Palafox is a 47 year old male presenting to clinic today with right foot pain.    Background:   Date of injury: 1/7   Duration of symptoms: 19 days  Mechanism of Injury: Acute development of right 1st toe and ankle pain   Intensity: intermittent sharp pain with passive and active movement.    Aggravating factors: Standing while at work ([employer]), walking.   Relieving Factors: Rest, Aleve.   Prior Evaluation: None    Gerry has a PMH of gout with roughly 2 episodes of flares each year. His last flare was in his left 1st MTP joint in 6/2021 and was treated with a left 1st MTP cortisone injection.     Gerry has not had any prior right foot gout flares. He does have a history of chronic right calcaneal fractures and right ankle surgery for treatment of an ankle fracture 8 years ago. His current pain began on 1/7 and was at first primarily located at the medial aspect of the 1st MTP joint. On 1/10 Gerry was evaluated in the [facility] for Strep throat and was prescribed a course of Keflex. On 1/14 his 1st MTP pain improved but he developed additional pain in his lateral ankle. This pain has migrated from his lateral ankle, to posterior ankle, to medial ankle. He states that today he his pain is most severe at his lateral ankle.     PMH, Medications and Allergies were reviewed and updated as needed.    ROS:  As noted above otherwise negative.    Patient Active Problem List   Diagnosis     Hyperlipidemia LDL goal <160     TERRIE (obstructive sleep apnea) AHI 21.8     Pain in joint, ankle and foot     Other postprocedural status(V45.89)     Mild persistent asthma      Chronic seasonal allergic rhinitis, unspecified trigger       Current Outpatient Medications   Medication Sig Dispense Refill     albuterol (2.5 MG/3ML) 0.083% neb solution Take 1 vial (2.5 mg) by nebulization every 6 hours as needed for shortness of breath / dyspnea or wheezing 30 vial 0     albuterol (PROAIR HFA/PROVENTIL HFA/VENTOLIN HFA) 108 (90 Base) MCG/ACT inhaler Inhale 1-2 puffs into the lungs every 4 hours as needed for shortness of breath / dyspnea or wheezing Needs follow up appointment before further refills 8.5 g 0     FLOVENT HFA 44 MCG/ACT inhaler INHALE 1 PUFF INTO THE LUNGS TWICE DAILY 10.6 g 0     IBUPROFEN PO Take 400 mg by mouth 2 times daily as needed for moderate pain       loratadine (CLARITIN) 10 MG tablet Take 10 mg by mouth daily       ORDER FOR DME Equipment being ordered: Nebulizer 1 Device 0     UNKNOWN TO PATIENT For gout, cherry supplement and celery seed.              OBJECTIVE:       Vitals: There were no vitals filed for this visit.  BMI: There is no height or weight on file to calculate BMI.    Gen:  Well nourished and in no acute distress  HEENT: Extraocular movement intact  Neck: Supple  Pulm:  Breathing Comfortably. No increased respiratory effort.  Psych: Euthymic. Appropriately answers questions    MSK:   Right Ankle:   Inspection: 10 cm scar overlying the lateral ankle. Swelling and warmth, but no erythema noted at the right ankle.   Palpation: Pain to palpation of the CFL, and ATFL. Pain to palpation distal to the lateral > medial malleoli.   Pain in passive and active ROM in plantarflexion, dorsiflexion, and eversion. Pain in resisted eversion, dorsiflexion, and plantarflexion.     Right 1st Toe:  Inspection: Swelling and warmth, but no erythema noted at the right ankle and joints  Palpation: Pain to palpation of the medial aspect of the 1st MTP joint.   Pain in passive and active flexion of the 1st toe. Pain in resisted flexion of the 1st toe.     XRAY :    3V Right  Ankle/Foot XR:  Soft tissue swelling and sclerotic changes noted at the 1st MTP joint. No evidence of acute fractures.    Will await official reading.         ASSESSMENT and PLAN:     Gerry was seen today for pain.    Diagnoses and all orders for this visit:    Acute gout involving toe of right foot, unspecified cause  47 year old presenting with right ankle and 1st toe pain without fall or other mechanism of injury. No evidence of fracture on XRs performed today. Due to the patient's history of gout and description of current pain. it is most likely that he is experiencing a gout flare. Unlikely to be right foot cellulitis due to lack of erythema and recent Keflex Rx for management of Strep throat. 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  -     predniSONE (DELTASONE) 20 MG tablet; Take 2 tablets (40 mg) by mouth daily for 5 days  -     A note was written to restrict Gerry from work for the next 3 days.   -     Follow-up PRN.    Options for treatment and/or follow-up care were reviewed with the patient was actively involved in the decision making process. Patient verbalized understanding and was in agreement with the plan.     Oswaldo Garcia, MS4  [facility] [employer]    Precepted with Dr. Torres    I appreciate the note above by medical student, Oswaldo Garcia.  I was present with the medical student who participated in the service and in the documentation of the note. I have verified the history and personally performed the physical exam and medical decision making. I agree with the assessment and plan of care as documented in the note.      Hal Torres DO  , Sports Medicine  Department of Family Medicine and [facility]

## 2023-01-26 NOTE — TELEPHONE ENCOUNTER
DIAGNOSIS: right foot swelling in Toe  / ankle /no images / Self / [employer]   APPOINTMENT DATE: 1.26.23   NOTES STATUS DETAILS   OFFICE NOTE from other specialist Internal 5.27.18 Oscar Barry,     11.18.17 Tiago Ray MD    7.17.14 Walker Dias MD    7.16.14 Pooja Rodriguez, DIANA     MEDICATION LIST Internal    MRI PACS [facility]:  7.17.14 R ankle-- report in CE      XRAYS (IMAGES & REPORTS) Internal 7.17.14  R ankle     Action January 26, 2023 8:31 AM BH   Action Taken Called cdi to push mri images. Images will arrive shortly.  9:15 AM -Received images from cdi and resolved in pacs.

## 2023-02-04 ENCOUNTER — ANCILLARY PROCEDURE (OUTPATIENT)
Dept: GENERAL RADIOLOGY | Facility: CLINIC | Age: 48
End: 2023-02-04
Attending: FAMILY MEDICINE
Payer: COMMERCIAL

## 2023-02-04 ENCOUNTER — OFFICE VISIT (OUTPATIENT)
Dept: ORTHOPEDICS | Facility: CLINIC | Age: 48
End: 2023-02-04
Payer: COMMERCIAL

## 2023-02-04 DIAGNOSIS — M25.579 PAIN IN JOINT, ANKLE AND FOOT: ICD-10-CM

## 2023-02-04 DIAGNOSIS — M25.579 PAIN IN JOINT, ANKLE AND FOOT: Primary | ICD-10-CM

## 2023-02-04 DIAGNOSIS — M10.071 ACUTE IDIOPATHIC GOUT OF RIGHT ANKLE: Primary | ICD-10-CM

## 2023-02-04 PROCEDURE — 99214 OFFICE O/P EST MOD 30 MIN: CPT | Performed by: FAMILY MEDICINE

## 2023-02-04 PROCEDURE — 73610 X-RAY EXAM OF ANKLE: CPT | Mod: RT | Performed by: RADIOLOGY

## 2023-02-04 RX ORDER — PREDNISONE 20 MG/1
TABLET ORAL
Qty: 30 TABLET | Refills: 0 | Status: SHIPPED | OUTPATIENT
Start: 2023-02-04 | End: 2023-02-19

## 2023-02-04 NOTE — Clinical Note
Ed - saw this brandon on Saturday; He will be seeing you back in a couple weeks. Please take a look at my plan and let me know if you have questions/ concerns. Pred taper and boot for now. - Chet

## 2023-02-04 NOTE — LETTER
WORK MEDICAL NOTE  2023     Seen today: Yes    Patient:  Gerry Palafox  :   1975  MRN:     [id number]  Physician: SHANKAR JAIMES    Gerry Palafox is under our care for his R foot and ankle pain. Please excuse him from work from 23 to 23. He may return to work on Date: 23.      The next clinic appointment is scheduled for (date/time) 23.    Patient limitations:  Please allow him to wear a walking boot at work; Please allow him to park closer to the work facility x 3 weeks. He is ok to stand and walk for work as tolerated.            Shankar Jaimes MD  2023  11:20 AM

## 2023-02-04 NOTE — LETTER
2/4/2023      RE: Gerry Palafox  [address] [place] [address]     Dear Colleague,    Thank you for referring your patient, Gerry Palafox, to the [facility] SPORTS MEDICINE CLINIC [place]. Please see a copy of my visit note below.    ASSESSMENT/PLAN:    (M10.410) Acute idiopathic gout of right ankle  (primary encounter diagnosis)  Comment: will tx w/ pred taper and place in walking boot; will see Dr Brody Torres back on 2/20/23 as I will be away; check labs at that appt; if improved, can start PT that week; if not better, consider further imaging of ankle; my senses is that his achilles pain is likely compensatory from gout flare and swelling/ limited motion at ankle; precautions given; note for work written; will review case and plan w/ Dr Torres as well.   Plan: predniSONE (DELTASONE) 20 MG tablet, Basic metabolic panel, Uric acid, Physical Therapy Referral          Shankar Jaimes MD  February 4, 2023  11:22 AM      Pt is a 47 year old male last seen on 1/26/2023 by Dr Brody Torres here for follow up of:   Today, the patient reports that on 1/30/23 he finished his prescription of prednisone. He reports some improvement of his pain while taking the prednisone. Pain fully returned 2/3/23. He does have a history of right great toe gout, about 20 x's. Denies PCP that manages his long term gout.    Right Foot/Ankle pain:   Location: Majority of pain is posterior ankle, but also lateral   Duration: 1 month   Trauma/ Fall? None   Able to walk? Yes, but antalgic  Swelling? Mild   Bruising? None  Numbness/ Tingling? Yes   Weakness? None   Instability? None  Snapping/Clicking? None  Imaging? Yes, XR of foot and ankle    Treatment? Prednisone and Keflex     Per Dr Torres's note:     Acute gout involving toe of right foot, unspecified cause  47 year old presenting with right ankle and 1st toe pain without fall or other mechanism of injury. No evidence of fracture on XRs performed today. Due to the patient's history  of gout and description of current pain. it is most likely that he is experiencing a gout flare. Unlikely to be right foot cellulitis due to lack of erythema and recent Keflex Rx for management of Strep throat. 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  -     predniSONE (DELTASONE) 20 MG tablet; Take 2 tablets (40 mg) by mouth daily for 5 days  -     A note was written to restrict Gerry from work for the next 3 days.   -     Follow-up PRN.    Background:   Date of injury: 1/7   Duration of symptoms: 19 days  Mechanism of Injury: Acute development of right 1st toe and ankle pain   Intensity: intermittent sharp pain with passive and active movement.    Aggravating factors: Standing while at work ([employer]), walking.   Relieving Factors: Rest, Aleve.   Prior Evaluation: None     Gerry has a PMH of gout with roughly 2 episodes of flares each year. His last flare was in his left 1st MTP joint in 6/2021 and was treated with a left 1st MTP cortisone injection.      Gerry has not had any prior right foot gout flares. He does have a history of chronic right calcaneal fractures and right ankle surgery for treatment of an ankle fracture 8 years ago. His current pain began on 1/7 and was at first primarily located at the medial aspect of the 1st MTP joint. On 1/10 Gerry was evaluated in the [facility] for Strep throat and was prescribed a course of Keflex. On 1/14 his 1st MTP pain improved but he developed additional pain in his lateral ankle. This pain has migrated from his lateral ankle, to posterior ankle, to medial ankle. He states that today he his pain is most severe at his lateral ankle.     Past Medical History:   Diagnosis Date     Mild persistent asthma      Sleep apnea     CPAP--regularly      Current Outpatient  Medications   Medication Sig Dispense Refill     albuterol (2.5 MG/3ML) 0.083% neb solution Take 1 vial (2.5 mg) by nebulization every 6 hours as needed for shortness of breath / dyspnea or wheezing 30 vial 0     albuterol (PROAIR HFA/PROVENTIL HFA/VENTOLIN HFA) 108 (90 Base) MCG/ACT inhaler Inhale 1-2 puffs into the lungs every 4 hours as needed for shortness of breath / dyspnea or wheezing Needs follow up appointment before further refills 8.5 g 0     FLOVENT HFA 44 MCG/ACT inhaler INHALE 1 PUFF INTO THE LUNGS TWICE DAILY 10.6 g 0     IBUPROFEN PO Take 400 mg by mouth 2 times daily as needed for moderate pain       loratadine (CLARITIN) 10 MG tablet Take 10 mg by mouth daily       ORDER FOR DME Equipment being ordered: Nebulizer 1 Device 0     UNKNOWN TO PATIENT For gout, cherry supplement and celery seed.        Allergies   Allergen Reactions     Penicillin G      hives     Penicillins Hives     hives      ROS:   Gen- no fevers/chills   Derm - no rash/ redness   Neuro - no numbness, no tingling   Remainder of ROS negative.     Exam:     R Foot/Ankle:   Inspection: Swelling - YES - posterolateral ankle; Bruising - NO   Sensation: intact in peroneal, tibial, sural, and saphenous distribution   ROM: limited in all planes    Strength: 5/5 in all motions; Pain w/ resisted inversion/ eversion  Bony tenderness: Medial malleolus? - NO; Lateral malleolus? - mild; Navicular? - No; 5th Metatarsal? - No; Other - NO  Ligaments Tenderness: ATFL/CFL -NO; Deltoid - NO; Syndesmosis - NO; LisFranc - NO  Tendons: Posterior Tibialis - NO; Peroneals - YES; Achilles - NO   Maneuvers: Squeeze - Neg; Hop - deferred; Colunga - intact     Xray of R ankle on February 4, 2023 at [facility] location - films personally reviewed with patient at time of visit     My impression: +spurring at anterior tibiotalar joint, otherwise negative         Again, thank you for allowing me to participate in the care of your patient.      Sincerely,    Shankar Jaimes,  MD

## 2023-02-04 NOTE — PROGRESS NOTES
ASSESSMENT/PLAN:    (M10.071) Acute idiopathic gout of right ankle  (primary encounter diagnosis)  Comment: will tx w/ pred taper and place in walking boot; will see Dr Brody Torres back on 2/20/23 as I will be away; check labs at that appt; if improved, can start PT that week; if not better, consider further imaging of ankle; my senses is that his achilles pain is likely compensatory from gout flare and swelling/ limited motion at ankle; precautions given; note for work written; will review case and plan w/ Dr Torres as well.   Plan: predniSONE (DELTASONE) 20 MG tablet, Basic metabolic panel, Uric acid, Physical Therapy Referral          Shankar Jaimes MD  February 4, 2023  11:22 AM      Pt is a 47 year old male last seen on 1/26/2023 by Dr Brody Torres here for follow up of:   Today, the patient reports that on 1/30/23 he finished his prescription of prednisone. He reports some improvement of his pain while taking the prednisone. Pain fully returned 2/3/23. He does have a history of right great toe gout, about 20 x's. Denies PCP that manages his long term gout.    Right Foot/Ankle pain:   Location: Majority of pain is posterior ankle, but also lateral   Duration: 1 month   Trauma/ Fall? None   Able to walk? Yes, but antalgic  Swelling? Mild   Bruising? None  Numbness/ Tingling? Yes   Weakness? None   Instability? None  Snapping/Clicking? None  Imaging? Yes, XR of foot and ankle    Treatment? Prednisone and Keflex     Per Dr Torres's note:     Acute gout involving toe of right foot, unspecified cause  47 year old presenting with right ankle and 1st toe pain without fall or other mechanism of injury. No evidence of fracture on XRs performed today. Due to the patient's history of gout and description of current pain. it is most likely that he is experiencing a gout flare. Unlikely to be right foot cellulitis due to lack of erythema and recent Keflex Rx for management of Strep throat. 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  -     predniSONE (DELTASONE) 20 MG tablet; Take 2 tablets (40 mg) by mouth daily for 5 days  -     A note was written to restrict Gerry from work for the next 3 days.   -     Follow-up PRN.    Background:   Date of injury: 1/7   Duration of symptoms: 19 days  Mechanism of Injury: Acute development of right 1st toe and ankle pain   Intensity: intermittent sharp pain with passive and active movement.    Aggravating factors: Standing while at work ([employer]), walking.   Relieving Factors: Rest, Aleve.   Prior Evaluation: None     Gerry has a PMH of gout with roughly 2 episodes of flares each year. His last flare was in his left 1st MTP joint in 6/2021 and was treated with a left 1st MTP cortisone injection.      Gerry has not had any prior right foot gout flares. He does have a history of chronic right calcaneal fractures and right ankle surgery for treatment of an ankle fracture 8 years ago. His current pain began on 1/7 and was at first primarily located at the medial aspect of the 1st MTP joint. On 1/10 Gerry was evaluated in the [facility] for Strep throat and was prescribed a course of Keflex. On 1/14 his 1st MTP pain improved but he developed additional pain in his lateral ankle. This pain has migrated from his lateral ankle, to posterior ankle, to medial ankle. He states that today he his pain is most severe at his lateral ankle.     Past Medical History:   Diagnosis Date     Mild persistent asthma      Sleep apnea     CPAP--regularly      Current Outpatient Medications   Medication Sig Dispense Refill     albuterol (2.5 MG/3ML) 0.083% neb solution Take 1 vial (2.5 mg) by nebulization every 6 hours as needed for shortness of breath / dyspnea or wheezing 30 vial 0     albuterol (PROAIR HFA/PROVENTIL  HFA/VENTOLIN HFA) 108 (90 Base) MCG/ACT inhaler Inhale 1-2 puffs into the lungs every 4 hours as needed for shortness of breath / dyspnea or wheezing Needs follow up appointment before further refills 8.5 g 0     FLOVENT HFA 44 MCG/ACT inhaler INHALE 1 PUFF INTO THE LUNGS TWICE DAILY 10.6 g 0     IBUPROFEN PO Take 400 mg by mouth 2 times daily as needed for moderate pain       loratadine (CLARITIN) 10 MG tablet Take 10 mg by mouth daily       ORDER FOR DME Equipment being ordered: Nebulizer 1 Device 0     UNKNOWN TO PATIENT For gout, cherry supplement and celery seed.        Allergies   Allergen Reactions     Penicillin G      hives     Penicillins Hives     hives      ROS:   Gen- no fevers/chills   Derm - no rash/ redness   Neuro - no numbness, no tingling   Remainder of ROS negative.     Exam:     R Foot/Ankle:   Inspection: Swelling - YES - posterolateral ankle; Bruising - NO   Sensation: intact in peroneal, tibial, sural, and saphenous distribution   ROM: limited in all planes    Strength: 5/5 in all motions; Pain w/ resisted inversion/ eversion  Bony tenderness: Medial malleolus? - NO; Lateral malleolus? - mild; Navicular? - No; 5th Metatarsal? - No; Other - NO  Ligaments Tenderness: ATFL/CFL -NO; Deltoid - NO; Syndesmosis - NO; LisFranc - NO  Tendons: Posterior Tibialis - NO; Peroneals - YES; Achilles - NO   Maneuvers: Squeeze - Neg; Hop - deferred; Colunga - intact     Xray of R ankle on February 4, 2023 at [facility] [place] location - films personally reviewed with patient at time of visit     My impression: +spurring at anterior tibiotalar joint, otherwise negative

## 2023-02-15 ENCOUNTER — DOCUMENTATION ONLY (OUTPATIENT)
Dept: FAMILY MEDICINE | Facility: CLINIC | Age: 48
End: 2023-02-15
Payer: COMMERCIAL

## 2023-02-15 DIAGNOSIS — M10.071 ACUTE IDIOPATHIC GOUT OF RIGHT FOOT: Primary | ICD-10-CM

## 2023-02-20 ENCOUNTER — OFFICE VISIT (OUTPATIENT)
Dept: ORTHOPEDICS | Facility: CLINIC | Age: 48
End: 2023-02-20
Payer: COMMERCIAL

## 2023-02-20 ENCOUNTER — LAB (OUTPATIENT)
Dept: LAB | Facility: CLINIC | Age: 48
End: 2023-02-20
Payer: COMMERCIAL

## 2023-02-20 DIAGNOSIS — M10.071 ACUTE IDIOPATHIC GOUT OF RIGHT ANKLE: Primary | ICD-10-CM

## 2023-02-20 DIAGNOSIS — M76.821 POSTERIOR TIBIAL TENDON DYSFUNCTION (PTTD) OF RIGHT LOWER EXTREMITY: ICD-10-CM

## 2023-02-20 DIAGNOSIS — M76.60 NON-INSERTIONAL ACHILLES TENDINOPATHY: ICD-10-CM

## 2023-02-20 DIAGNOSIS — M10.071 ACUTE IDIOPATHIC GOUT OF RIGHT ANKLE: ICD-10-CM

## 2023-02-20 LAB
ANION GAP SERPL CALCULATED.3IONS-SCNC: 10 MMOL/L (ref 7–15)
BUN SERPL-MCNC: 14.7 MG/DL (ref 6–20)
CALCIUM SERPL-MCNC: 9.1 MG/DL (ref 8.6–10)
CHLORIDE SERPL-SCNC: 101 MMOL/L (ref 98–107)
CREAT SERPL-MCNC: 0.65 MG/DL (ref 0.67–1.17)
DEPRECATED HCO3 PLAS-SCNC: 25 MMOL/L (ref 22–29)
GFR SERPL CREATININE-BSD FRML MDRD: >90 ML/MIN/1.73M2
GLUCOSE SERPL-MCNC: 114 MG/DL (ref 70–99)
POTASSIUM SERPL-SCNC: 4.1 MMOL/L (ref 3.4–5.3)
SODIUM SERPL-SCNC: 136 MMOL/L (ref 136–145)
URATE SERPL-MCNC: 6.5 MG/DL (ref 3.4–7)

## 2023-02-20 PROCEDURE — 84550 ASSAY OF BLOOD/URIC ACID: CPT | Performed by: STUDENT IN AN ORGANIZED HEALTH CARE EDUCATION/TRAINING PROGRAM

## 2023-02-20 PROCEDURE — 36415 COLL VENOUS BLD VENIPUNCTURE: CPT | Performed by: PATHOLOGY

## 2023-02-20 PROCEDURE — 80048 BASIC METABOLIC PNL TOTAL CA: CPT | Performed by: PATHOLOGY

## 2023-02-20 PROCEDURE — 99213 OFFICE O/P EST LOW 20 MIN: CPT | Performed by: STUDENT IN AN ORGANIZED HEALTH CARE EDUCATION/TRAINING PROGRAM

## 2023-02-20 RX ORDER — METHIMAZOLE 5 MG/1
1 TABLET ORAL
COMMUNITY
Start: 2023-01-11 | End: 2023-07-18

## 2023-02-20 NOTE — LETTER
2/20/2023      RE: Gerry Palafox  [address] [place] [address]     Dear Colleague,    Thank you for referring your patient, Gerry Palafox, to the [facility] [place]. Please see a copy of my visit note below.    [facility]  Sports Medicine Clinic  Clinics and Surgery Center           SUBJECTIVE       Gerry Palafox is a 47 year old male presenting to clinic today for a f/u of the right foot concerns.    2/4/23 (Theodore): ASSESSMENT/PLAN:     (M10.071) Acute idiopathic gout of right ankle  (primary encounter diagnosis)  Comment: will tx w/ pred taper and place in walking boot; will see Dr Brody Torres back on 2/20/23 as I will be away; check labs at that appt; if improved, can start PT that week; if not better, consider further imaging of ankle; my senses is that his achilles pain is likely compensatory from gout flare and swelling/ limited motion at ankle; precautions given; note for work written; will review case and plan w/ Dr Torres as well.   Plan: predniSONE (DELTASONE) 20 MG tablet, Basic metabolic panel, Uric acid, Physical Therapy Referral        Interval hx:  Patient has been immobilized in the boot for the past 2 weeks.  He does feel pretty good in the ankle.  He has been able to take the boot off and walk around at home.  Today was his first day trying to work without it and felt relatively fine.  Patient did recently go to dinner, where he had a couple drinks (beer).  Patient did notice the next day that the toe did start to feel a little worse, but he did not have any swelling or redness.  That eventually subsided.  No new injuries.    PMH, Medications and Allergies were reviewed and updated as needed.    ROS:  As noted above otherwise negative.    Patient Active Problem List   Diagnosis     Hyperlipidemia LDL goal <160     TERRIE (obstructive sleep apnea) AHI 21.8     Pain in joint, ankle and foot     Other postprocedural status(V45.89)     Mild persistent  asthma     Chronic seasonal allergic rhinitis, unspecified trigger       Current Outpatient Medications   Medication Sig Dispense Refill     albuterol (2.5 MG/3ML) 0.083% neb solution Take 1 vial (2.5 mg) by nebulization every 6 hours as needed for shortness of breath / dyspnea or wheezing 30 vial 0     albuterol (PROAIR HFA/PROVENTIL HFA/VENTOLIN HFA) 108 (90 Base) MCG/ACT inhaler Inhale 1-2 puffs into the lungs every 4 hours as needed for shortness of breath / dyspnea or wheezing Needs follow up appointment before further refills 8.5 g 0     FLOVENT HFA 44 MCG/ACT inhaler INHALE 1 PUFF INTO THE LUNGS TWICE DAILY 10.6 g 0     IBUPROFEN PO Take 400 mg by mouth 2 times daily as needed for moderate pain       loratadine (CLARITIN) 10 MG tablet Take 10 mg by mouth daily       methimazole (TAPAZOLE) 5 MG tablet Take 1 tablet by mouth daily at 2 pm       ORDER FOR DME Equipment being ordered: Nebulizer 1 Device 0     UNKNOWN TO PATIENT For gout, cherry supplement and celery seed.              OBJECTIVE:       Vitals: There were no vitals filed for this visit.  BMI: There is no height or weight on file to calculate BMI.    Gen:  Well nourished and in no acute distress  HEENT: Extraocular movement intact  Neck: Supple  Pulm:  Breathing Comfortably. No increased respiratory effort.  Psych: Euthymic. Appropriately answers questions    MSK:   (Exam from Dr. Holt on 2/4/2023)  R Foot/Ankle:   Inspection: Swelling - YES - posterolateral ankle; Bruising - NO   Sensation: intact in peroneal, tibial, sural, and saphenous distribution   ROM: limited in all planes    Strength: 5/5 in all motions; Pain w/ resisted inversion/ eversion  Bony tenderness: Medial malleolus? - NO; Lateral malleolus? - mild; Navicular? - No; 5th Metatarsal? - No; Other - NO  Ligaments Tenderness: ATFL/CFL -NO; Deltoid - NO; Syndesmosis - NO; LisFranc - NO  Tendons: Posterior Tibialis - NO; Peroneals - YES; Achilles - NO   Maneuvers: Squeeze - Neg; Hop -  deferred; Colunga - intact     Personal examination today, 2/20/2023  Right ankle without evidence of edema or ecchymosis.  No discernible tenderness to palpation of the lateral ankle.  Tenderness to palpation mildly 2 cm above the insertion of the Achilles tendon.  Tenderness to palpation in the area of the posterior tibial tendon, without evidence of arch decrease.  Ankle range of motion full and intact.  Painful resisted inversion.  Negative talar tilt, dorsiflexion/eversion, and anterior drawer.  Intact Colunga test.  Right toe without evidence of edema, warmth, or erythema.              ASSESSMENT and PLAN:     Gerry was seen today for follow up.    Diagnoses and all orders for this visit:    Acute idiopathic gout of right ankle  -     Uric acid; Future    Non-insertional Achilles tendinopathy    Posterior tibial tendon dysfunction (PTTD) of right lower extremity      47-year-old male returning to clinic today for follow-up of the right ankle and toe.  The patient does appear to have had a good amount of resolution of the great toe acute gout flare after the prednisone.  The patient has not had a recent uric acid, so this lab has been ordered for today.  Patient may very well likely need chronic suppressive therapy, but we will discuss that pending the lab results.    Of the right ankle, the pain does seem to have improved with 2 weeks worth of immobilization in a cam boot.  I do think the patient would benefit from another 2 weeks with starting physical therapy on March 1.  Appointment with PT has already been ordered.  Do not think the patient needs an MRI/advanced imaging at this point, as he specifically states that since being in the boot he has noticed an 80 to 90% increase in his functionality and progress.  We will have the patient follow-up with us in 4 weeks after starting PT, if he is having any stagnation or regression, would consider advanced imaging, however right now given the progress I do not  think it would necessarily change my recommendations.  Patient can follow-up with myself or Dr. Jaimes at his next visit.      Options for treatment and/or follow-up care were reviewed with the patient was actively involved in the decision making process. Patient verbalized understanding and was in agreement with the plan.    Hal Trores DO  , Sports Medicine  Department of Family Medicine and [facility]

## 2023-02-20 NOTE — PROGRESS NOTES
[facility]  Sports Medicine Clinic  Clinics and Surgery Center           SUBJECTIVE       Gerry Palafox is a 47 year old male presenting to clinic today for a f/u of the right foot concerns.    2/4/23 (Theodore): ASSESSMENT/PLAN:     (M10.071) Acute idiopathic gout of right ankle  (primary encounter diagnosis)  Comment: will tx w/ pred taper and place in walking boot; will see Dr Brody Torres back on 2/20/23 as I will be away; check labs at that appt; if improved, can start PT that week; if not better, consider further imaging of ankle; my senses is that his achilles pain is likely compensatory from gout flare and swelling/ limited motion at ankle; precautions given; note for work written; will review case and plan w/ Dr Torres as well.   Plan: predniSONE (DELTASONE) 20 MG tablet, Basic metabolic panel, Uric acid, Physical Therapy Referral        Interval hx:  Patient has been immobilized in the boot for the past 2 weeks.  He does feel pretty good in the ankle.  He has been able to take the boot off and walk around at home.  Today was his first day trying to work without it and felt relatively fine.  Patient did recently go to dinner, where he had a couple drinks (beer).  Patient did notice the next day that the toe did start to feel a little worse, but he did not have any swelling or redness.  That eventually subsided.  No new injuries.    PMH, Medications and Allergies were reviewed and updated as needed.    ROS:  As noted above otherwise negative.    Patient Active Problem List   Diagnosis     Hyperlipidemia LDL goal <160     TERRIE (obstructive sleep apnea) AHI 21.8     Pain in joint, ankle and foot     Other postprocedural status(V45.89)     Mild persistent asthma     Chronic seasonal allergic rhinitis, unspecified trigger       Current Outpatient Medications   Medication Sig Dispense Refill     albuterol (2.5 MG/3ML) 0.083% neb solution Take 1 vial (2.5 mg) by nebulization every 6 hours as needed for  shortness of breath / dyspnea or wheezing 30 vial 0     albuterol (PROAIR HFA/PROVENTIL HFA/VENTOLIN HFA) 108 (90 Base) MCG/ACT inhaler Inhale 1-2 puffs into the lungs every 4 hours as needed for shortness of breath / dyspnea or wheezing Needs follow up appointment before further refills 8.5 g 0     FLOVENT HFA 44 MCG/ACT inhaler INHALE 1 PUFF INTO THE LUNGS TWICE DAILY 10.6 g 0     IBUPROFEN PO Take 400 mg by mouth 2 times daily as needed for moderate pain       loratadine (CLARITIN) 10 MG tablet Take 10 mg by mouth daily       methimazole (TAPAZOLE) 5 MG tablet Take 1 tablet by mouth daily at 2 pm       ORDER FOR DME Equipment being ordered: Nebulizer 1 Device 0     UNKNOWN TO PATIENT For gout, cherry supplement and celery seed.              OBJECTIVE:       Vitals: There were no vitals filed for this visit.  BMI: There is no height or weight on file to calculate BMI.    Gen:  Well nourished and in no acute distress  HEENT: Extraocular movement intact  Neck: Supple  Pulm:  Breathing Comfortably. No increased respiratory effort.  Psych: Euthymic. Appropriately answers questions    MSK:   (Exam from Dr. Holt on 2/4/2023)  R Foot/Ankle:   Inspection: Swelling - YES - posterolateral ankle; Bruising - NO   Sensation: intact in peroneal, tibial, sural, and saphenous distribution   ROM: limited in all planes    Strength: 5/5 in all motions; Pain w/ resisted inversion/ eversion  Bony tenderness: Medial malleolus? - NO; Lateral malleolus? - mild; Navicular? - No; 5th Metatarsal? - No; Other - NO  Ligaments Tenderness: ATFL/CFL -NO; Deltoid - NO; Syndesmosis - NO; LisFranc - NO  Tendons: Posterior Tibialis - NO; Peroneals - YES; Achilles - NO   Maneuvers: Squeeze - Neg; Hop - deferred; Colunga - intact     Personal examination today, 2/20/2023  Right ankle without evidence of edema or ecchymosis.  No discernible tenderness to palpation of the lateral ankle.  Tenderness to palpation mildly 2 cm above the insertion of the  Achilles tendon.  Tenderness to palpation in the area of the posterior tibial tendon, without evidence of arch decrease.  Ankle range of motion full and intact.  Painful resisted inversion.  Negative talar tilt, dorsiflexion/eversion, and anterior drawer.  Intact Colunga test.  Right toe without evidence of edema, warmth, or erythema.              ASSESSMENT and PLAN:     Gerry was seen today for follow up.    Diagnoses and all orders for this visit:    Acute idiopathic gout of right ankle  -     Uric acid; Future    Non-insertional Achilles tendinopathy    Posterior tibial tendon dysfunction (PTTD) of right lower extremity      47-year-old male returning to clinic today for follow-up of the right ankle and toe.  The patient does appear to have had a good amount of resolution of the great toe acute gout flare after the prednisone.  The patient has not had a recent uric acid, so this lab has been ordered for today.  Patient may very well likely need chronic suppressive therapy, but we will discuss that pending the lab results.    Of the right ankle, the pain does seem to have improved with 2 weeks worth of immobilization in a cam boot.  I do think the patient would benefit from another 2 weeks with starting physical therapy on March 1.  Appointment with PT has already been ordered.  Do not think the patient needs an MRI/advanced imaging at this point, as he specifically states that since being in the boot he has noticed an 80 to 90% increase in his functionality and progress.  We will have the patient follow-up with us in 4 weeks after starting PT, if he is having any stagnation or regression, would consider advanced imaging, however right now given the progress I do not think it would necessarily change my recommendations.  Patient can follow-up with myself or Dr. Jaimes at his next visit.      Options for treatment and/or follow-up care were reviewed with the patient was actively involved in the decision making  process. Patient verbalized understanding and was in agreement with the plan.    Hal Torres DO  , Sports Medicine  Department of Family Medicine and [facility]

## 2023-02-24 ENCOUNTER — MYC MEDICAL ADVICE (OUTPATIENT)
Dept: ORTHOPEDICS | Facility: CLINIC | Age: 48
End: 2023-02-24
Payer: COMMERCIAL

## 2023-02-24 DIAGNOSIS — M76.60 NON-INSERTIONAL ACHILLES TENDINOPATHY: Primary | ICD-10-CM

## 2023-02-25 DIAGNOSIS — M10.071 ACUTE IDIOPATHIC GOUT OF RIGHT ANKLE: ICD-10-CM

## 2023-02-27 ENCOUNTER — TELEPHONE (OUTPATIENT)
Dept: ORTHOPEDICS | Facility: CLINIC | Age: 48
End: 2023-02-27

## 2023-02-27 ENCOUNTER — ANCILLARY PROCEDURE (OUTPATIENT)
Dept: MRI IMAGING | Facility: CLINIC | Age: 48
End: 2023-02-27
Attending: STUDENT IN AN ORGANIZED HEALTH CARE EDUCATION/TRAINING PROGRAM
Payer: COMMERCIAL

## 2023-02-27 DIAGNOSIS — M10.071 ACUTE IDIOPATHIC GOUT OF RIGHT ANKLE: Primary | ICD-10-CM

## 2023-02-27 DIAGNOSIS — M76.60 NON-INSERTIONAL ACHILLES TENDINOPATHY: ICD-10-CM

## 2023-02-27 DIAGNOSIS — M76.821 POSTERIOR TIBIAL TENDON DYSFUNCTION (PTTD) OF RIGHT LOWER EXTREMITY: ICD-10-CM

## 2023-02-27 PROCEDURE — 73721 MRI JNT OF LWR EXTRE W/O DYE: CPT | Mod: RT | Performed by: RADIOLOGY

## 2023-02-27 RX ORDER — NAPROXEN 500 MG/1
500 TABLET ORAL 2 TIMES DAILY PRN
Qty: 60 TABLET | Refills: 0 | Status: SHIPPED | OUTPATIENT
Start: 2023-02-27 | End: 2023-03-29

## 2023-02-27 NOTE — TELEPHONE ENCOUNTER
4:20 PM: Contacted the patient to discuss the messages sent about the refill of prednisone prescription.  Did discuss with the patient that he has been on 2 rounds of prednisone equaling roughly 20 days.  Discussed adverse events with continual use of this medication.  I do think the patient needs to get an MRI done, which he actually had done today.  Results have not posted as of yet to figure out what is going on.  His follow-up visit is April 3, which I think is too far away.  I would like the patient to return to clinic sooner to discuss the MRI as well as ongoing management options.  In the interim, did discuss that I would much prefer the patient take an anti-inflammatory medication as he does not have any contraindications to this.  He is currently taking Tylenol, and since his last message on Friday, his pain has substantially improved and he is feeling better.  I think this is a great thing and congratulated the patient on this progress.  Did  him on ER/urgent care precautions that would warrant earlier evaluation.  Naproxen 500 mg twice daily for 30 days as needed has been sent to his preferred pharmacy.  We will have our team reach out to the patient to schedule him for an earlier visit.    aHl Torres D.O., [facility]  , Sports Medicine  Department of Family Medicine and [facility]

## 2023-03-01 ENCOUNTER — HOSPITAL ENCOUNTER (OUTPATIENT)
Dept: PHYSICAL THERAPY | Facility: REHABILITATION | Age: 48
Discharge: HOME | End: 2023-03-01
Attending: FAMILY MEDICINE
Payer: COMMERCIAL

## 2023-03-01 DIAGNOSIS — M10.071 ACUTE IDIOPATHIC GOUT OF RIGHT ANKLE: ICD-10-CM

## 2023-03-01 PROCEDURE — 97110 THERAPEUTIC EXERCISES: CPT | Mod: GP | Performed by: PHYSICAL THERAPIST

## 2023-03-01 PROCEDURE — 97161 PT EVAL LOW COMPLEX 20 MIN: CPT | Mod: GP | Performed by: PHYSICAL THERAPIST

## 2023-03-01 RX ORDER — PREDNISONE 20 MG/1
TABLET ORAL
Qty: 30 TABLET | Refills: 0 | OUTPATIENT
Start: 2023-03-01 | End: 2023-03-16

## 2023-03-01 NOTE — TELEPHONE ENCOUNTER
predniSONE (DELTASONE) 20 MG tablet      Last Written Prescription Date:  2/14/2023 - 2/19/2023  Last Fill Quantity: 30,   # refills: 0  Last Office Visit : 2/20/2023  Future Office visit:  3/10/2023    Routing refill request to provider for review/approval because:  Medication not on the Ortho/Sports Med refill protocol and currently not active on med list.

## 2023-03-01 NOTE — PROGRESS NOTES
MRI impression: From MD note  1. Tibiotalar and posterior subtalar joint effusion with synovitis, possible underlying inflammatory arthritis  2. Peroneal, posterior tibialis tenosynovitis  3. Peroneus longus and brevis tendinosis with possible low-grade intrasubstance partial tear of peroneus brevis distal to fibular tip  4. High-grade sprain/tear anterior talofibular ligament    Subjective: Pt presents to therapy with right ankle pain starting the first week of January. Pt states he had gotten sick and was given an antibiotic. Original thought was the medication flared up his gout, causing his ankle pain. Pt took Prednisone for a week and pain went away. He went back to work and found it difficult to walk after a few days. He referred back to PCP and received a walking boot. The pain went away again for a short period of time then returned last Thursday after he was helping push his neighbors car out of the snow.   Pt states he notices a lot of popping when walking. Pain is inferior to medial and lateral malleoi and on anterior aspect of ankle. Lateral ankle pain is more constant, swollen. Medial aspect is more painful but not as constant. Pt works as merchandising supervisor-- stocking supplies, on feet a lot. Difficulty performing job duties.   Pt wears boot when he's at work and depending on activity level at home he'll wear it there.   Stairs ~14, has to use them. Difficulty keeping up with his kids. Wants to be able to practice baseball with his kids-- walking on uneven ground is difficult.     Current: 2/10  Best: 0/10  Worst: 8/10    Objective: 10 min    Date 3/1/2023   Exercise    3-way ankle DF/inversion/eversion with red theraband-- reviewed for HEP    Ankle alphabet Right-- reviewed for HEP    Heel raises Double leg- reviewed for HEP                                            Assessment: Pt presents to therapy onset of right ankle pain the first week of January with no specific STEPH. Suspected to be  result of gout. Special tests indicative of ATFL tear were negative despite MRI results, most likely due to increased joint effusion. No tenderness to palpation except along medial and lateral aspect of achilles tendon and posterior tib tendon. He demonstrates decreased right DF mobility/ ROM and moderately decreased right PF ROM. Performing SLS and double leg calf raise did not exacerbate symptoms, however PF-inversion AROM and PROM did. Pt presentation is consistent with right ankle pain with mobility deficits due to high grade ATFL sprain/tear per MRI, secondary to medical diagnosis of gout. Pt will benefit from skilled therapy to address the above deficits to allow pt to carry out required job duties.     Plan: Assess eversion/inversion ROM, joint mobs, progress ankle strengthening and ROM exercises     Rosita Curiel, PT

## 2023-03-01 NOTE — PROGRESS NOTES
03/01/23 1000   General Information   Type of Visit Initial OP Ortho PT Evaluation   Start of Care Date 03/01/23   Referring Physician Shankar Jaimes MD   Patient/Family Goals Statement Get my ankle to 100%   Orders Evaluate and Treat   Date of Order 02/04/23   Certification Required? No   Medical Diagnosis Acute idiopathic gout of right ankle   Surgical/Medical history reviewed Yes   General Information Comments MRI impression: From MD note  1. Tibiotalar and posterior subtalar joint effusion with synovitis, possible underlying inflammatory arthritis  2. Peroneal, posterior tibialis tenosynovitis  3. Peroneus longus and brevis tendinosis with possible low-grade intrasubstance partial tear of peroneus brevis distal to fibular tip  4. High-grade sprain/tear anterior talofibular ligament   Body Part(s)   Body Part(s) Ankle/Foot   Presentation and Etiology   Pertinent history of current problem (include personal factors and/or comorbidities that impact the POC) Pt presents to therapy with right ankle pain starting the first week of January. Pt states he had gotten sick and was given an antibiotic. Original thought was the medication flared up his gout, causing his ankle pain. Pt took Prednisone for a week and pain went away. He went back to work and found it difficult to walk after a few days. He referred back to PCP and received a walking boot. The pain went away again for a short period of time then returned last Thursday after he was helping push his neighbors car out of the snow.   Pt states he notices a lot of popping when walking. Pain is inferior to medial and lateral malleoi and on anterior aspect of ankle. Lateral ankle pain is more constant, swollen. Medial aspect is more painful but not as constant. Pt works as merchandising supervisor-- stocking supplies, on feet a lot. Difficulty performing job duties.   Pt wears boot when he's at work and depending on activity level at home he'll wear it there.   Stairs  ~14, has to use them. Difficulty keeping up with his kids. Wants to be able to practice baseball with his kids-- walking on uneven ground is difficult.     Current: 2/10  Best: 0/10  Worst: 8/10   Onset date of current episode/exacerbation 01/01/23   Fall Risk Screen   Fall screen completed by PT   Have you fallen 2 or more times in the past year? No   Have you fallen and had an injury in the past year? No   Is patient a fall risk? No   Abuse Screen (yes response referral indicated)   Feels Unsafe at Home or Work/School no   Feels Threatened by Someone no   Does Anyone Try to Keep You From Having Contact with Others or Doing Things Outside Your Home? no   Physical Signs of Abuse Present no   Ankle/Foot Objective Findings   Accessory Motion/Joint Mobility Right AP glide (ankle DF): hypomobile   Right subtalar inversion/inversion: minimal hypomobility noted   Dariel ER Test (DF/Eversion Test) (-) right   Anterior Drawer Test (-) right   Talar Tilt Test (-) right   Ankle/Foot ROM Comment Right DF: 10 degrees AROM; 12 degrees PROM    Left DF: 15 degrees AROM; 18 degrees PROM     Right PF: 40 degrees AROM, painful    Left PF: 50 degrees AROM   Palpation Tenderness to palpation right tib posterior tendon, medial and lateral aspect of achilles tendon   Gait/Locomotion assess further next session   Balance/ Proprioception (Single Leg Stance) SLS: able to do; hip drop jen   Foot Position In Standing Minimally increased right pes planus   Ankle/Foot Strength Comments 5/5 jen   Ankle/Foot Special Tests Comments Circumferential measurements: Right malleoli; 29.5 cm   Left malleoli: 28cm   Figure 8: right 71 cm; left 69 cm   Clinical Impression   Criteria for Skilled Therapeutic Interventions Met yes, treatment indicated   PT Diagnosis Right ankle pain with mobility deficits due to high grade ATFL tear/sprain per MRI   Influenced by the following impairments Decreased right joint mobility, decreased right ankle ROM, right ankle  effusion, decreased ankle strength   Functional limitations due to impairments Difficulty walking, stair climbing, standing, playing with his kids   Clinical Presentation Stable/Uncomplicated   Clinical Decision Making (Complexity) Low complexity   Therapy Frequency 2 times/Week   Predicted Duration of Therapy Intervention (days/wks) 60 days   Risk & Benefits of therapy have been explained Yes   Patient, Family & other staff in agreement with plan of care Yes   Clinical Impression Comments Pt presents to therapy onset of right ankle pain the first week of January with no specific STEPH. Suspected to be result of gout. Special tests indicative of ATFL tear were negative despite MRI results, most likely due to increased joint effusion. No tenderness to palpation except along medial and lateral aspect of achilles tendon and posterior tib tendon. He demonstrates decreased right DF mobility/ ROM and moderately decreased right PF ROM. Performing SLS and double leg calf raise did not exacerbate symptoms, however PF-inversion AROM and PROM did. Pt presentation is consistent with right ankle pain with mobility deficits due to high grade ATFL sprain/tear per MRI, secondary to medical diagnosis of gout. Pt will benefit from skilled therapy to address the above deficits to allow pt to carry out required job duties.   Ortho Goal 1   Goal Identifier HEP   Goal Description In 30 days, pt will be independent with and demonstrate understanding of his HEP   Target Date 03/31/23   Ortho Goal 2   Goal Identifier LEFS   Goal Description In 60 days, pt will improve LEFS score from 38/80 to 47/80 to indicate improvement in overall functional mobility so he can play baseball with his kids.   Target Date 04/30/23   Ortho Goal 3   Goal Identifier Walking   Goal Description In 60 days, pt will be able to walk 1 mile with pain <2/10 in order to carry out required job duties.   Target Date 04/30/23   Ortho Goal 4   Goal Identifier Standing   Goal  Description In 60 days, pt will be able to stand for at least 1 hour with minimal to no discomfort in order to play with his kids and go grocery shopping.   Target Date 04/30/23   Total Evaluation Time   PT Eval, Low Complexity Minutes (35148) 35

## 2023-03-08 ENCOUNTER — HOSPITAL ENCOUNTER (OUTPATIENT)
Dept: PHYSICAL THERAPY | Facility: REHABILITATION | Age: 48
Discharge: HOME | End: 2023-03-08
Payer: COMMERCIAL

## 2023-03-08 PROCEDURE — 97140 MANUAL THERAPY 1/> REGIONS: CPT | Mod: GP | Performed by: PHYSICAL THERAPIST

## 2023-03-08 PROCEDURE — 97112 NEUROMUSCULAR REEDUCATION: CPT | Mod: GP | Performed by: PHYSICAL THERAPIST

## 2023-03-08 PROCEDURE — 97110 THERAPEUTIC EXERCISES: CPT | Mod: GP | Performed by: PHYSICAL THERAPIST

## 2023-03-08 NOTE — PROGRESS NOTES
MRI impression: From MD note  1. Tibiotalar and posterior subtalar joint effusion with synovitis, possible underlying inflammatory arthritis  2. Peroneal, posterior tibialis tenosynovitis  3. Peroneus longus and brevis tendinosis with possible low-grade intrasubstance partial tear of peroneus brevis distal to fibular tip  4. High-grade sprain/tear anterior talofibular ligament    Subjective: Pt presents to therapy with slight right ankle pain. States it has been better over the last few days. Hasn't been wearing his boot as much due to it digging into his shins. He used a brace instead and that worked well. Exercises have been going well. He noted that he was able to descend stairs reciprocally for the first time the other day.       Current: 1-2/10  Best: 0/10  Worst: 5-6/10    Objective:    Right ankle DF AROM:   -5 degrees before manual therapy  -7 degrees after manual therapy    Therapeutic Exercise  Date 3/8/2023 3/1/2023   Exercise     Treadmill x5 min; 2.4 mph    3-way ankle 2 x12 each direction, green theraband DF/inversion/eversion with red theraband-- reviewed for HEP    Ankle alphabet  Right-- reviewed for HEP    Heel raises x8    x12 with right eccentric lowering Double leg- reviewed for HEP    Baps board  x10 clockwise/ counter- clockwise: L2    Eccentric soleus stretch on edge of step 2 x30 sec right     Romberg stance Airex pad EC; 3 x30 sec    Tandem stance Airex pad EO; 2 x30 sec jen                                 Assessment: Pt presents to therapy with improved right ankle pain since previous session. He has stopped wearing his ankle boot as much due to increased shin pain (rubbing too much), and switched to a brace which is working well. He tolerated all exercises today with no complaints of pain throughout. He demonstrates decreased right DF ROM with minimal improvements after AP joint mobilizations.   Pt presentation is still consistent with right ankle pain with mobility deficits due to high  grade ATFL sprain/tear per MRI, secondary to medical diagnosis of gout. Pt will continue to benefit from skilled therapy to address the above deficits and allow pt to carry out required job duties.     Plan: Joint mobs, progress ankle strengthening and ROM exercises, assess gastroc for any soft tissue restrictions     Rosita Curiel, PT

## 2023-03-08 NOTE — PROGRESS NOTES
[facility]  Sports Medicine Clinic  [facility]           SUBJECTIVE       Gerry Palafox is a 47 year old male presenting to clinic today via telephone for a f/u of the right ankle concerns.    2/20/23: Acute idiopathic gout of right ankle  -     Uric acid; Future     Non-insertional Achilles tendinopathy     Posterior tibial tendon dysfunction (PTTD) of right lower extremity        47-year-old male returning to clinic today for follow-up of the right ankle and toe.  The patient does appear to have had a good amount of resolution of the great toe acute gout flare after the prednisone.  The patient has not had a recent uric acid, so this lab has been ordered for today.  Patient may very well likely need chronic suppressive therapy, but we will discuss that pending the lab results.     Of the right ankle, the pain does seem to have improved with 2 weeks worth of immobilization in a cam boot.  I do think the patient would benefit from another 2 weeks with starting physical therapy on March 1.  Appointment with PT has already been ordered.  Do not think the patient needs an MRI/advanced imaging at this point, as he specifically states that since being in the boot he has noticed an 80 to 90% increase in his functionality and progress.  We will have the patient follow-up with us in 4 weeks after starting PT, if he is having any stagnation or regression, would consider advanced imaging, however right now given the progress I do not think it would necessarily change my recommendations.  Patient can follow-up with myself or Dr. Jaimes at his next visit.            Interval hx:  Gerry returns to clinic via telephone to discuss the right ankle.  The patient is not wearing the boot at this time given the fact that he felt it was digging in the front of his shin.  He has been wearing a bit of a compression type sock in the interim, without also felt mildly aggravating.  Patient has been improving and is  not having any substantial pain in the ankle.  It is also not hot nor swollen.  The patient has been intermittently taking the naproxen 500.  He is not feeling any instability of the ankle.  No injuries of the right ankle.    PMH, Medications and Allergies were reviewed and updated as needed.    ROS:  As noted above otherwise negative.    Patient Active Problem List   Diagnosis     Hyperlipidemia LDL goal <160     TERRIE (obstructive sleep apnea) AHI 21.8     Pain in joint, ankle and foot     Other postprocedural status(V45.89)     Mild persistent asthma     Chronic seasonal allergic rhinitis, unspecified trigger       Current Outpatient Medications   Medication Sig Dispense Refill     allopurinol (ZYLOPRIM) 100 MG tablet Take 1 tablet (100 mg) by mouth daily 28 tablet 0     albuterol (2.5 MG/3ML) 0.083% neb solution Take 1 vial (2.5 mg) by nebulization every 6 hours as needed for shortness of breath / dyspnea or wheezing 30 vial 0     albuterol (PROAIR HFA/PROVENTIL HFA/VENTOLIN HFA) 108 (90 Base) MCG/ACT inhaler Inhale 1-2 puffs into the lungs every 4 hours as needed for shortness of breath / dyspnea or wheezing Needs follow up appointment before further refills 8.5 g 0     FLOVENT HFA 44 MCG/ACT inhaler INHALE 1 PUFF INTO THE LUNGS TWICE DAILY 10.6 g 0     IBUPROFEN PO Take 400 mg by mouth 2 times daily as needed for moderate pain       loratadine (CLARITIN) 10 MG tablet Take 10 mg by mouth daily       methimazole (TAPAZOLE) 5 MG tablet Take 1 tablet by mouth daily at 2 pm       naproxen (NAPROSYN) 500 MG tablet Take 1 tablet (500 mg) by mouth 2 times daily as needed for moderate pain (4-6) 60 tablet 0     ORDER FOR DME Equipment being ordered: Nebulizer 1 Device 0     UNKNOWN TO PATIENT For gout, cherry supplement and celery seed.              OBJECTIVE:       Physical exam deferred due to the nature of this visit    Study Result    Narrative & Impression   MR right ankle without  contrast 2/27/2023 3:40  PM     History: Non-insertional Achilles tendinopathy     Techniques: Multiplanar multisequence imaging of the right ankle was  obtained without  administration of intra-articular or intravenous  contrast.     Comparison: Radiograph 2/4/2023     Findings:     MEDIAL COMPARTMENT     Tendons: Posterior tibialis tenosynovitis with moderate to large joint  effusion. Medial flexor tendons are intact.     Ligaments: Loss of normal striation of deep deltoid, subcortical  cystlike change at the medial malleolus and opposing talus mild edema  like signal intensity are consistent with sequelae of remote trauma.  Loculated fluid along the plantar component of the spring ligamentous  complex, likely ganglion/synovial cyst. Superficial component of the  deltoid and spring ligamentous complexes are intact.     LATERAL COMPARTMENT     Tendons: Peroneal tenosynovitis. Peroneus longus and brevis tendinosis  with possible low-grade intrasubstance partial tear of peroneus brevis  just distal to fibular tip approximately extending to the level of  posterior subtalar joint line.     Ligaments: High-grade sprain/tear anterior talofibular ligament.  Moderate grade sprain calcaneofibular ligament. Posterior talofibular  ligament and syndesmotic ligamentous complex are intact.     POSTERIOR COMPARTMENT     Achilles tendon: Intact. Trace retrocalcaneal bursal fluid.     Plantar fascia: Mild thickening of central cord of plantar  aponeurosis.      ANTERIOR COMPARTMENT     Tendons: Anterior extensor tendons are intact.     JOINTS     Moderate tibiotalar joint effusion with synovial proliferation. Small  posterior subtalar joint effusion with synovial proliferation.     GENERAL FINDINGS     Bones: No fracture, marrow contusion or infiltrative change. No talar  dome osteochondral lesion. Mild edema coronal signal intensity along  the plantar aspect of the talus in the region of tarsal tunnel, maybe  reactive osteitis.     Tiny ossicle along the  expected course of the calcaneocuboid ligament,  may be sequelae of remote trauma. Small nonspecific edema like marrow  signal intensity at the talus and calcaneus opposing to the tarsal  sinus.     Small os trigonum.     Muscles: Normal.     Tarsal tunnel: Normal.      Sinus tarsi: Normal.      ANCILLARY FINDINGS     Subcutaneous edema in lateral ankle particularly.                                                                      Impression:  1. Tibiotalar and posterior subtalar joint effusion with synovitis,  correlate clinically for possible underlying inflammatory arthritis.  2. Peroneal, posterior tibialis tenosynovitis, which can also be seen  in a setting of inflammatory arthritis.  3. Peroneus longus and brevis tendinosis with possible low-grade  intrasubstance partial tear of peroneus brevis just distal to fibular  tip approximately extending to the level of posterior subtalar joint  line.  4. High-grade sprain/tear anterior talofibular ligament.      Latest Reference Range & Units 02/20/23 16:54 02/27/23 15:39   Sodium 136 - 145 mmol/L 136    Potassium 3.4 - 5.3 mmol/L 4.1    Chloride 98 - 107 mmol/L 101    Carbon Dioxide (CO2) 22 - 29 mmol/L 25    Urea Nitrogen 6.0 - 20.0 mg/dL 14.7    Creatinine 0.67 - 1.17 mg/dL 0.65 (L)    GFR Estimate >60 mL/min/1.73m2 >90    Calcium 8.6 - 10.0 mg/dL 9.1    Anion Gap 7 - 15 mmol/L 10    Glucose 70 - 99 mg/dL 114 (H)    Uric Acid 3.4 - 7.0 mg/dL 6.5    MR ANKLE RIGHT W/O CONTRAST   Rpt   (L): Data is abnormally low  (H): Data is abnormally high  Rpt: View report in Results Review for more information          ASSESSMENT and PLAN:     Gerry was seen today for follow up.    Diagnoses and all orders for this visit:    Acute idiopathic gout of right ankle  -     allopurinol (ZYLOPRIM) 100 MG tablet; Take 1 tablet (100 mg) by mouth daily    Posterior tibialis muscle dysfunction  -     Physical Therapy Referral; Future    Right peroneal tendinosis  -     Physical Therapy  Referral; Future    Grade 1 ankle sprain  -     Physical Therapy Referral; Future      47-year-old male presenting to clinic today for follow-up of the right ankle concerns.  MRI was obtained which did show synovitis of the subtalar and posterior subtalar joint, as well as posterior tibial tenosynovitis, peroneal tendon intrasubstance tear and tenosynovitis, as well as a anterior talofibular ligament sprain/tear.  Overall the patient is not having evidence of instability.  He is actually beginning to ambulate outside of the boot with comfort at this point.  For the musculoskeletal concerns of the right ankle, I have recommended physical therapy as a means for strengthening and improvement of the functionality of his ankle.  For the synovitis of the ankle, he this does clinically fit the picture of gout with an elevated uric acid of 6.5.  4 years ago the uric acid was 7.7.  In light of the recent flares that the patient has had, I am pleased that he is improving substantially as well as subjectively.  At this point however, I do think the patient would benefit from urate lowering medications.  I have discussed with him allopurinol initiation, to which the patient is amendable.  I have prescribed 100 mg that the patient will take over the next 3 to 4 weeks, in combination for the next 14 days I would like him to take the naproxen 500 mg twice daily concomitantly.  I will have the patient follow-up in 3 to 4 weeks, at which point we will order a new uric acid level.  Depending on the level at that point, we may titrate him up to get him at a goal uric acid of less than 6.  ER/urgent care precautions have been advised.    Total telephone time: 12 minutes    Options for treatment and/or follow-up care were reviewed with the patient was actively involved in the decision making process. Patient verbalized understanding and was in agreement with the plan.    Hal Torres DO  , Sports  Medicine  Department of Family Medicine and [facility]

## 2023-03-10 ENCOUNTER — VIRTUAL VISIT (OUTPATIENT)
Dept: ORTHOPEDICS | Facility: CLINIC | Age: 48
End: 2023-03-10
Payer: COMMERCIAL

## 2023-03-10 DIAGNOSIS — S93.409A GRADE 1 ANKLE SPRAIN: ICD-10-CM

## 2023-03-10 DIAGNOSIS — M67.88 RIGHT PERONEAL TENDINOSIS: ICD-10-CM

## 2023-03-10 DIAGNOSIS — M10.071 ACUTE IDIOPATHIC GOUT OF RIGHT ANKLE: Primary | ICD-10-CM

## 2023-03-10 DIAGNOSIS — M76.829 POSTERIOR TIBIALIS MUSCLE DYSFUNCTION: ICD-10-CM

## 2023-03-10 PROCEDURE — 99213 OFFICE O/P EST LOW 20 MIN: CPT | Mod: TEL | Performed by: STUDENT IN AN ORGANIZED HEALTH CARE EDUCATION/TRAINING PROGRAM

## 2023-03-10 RX ORDER — ALLOPURINOL 100 MG/1
100 TABLET ORAL DAILY
Qty: 28 TABLET | Refills: 0 | Status: SHIPPED | OUTPATIENT
Start: 2023-03-10 | End: 2023-03-31

## 2023-03-10 NOTE — LETTER
3/10/2023       RE: Gerry Palafox  [address] [place] [address]     Dear Colleague,    Thank you for referring your patient, Gerry Palafox, to the [facility] SPORTS MEDICINE CLINIC [place] at [facility]. Please see a copy of my visit note below.    [facility]  Sports Medicine Clinic  [facility]           SUBJECTIVE       Gerry Palafox is a 47 year old male presenting to clinic today via telephone for a f/u of the right ankle concerns.    2/20/23: Acute idiopathic gout of right ankle  -     Uric acid; Future     Non-insertional Achilles tendinopathy     Posterior tibial tendon dysfunction (PTTD) of right lower extremity        47-year-old male returning to clinic today for follow-up of the right ankle and toe.  The patient does appear to have had a good amount of resolution of the great toe acute gout flare after the prednisone.  The patient has not had a recent uric acid, so this lab has been ordered for today.  Patient may very well likely need chronic suppressive therapy, but we will discuss that pending the lab results.     Of the right ankle, the pain does seem to have improved with 2 weeks worth of immobilization in a cam boot.  I do think the patient would benefit from another 2 weeks with starting physical therapy on March 1.  Appointment with PT has already been ordered.  Do not think the patient needs an MRI/advanced imaging at this point, as he specifically states that since being in the boot he has noticed an 80 to 90% increase in his functionality and progress.  We will have the patient follow-up with us in 4 weeks after starting PT, if he is having any stagnation or regression, would consider advanced imaging, however right now given the progress I do not think it would necessarily change my recommendations.  Patient can follow-up with myself or Dr. Jaimes at his next visit.            Interval hx:  Gerry  returns to clinic via telephone to discuss the right ankle.  The patient is not wearing the boot at this time given the fact that he felt it was digging in the front of his shin.  He has been wearing a bit of a compression type sock in the interim, without also felt mildly aggravating.  Patient has been improving and is not having any substantial pain in the ankle.  It is also not hot nor swollen.  The patient has been intermittently taking the naproxen 500.  He is not feeling any instability of the ankle.  No injuries of the right ankle.    PMH, Medications and Allergies were reviewed and updated as needed.    ROS:  As noted above otherwise negative.    Patient Active Problem List   Diagnosis     Hyperlipidemia LDL goal <160     TERRIE (obstructive sleep apnea) AHI 21.8     Pain in joint, ankle and foot     Other postprocedural status(V45.89)     Mild persistent asthma     Chronic seasonal allergic rhinitis, unspecified trigger       Current Outpatient Medications   Medication Sig Dispense Refill     allopurinol (ZYLOPRIM) 100 MG tablet Take 1 tablet (100 mg) by mouth daily 28 tablet 0     albuterol (2.5 MG/3ML) 0.083% neb solution Take 1 vial (2.5 mg) by nebulization every 6 hours as needed for shortness of breath / dyspnea or wheezing 30 vial 0     albuterol (PROAIR HFA/PROVENTIL HFA/VENTOLIN HFA) 108 (90 Base) MCG/ACT inhaler Inhale 1-2 puffs into the lungs every 4 hours as needed for shortness of breath / dyspnea or wheezing Needs follow up appointment before further refills 8.5 g 0     FLOVENT HFA 44 MCG/ACT inhaler INHALE 1 PUFF INTO THE LUNGS TWICE DAILY 10.6 g 0     IBUPROFEN PO Take 400 mg by mouth 2 times daily as needed for moderate pain       loratadine (CLARITIN) 10 MG tablet Take 10 mg by mouth daily       methimazole (TAPAZOLE) 5 MG tablet Take 1 tablet by mouth daily at 2 pm       naproxen (NAPROSYN) 500 MG tablet Take 1 tablet (500 mg) by mouth 2 times daily as needed for moderate pain (4-6) 60 tablet  0     ORDER FOR DME Equipment being ordered: Nebulizer 1 Device 0     UNKNOWN TO PATIENT For gout, cherry supplement and celery seed.              OBJECTIVE:       Physical exam deferred due to the nature of this visit    Study Result    Narrative & Impression   MR right ankle without  contrast 2/27/2023 3:40 PM     History: Non-insertional Achilles tendinopathy     Techniques: Multiplanar multisequence imaging of the right ankle was  obtained without  administration of intra-articular or intravenous  contrast.     Comparison: Radiograph 2/4/2023     Findings:     MEDIAL COMPARTMENT     Tendons: Posterior tibialis tenosynovitis with moderate to large joint  effusion. Medial flexor tendons are intact.     Ligaments: Loss of normal striation of deep deltoid, subcortical  cystlike change at the medial malleolus and opposing talus mild edema  like signal intensity are consistent with sequelae of remote trauma.  Loculated fluid along the plantar component of the spring ligamentous  complex, likely ganglion/synovial cyst. Superficial component of the  deltoid and spring ligamentous complexes are intact.     LATERAL COMPARTMENT     Tendons: Peroneal tenosynovitis. Peroneus longus and brevis tendinosis  with possible low-grade intrasubstance partial tear of peroneus brevis  just distal to fibular tip approximately extending to the level of  posterior subtalar joint line.     Ligaments: High-grade sprain/tear anterior talofibular ligament.  Moderate grade sprain calcaneofibular ligament. Posterior talofibular  ligament and syndesmotic ligamentous complex are intact.     POSTERIOR COMPARTMENT     Achilles tendon: Intact. Trace retrocalcaneal bursal fluid.     Plantar fascia: Mild thickening of central cord of plantar  aponeurosis.      ANTERIOR COMPARTMENT     Tendons: Anterior extensor tendons are intact.     JOINTS     Moderate tibiotalar joint effusion with synovial proliferation. Small  posterior subtalar joint effusion  with synovial proliferation.     GENERAL FINDINGS     Bones: No fracture, marrow contusion or infiltrative change. No talar  dome osteochondral lesion. Mild edema coronal signal intensity along  the plantar aspect of the talus in the region of tarsal tunnel, maybe  reactive osteitis.     Tiny ossicle along the expected course of the calcaneocuboid ligament,  may be sequelae of remote trauma. Small nonspecific edema like marrow  signal intensity at the talus and calcaneus opposing to the tarsal  sinus.     Small os trigonum.     Muscles: Normal.     Tarsal tunnel: Normal.      Sinus tarsi: Normal.      ANCILLARY FINDINGS     Subcutaneous edema in lateral ankle particularly.                                                                      Impression:  1. Tibiotalar and posterior subtalar joint effusion with synovitis,  correlate clinically for possible underlying inflammatory arthritis.  2. Peroneal, posterior tibialis tenosynovitis, which can also be seen  in a setting of inflammatory arthritis.  3. Peroneus longus and brevis tendinosis with possible low-grade  intrasubstance partial tear of peroneus brevis just distal to fibular  tip approximately extending to the level of posterior subtalar joint  line.  4. High-grade sprain/tear anterior talofibular ligament.      Latest Reference Range & Units 02/20/23 16:54 02/27/23 15:39   Sodium 136 - 145 mmol/L 136    Potassium 3.4 - 5.3 mmol/L 4.1    Chloride 98 - 107 mmol/L 101    Carbon Dioxide (CO2) 22 - 29 mmol/L 25    Urea Nitrogen 6.0 - 20.0 mg/dL 14.7    Creatinine 0.67 - 1.17 mg/dL 0.65 (L)    GFR Estimate >60 mL/min/1.73m2 >90    Calcium 8.6 - 10.0 mg/dL 9.1    Anion Gap 7 - 15 mmol/L 10    Glucose 70 - 99 mg/dL 114 (H)    Uric Acid 3.4 - 7.0 mg/dL 6.5    MR ANKLE RIGHT W/O CONTRAST   Rpt   (L): Data is abnormally low  (H): Data is abnormally high  Rpt: View report in Results Review for more information          ASSESSMENT and PLAN:     Gerry was seen today for  follow up.    Diagnoses and all orders for this visit:    Acute idiopathic gout of right ankle  -     allopurinol (ZYLOPRIM) 100 MG tablet; Take 1 tablet (100 mg) by mouth daily    Posterior tibialis muscle dysfunction  -     Physical Therapy Referral; Future    Right peroneal tendinosis  -     Physical Therapy Referral; Future    Grade 1 ankle sprain  -     Physical Therapy Referral; Future      47-year-old male presenting to clinic today for follow-up of the right ankle concerns.  MRI was obtained which did show synovitis of the subtalar and posterior subtalar joint, as well as posterior tibial tenosynovitis, peroneal tendon intrasubstance tear and tenosynovitis, as well as a anterior talofibular ligament sprain/tear.  Overall the patient is not having evidence of instability.  He is actually beginning to ambulate outside of the boot with comfort at this point.  For the musculoskeletal concerns of the right ankle, I have recommended physical therapy as a means for strengthening and improvement of the functionality of his ankle.  For the synovitis of the ankle, he this does clinically fit the picture of gout with an elevated uric acid of 6.5.  4 years ago the uric acid was 7.7.  In light of the recent flares that the patient has had, I am pleased that he is improving substantially as well as subjectively.  At this point however, I do think the patient would benefit from urate lowering medications.  I have discussed with him allopurinol initiation, to which the patient is amendable.  I have prescribed 100 mg that the patient will take over the next 3 to 4 weeks, in combination for the next 14 days I would like him to take the naproxen 500 mg twice daily concomitantly.  I will have the patient follow-up in 3 to 4 weeks, at which point we will order a new uric acid level.  Depending on the level at that point, we may titrate him up to get him at a goal uric acid of less than 6.  ER/urgent care precautions have been  advised.    Total telephone time: 12 minutes    Options for treatment and/or follow-up care were reviewed with the patient was actively involved in the decision making process. Patient verbalized understanding and was in agreement with the plan.    Hal Torres DO  , Sports Medicine  Department of Family Medicine and [facility]

## 2023-03-14 ENCOUNTER — HOSPITAL ENCOUNTER (OUTPATIENT)
Dept: PHYSICAL THERAPY | Facility: REHABILITATION | Age: 48
Discharge: HOME | End: 2023-03-14
Payer: COMMERCIAL

## 2023-03-14 DIAGNOSIS — M67.88 RIGHT PERONEAL TENDINOSIS: ICD-10-CM

## 2023-03-14 DIAGNOSIS — M76.829 POSTERIOR TIBIALIS MUSCLE DYSFUNCTION: ICD-10-CM

## 2023-03-14 DIAGNOSIS — S93.409A GRADE 1 ANKLE SPRAIN: ICD-10-CM

## 2023-03-14 PROCEDURE — 97110 THERAPEUTIC EXERCISES: CPT | Mod: GP | Performed by: PHYSICAL THERAPIST

## 2023-03-14 PROCEDURE — 97112 NEUROMUSCULAR REEDUCATION: CPT | Mod: GP | Performed by: PHYSICAL THERAPIST

## 2023-03-14 NOTE — PROGRESS NOTES
"Subjective: His ankle has been feeling better the last few days. He hasn't been wearing his brace at all because it has been feeling stronger. The worst his pain has been is a one or two at the end of his work day, and even that went away pretty quickly.    Current: 0/10  Best: 0/10  Worst: 1-2/10    Objective:    Right ankle DF AROM: 19 degrees     Therapeutic Exercise    Date 3/14/23 3/8/2023 3/1/2023   Exercise      Treadmill x5 min x5 min; 2.4 mph    3-way ankle  2 x12 each direction, green theraband DF/inversion/eversion with red theraband-- reviewed for HEP    Ankle alphabet   Right-- reviewed for HEP    Heel raises 2 x10 eccentric right lowering    x15 toes in/out x8    x12 with right eccentric lowering Double leg- reviewed for HEP    BAPS board compass ervin and circles x10 CW/CCW, x20 DF/PF and inv/EV x10 clockwise/ counter- clockwise: L2    Eccentric soleus stretch on edge of step Soleus and gastroc 2x45\"  2 x30 sec right     Romberg stance  Airex pad EC; 3 x30 sec    Tandem stance  Airex pad EO; 2 x30 sec jen    Toe raises x15     Seated HS stretch x45\" B                         Neuromuscular Reeducation    Date 3/14/23   Exercise    Tandem stance Airex pad 2 x45\" B   3-way SLS X30\" B   Skaters x10 B                                      Assessment: Gerry is showing significant improvement in his symptoms. His pain has been significantly less throughout his day, noting little to no increase during work. He demonstrates full ankle AROM and strength without any increase in his pain. He also demonstrates good balance and proprioception of his right ankle. He notes that he still feels slightly uncertain about his ankle and would like one more PT session to endure everything is going well and that he isn't going backwards at all.    Rick Graves, PT, DPT, CMTPT/DN      "

## 2023-03-22 ENCOUNTER — HOSPITAL ENCOUNTER (OUTPATIENT)
Dept: PHYSICAL THERAPY | Facility: REHABILITATION | Age: 48
Discharge: HOME | End: 2023-03-22
Payer: COMMERCIAL

## 2023-03-22 PROCEDURE — 97112 NEUROMUSCULAR REEDUCATION: CPT | Mod: GP | Performed by: PHYSICAL THERAPIST

## 2023-03-22 PROCEDURE — 97110 THERAPEUTIC EXERCISES: CPT | Mod: GP | Performed by: PHYSICAL THERAPIST

## 2023-03-22 NOTE — PROGRESS NOTES
"Subjective: Gerry reports overall his right ankle pain is improving. No increased pain with work duties. Felt a twinge one time causing 6/10 pain, but this went away. Has had no other problems.     Current: 0/10  Best: 0/10  Worst: 6/10    Objective:    Right ankle DF AROM: 20 degrees     Therapeutic Exercise    Date 3/22/2023 3/14/23 3/8/2023 3/1/2023   Exercise       Treadmill x5 min; 2.5 mph x5 min x5 min; 2.4 mph    3-way ankle   2 x12 each direction, green theraband DF/inversion/eversion with red theraband-- reviewed for HEP    Ankle alphabet    Right-- reviewed for HEP    Heel raises 2 x12 right eccentric lowering     x15 toes in/out 2 x10 eccentric right lowering    x15 toes in/out x8    x12 with right eccentric lowering Double leg- reviewed for HEP    BAPS board compass ervin and circles x10 CW/CCW, x20 each DF/PF and inv/ever x10 CW/CCW, x20 DF/PF and inv/EV x10 clockwise/ counter- clockwise: L2    Eccentric soleus stretch on edge of step Soleus and gastroc stretch 2 x45 sec  Soleus and gastroc 2x45\"  2 x30 sec right     Romberg stance   Airex pad EC; 3 x30 sec    Tandem stance   Airex pad EO; 2 x30 sec jen    Toe raises x15  x15     Seated HS stretch  x45\" B     Forward hops x10      Single leg hops x8 right      Forward jumps off 6'' step x5      Lateral line jumps Forward/ lateral x10 each        Neuromuscular Reeducation    Date 3/22/2023 3/14/23   Exercise     Tandem stance  Airex pad 2 x45\" B   3-way SLS  X30\" B   Skaters  x10 B   BOSU ball- small 2 x2 min normal stance                                          Assessment: Gerry is continuing to show show significant improvement in his symptoms. He demonstrates full and pain-free ankle ROM. He tolerated progression of exercises well today with no increase in symptoms or reports of pain. He mentioned he would like to be able jump and hop, but is nervous to do so because he doesn't want to hurt his ankle. Trailed more dynamic and plyometric exercises at end " of session which he tolerated well. Noted some right ankle discomfort with single leg hops. Overall, he demonstrates good proprioception, balance/ stability, and strength. He would like to continue with a few more sessions to ensure he feels confident with his exercises and using his ankle on all surfaces. He continues to benefit from skilled therapy to address this.     Rosita Curiel, PT

## 2023-03-30 DIAGNOSIS — M10.071 ACUTE IDIOPATHIC GOUT OF RIGHT ANKLE: ICD-10-CM

## 2023-03-31 ENCOUNTER — HOSPITAL ENCOUNTER (OUTPATIENT)
Dept: PHYSICAL THERAPY | Facility: REHABILITATION | Age: 48
Discharge: HOME | End: 2023-03-31
Payer: COMMERCIAL

## 2023-03-31 PROCEDURE — 97110 THERAPEUTIC EXERCISES: CPT | Mod: GP | Performed by: PHYSICAL THERAPIST

## 2023-03-31 RX ORDER — ALLOPURINOL 100 MG/1
TABLET ORAL
Qty: 28 TABLET | Refills: 0 | Status: SHIPPED | OUTPATIENT
Start: 2023-03-31 | End: 2023-05-17

## 2023-03-31 NOTE — PROGRESS NOTES
[facility]    Outpatient Physical Therapy Discharge Note  Patient: Gerry Palafox  : 1975    Beginning/End Dates of Reporting Period:  2023 to 2023    Referring Provider: Shankar Jaimes MD      Therapy Diagnosis: Right ankle pain with mobility deficits due to high grade ATFL tear/sprain per MRI     Client Self Report:  Gerry reports his right ankle has been feeling really good. Hasn't noticed any discomfort/ pain at all. Has been doing his exercises some, but not as much as he was previously.     Objective Measurements:  Right ankle ROM:   -DF: 20 degrees AROM  -PF: 60 degrees AROM     Right ankle strength:   -DF: 5/5  -PF: 5/5     Outcome Measures (most recent score):  LEFS  -Eval: 38/80  -Progress note: 74/80 on 3/14/2023    Goals:  Goal Identifier  HEP    Goal Description  In 30 days, pt will be independent with and demonstrate understanding of his HEP   Target Date  3/31/2023   Date Met   3/14/2023   Progress (detail required for progress note):  See progress note     Goal Identifier  LEFS    Goal Description  In 60 days, pt will improve LEFS score from 38/80 to 47/80 to indicate improvement in overall functional mobility so he can play baseball with his kids.   Target Date  2023   Date Met   3/14/2023    Progress (detail required for progress note):  See progress note     Goal Identifier  Walking   Goal Description  In 60 days, pt will be able to walk 1 mile with pain <2/10 in order to carry out required job duties.   Target Date  2023   Date Met   3/14/2023   Progress (detail required for progress note):  See progress note     Goal Identifier  Standing   Goal Description  In 60 days, pt will be able to stand for at least 1 hour with minimal to no discomfort in order to play with his kids and go grocery shopping.   Target Date  2023   Date Met   3/14/2023   Progress (detail  "required for progress note):  See progress note       Plan:  Discharge from therapy.    Discharge:    Reason for Discharge: Patient has met all goals.    Equipment Issued: Red theraband     Discharge Plan: Patient to continue home program.      Objective:    Therapeutic Exercise    Date 3/31/2023 3/22/2023 3/14/23 3/8/2023 3/1/2023   Exercise        Treadmill x5 min; 2.5 mph x5 min; 2.5 mph x5 min x5 min; 2.4 mph    3-way ankle    2 x12 each direction, green theraband DF/inversion/eversion with red theraband-- reviewed for HEP    Ankle alphabet     Right-- reviewed for HEP    Heel raises x25 double leg   2 x12 right eccentric lowering     x15 toes in/out 2 x10 eccentric right lowering    x15 toes in/out x8    x12 with right eccentric lowering Double leg- reviewed for HEP    BAPS board compass ervin and circles  x10 CW/CCW, x20 each DF/PF and inv/ever x10 CW/CCW, x20 DF/PF and inv/EV x10 clockwise/ counter- clockwise: L2    Eccentric soleus stretch on edge of step  Soleus and gastroc stretch 2 x45 sec  Soleus and gastroc 2x45\"  2 x30 sec right     Romberg stance    Airex pad EC; 3 x30 sec    Tandem stance    Airex pad EO; 2 x30 sec jen    Toe raises  x15  x15     Seated HS stretch   x45\" B     Forward hops  x10      Jump squats x6        Split squat to SLS x10 jen       Single leg hops With double leg landing: x8 right x8 right      Forward jumps off step X3; 12'' step X5; 6'' step      Lateral line jumps  Forward/ lateral x10 each        Neuromuscular Reeducation    Date 3/31/2023 3/22/2023 3/14/23   Exercise      Tandem stance   Airex pad 2 x45\" B   3-way SLS   X30\" B   Skaters   x10 B   BOSU ball Large BOSU: SLS right 2 x30 sec Small BOSU: 2 x2 min normal stance                                                 Assessment: Gerry has demonstrated improvement since start of care indicated by a significant improvement in LEFS score (38/80 to 74/80) and improved right ankle AROM. He is able to tolerate all exercises with " no pain or discomfort, including plyometric exercises involving double and single leg jumping. He has met all of his therapy goals and will benefit further from continuing with his HEP on his own. Pt agrees with plan of care and will be discharged from therapy today.       Rosita Curiel, PT

## 2023-04-03 ENCOUNTER — MYC MEDICAL ADVICE (OUTPATIENT)
Dept: ORTHOPEDICS | Facility: CLINIC | Age: 48
End: 2023-04-03

## 2023-04-05 DIAGNOSIS — M10.071 ACUTE IDIOPATHIC GOUT OF RIGHT ANKLE: Primary | ICD-10-CM

## 2023-04-11 ENCOUNTER — LAB (OUTPATIENT)
Dept: LAB | Facility: CLINIC | Age: 48
End: 2023-04-11
Payer: COMMERCIAL

## 2023-04-11 DIAGNOSIS — M10.071 ACUTE IDIOPATHIC GOUT OF RIGHT ANKLE: ICD-10-CM

## 2023-04-11 LAB — URATE SERPL-MCNC: 8.1 MG/DL (ref 3.4–7)

## 2023-04-11 PROCEDURE — 36415 COLL VENOUS BLD VENIPUNCTURE: CPT | Performed by: PATHOLOGY

## 2023-04-11 PROCEDURE — 84550 ASSAY OF BLOOD/URIC ACID: CPT | Performed by: PATHOLOGY

## 2023-04-14 DIAGNOSIS — M10.071 ACUTE IDIOPATHIC GOUT OF RIGHT ANKLE: Primary | ICD-10-CM

## 2023-04-14 RX ORDER — ALLOPURINOL 100 MG/1
150 TABLET ORAL DAILY
Qty: 45 TABLET | Refills: 0 | Status: SHIPPED | OUTPATIENT
Start: 2023-04-14 | End: 2023-05-14

## 2023-04-14 NOTE — PROGRESS NOTES
10:27 AM: Contact the patient with his uric acid level, elevated at 8.1.  Patient finished the allopurinol 100 mg.  We will titrate up by 50 mg.  New prescription of allopurinol 150 mg was sent to the [facility] for 30-day supply.  We will recheck a uric acid level in 4 weeks, and titrate or keep the patient at this particular dose.  All questions answered.    Hal Torres D.O., [facility]  , Sports Medicine  Department of Family Medicine and [facility]

## 2023-05-10 NOTE — PROGRESS NOTES
[facility]  Sports Medicine Clinic  Clinics and Surgery Center           SUBJECTIVE       Gerry Palafox is a 47 year old male presenting to clinic today for a gout follow-up.  Patient is following up via telephone call to discuss his recent gout flares, gout diagnosis, and medication.  He has been compliant with the medication.  Last uric acid was 8.1.  He is on 100 mg of allopurinol at this point.  No redness, no swelling, no pain.  No injuries to the foot.    3/10/23: Acute idiopathic gout of right ankle  -     allopurinol (ZYLOPRIM) 100 MG tablet; Take 1 tablet (100 mg) by mouth daily     Posterior tibialis muscle dysfunction  -     Physical Therapy Referral; Future     Right peroneal tendinosis  -     Physical Therapy Referral; Future     Grade 1 ankle sprain  -     Physical Therapy Referral; Future        47-year-old male presenting to clinic today for follow-up of the right ankle concerns.  MRI was obtained which did show synovitis of the subtalar and posterior subtalar joint, as well as posterior tibial tenosynovitis, peroneal tendon intrasubstance tear and tenosynovitis, as well as a anterior talofibular ligament sprain/tear.  Overall the patient is not having evidence of instability.  He is actually beginning to ambulate outside of the boot with comfort at this point.  For the musculoskeletal concerns of the right ankle, I have recommended physical therapy as a means for strengthening and improvement of the functionality of his ankle.  For the synovitis of the ankle, he this does clinically fit the picture of gout with an elevated uric acid of 6.5.  4 years ago the uric acid was 7.7.  In light of the recent flares that the patient has had, I am pleased that he is improving substantially as well as subjectively.  At this point however, I do think the patient would benefit from urate lowering medications.  I have discussed with him allopurinol initiation, to which the patient is amendable.  I  have prescribed 100 mg that the patient will take over the next 3 to 4 weeks, in combination for the next 14 days I would like him to take the naproxen 500 mg twice daily concomitantly.  I will have the patient follow-up in 3 to 4 weeks, at which point we will order a new uric acid level.  Depending on the level at that point, we may titrate him up to get him at a goal uric acid of less than 6.  ER/urgent care precautions have been advised.       4/14/23: Progress Notes  Hal Torres DO (Physician)     Sports Medicine  10:27 AM: Contact the patient with his uric acid level, elevated at 8.1.  Patient finished the allopurinol 100 mg.  We will titrate up by 50 mg.  New prescription of allopurinol 150 mg was sent to the [facility] pharmacy for 30-day supply.  We will recheck a uric acid level in 4 weeks, and titrate or keep the patient at this particular dose.  All questions answered.     Hal Torres D.O., CAM  , Sports Medicine  Department of Family Medicine and [facility]            PMH, Medications and Allergies were reviewed and updated as needed.    ROS:  As noted above otherwise negative.    Patient Active Problem List   Diagnosis     Hyperlipidemia LDL goal <160     TERRIE (obstructive sleep apnea) AHI 21.8     Pain in joint, ankle and foot     Other postprocedural status(V45.89)     Mild persistent asthma     Chronic seasonal allergic rhinitis, unspecified trigger     Right peroneal tendinosis     Grade 1 ankle sprain       Current Outpatient Medications   Medication Sig Dispense Refill     albuterol (2.5 MG/3ML) 0.083% neb solution Take 1 vial (2.5 mg) by nebulization every 6 hours as needed for shortness of breath / dyspnea or wheezing 30 vial 0     albuterol (PROAIR HFA/PROVENTIL HFA/VENTOLIN HFA) 108 (90 Base) MCG/ACT inhaler Inhale 1-2 puffs into the lungs every 4 hours as needed for shortness of breath / dyspnea or wheezing Needs follow up appointment  before further refills 8.5 g 0     allopurinol (ZYLOPRIM) 100 MG tablet Take 1.5 tablets (150 mg) by mouth daily for 30 days 45 tablet 0     allopurinol (ZYLOPRIM) 100 MG tablet TAKE 1 TABLET BY MOUTH EVERY DAY 28 tablet 0     FLOVENT HFA 44 MCG/ACT inhaler INHALE 1 PUFF INTO THE LUNGS TWICE DAILY 10.6 g 0     IBUPROFEN PO Take 400 mg by mouth 2 times daily as needed for moderate pain       loratadine (CLARITIN) 10 MG tablet Take 10 mg by mouth daily       methimazole (TAPAZOLE) 5 MG tablet Take 1 tablet by mouth daily at 2 pm       ORDER FOR DME Equipment being ordered: Nebulizer 1 Device 0     UNKNOWN TO PATIENT For gout, cherry supplement and celery seed.              OBJECTIVE:       Vitals: There were no vitals filed for this visit.  BMI: There is no height or weight on file to calculate BMI.    Physical exam limited due to this being a virtual visit     Latest Reference Range & Units 04/11/23 13:07   Uric Acid 3.4 - 7.0 mg/dL 8.1 (H)   (H): Data is abnormally high          ASSESSMENT and PLAN:     Diagnoses and all orders for this visit:    Acute idiopathic gout of right ankle      47-year-old male presenting to clinic today for follow-up of the right foot, toe, ankle.  Patient have a uric acid level of 8.1 and was initially started on allopurinol.  He has been taking the medication consistently.  This has been done over the last 4 weeks.  At this point, given the consistency, I would like to have the patient get a another uric acid level and titrate allopurinol accordingly.  His goal is going to be less than 6.  After results of the uric acid levels have been posted, we will contact the patient and let him know whether we need to increase the medication slightly or keep him at the same dosage.  All questions were answered.    Total telephone time: 5 minutes    Options for treatment and/or follow-up care were reviewed with the patient was actively involved in the decision making process. Patient verbalized  understanding and was in agreement with the plan.    Hal Torres DO  , Sports Medicine  Department of Family Medicine and [facility]

## 2023-05-12 ENCOUNTER — VIRTUAL VISIT (OUTPATIENT)
Dept: ORTHOPEDICS | Facility: CLINIC | Age: 48
End: 2023-05-12
Payer: COMMERCIAL

## 2023-05-12 DIAGNOSIS — M10.071 ACUTE IDIOPATHIC GOUT OF RIGHT ANKLE: Primary | ICD-10-CM

## 2023-05-12 PROCEDURE — 99213 OFFICE O/P EST LOW 20 MIN: CPT | Mod: TEL | Performed by: STUDENT IN AN ORGANIZED HEALTH CARE EDUCATION/TRAINING PROGRAM

## 2023-05-12 NOTE — LETTER
5/12/2023       RE: Gerry Palafox  [address] [place] [address]     Dear Colleague,    Thank you for referring your patient, Gerry Palafox, to the [facility] SPORTS MEDICINE CLINIC [place] at [facility]. Please see a copy of my visit note below.    [facility]  Sports Medicine Clinic  Clinics and Surgery Center           SUBJECTIVE       Gerry Palafox is a 47 year old male presenting to clinic today for a gout follow-up.  Patient is following up via telephone call to discuss his recent gout flares, gout diagnosis, and medication.  He has been compliant with the medication.  Last uric acid was 8.1.  He is on 100 mg of allopurinol at this point.  No redness, no swelling, no pain.  No injuries to the foot.    3/10/23: Acute idiopathic gout of right ankle  -     allopurinol (ZYLOPRIM) 100 MG tablet; Take 1 tablet (100 mg) by mouth daily     Posterior tibialis muscle dysfunction  -     Physical Therapy Referral; Future     Right peroneal tendinosis  -     Physical Therapy Referral; Future     Grade 1 ankle sprain  -     Physical Therapy Referral; Future        47-year-old male presenting to clinic today for follow-up of the right ankle concerns.  MRI was obtained which did show synovitis of the subtalar and posterior subtalar joint, as well as posterior tibial tenosynovitis, peroneal tendon intrasubstance tear and tenosynovitis, as well as a anterior talofibular ligament sprain/tear.  Overall the patient is not having evidence of instability.  He is actually beginning to ambulate outside of the boot with comfort at this point.  For the musculoskeletal concerns of the right ankle, I have recommended physical therapy as a means for strengthening and improvement of the functionality of his ankle.  For the synovitis of the ankle, he this does clinically fit the picture of gout with an elevated uric acid of 6.5.  4 years ago the uric acid was 7.7.   In light of the recent flares that the patient has had, I am pleased that he is improving substantially as well as subjectively.  At this point however, I do think the patient would benefit from urate lowering medications.  I have discussed with him allopurinol initiation, to which the patient is amendable.  I have prescribed 100 mg that the patient will take over the next 3 to 4 weeks, in combination for the next 14 days I would like him to take the naproxen 500 mg twice daily concomitantly.  I will have the patient follow-up in 3 to 4 weeks, at which point we will order a new uric acid level.  Depending on the level at that point, we may titrate him up to get him at a goal uric acid of less than 6.  ER/urgent care precautions have been advised.       4/14/23: Progress Notes  Hal Torres DO (Physician)   Sports Medicine  10:27 AM: Contact the patient with his uric acid level, elevated at 8.1.  Patient finished the allopurinol 100 mg.  We will titrate up by 50 mg.  New prescription of allopurinol 150 mg was sent to the [facility] pharmacy for 30-day supply.  We will recheck a uric acid level in 4 weeks, and titrate or keep the patient at this particular dose.  All questions answered.     Hal Torres D.O., [facility]  , Sports Medicine  Department of Family Medicine and [facility], Medications and Allergies were reviewed and updated as needed.    ROS:  As noted above otherwise negative.    Patient Active Problem List   Diagnosis    Hyperlipidemia LDL goal <160    TERRIE (obstructive sleep apnea) AHI 21.8    Pain in joint, ankle and foot    Other postprocedural status(V45.89)    Mild persistent asthma    Chronic seasonal allergic rhinitis, unspecified trigger    Right peroneal tendinosis    Grade 1 ankle sprain       Current Outpatient Medications   Medication Sig Dispense Refill    albuterol (2.5 MG/3ML) 0.083% neb solution Take 1 vial (2.5 mg) by  nebulization every 6 hours as needed for shortness of breath / dyspnea or wheezing 30 vial 0    albuterol (PROAIR HFA/PROVENTIL HFA/VENTOLIN HFA) 108 (90 Base) MCG/ACT inhaler Inhale 1-2 puffs into the lungs every 4 hours as needed for shortness of breath / dyspnea or wheezing Needs follow up appointment before further refills 8.5 g 0    allopurinol (ZYLOPRIM) 100 MG tablet Take 1.5 tablets (150 mg) by mouth daily for 30 days 45 tablet 0    allopurinol (ZYLOPRIM) 100 MG tablet TAKE 1 TABLET BY MOUTH EVERY DAY 28 tablet 0    FLOVENT HFA 44 MCG/ACT inhaler INHALE 1 PUFF INTO THE LUNGS TWICE DAILY 10.6 g 0    IBUPROFEN PO Take 400 mg by mouth 2 times daily as needed for moderate pain      loratadine (CLARITIN) 10 MG tablet Take 10 mg by mouth daily      methimazole (TAPAZOLE) 5 MG tablet Take 1 tablet by mouth daily at 2 pm      ORDER FOR DME Equipment being ordered: Nebulizer 1 Device 0    UNKNOWN TO PATIENT For gout, cherry supplement and celery seed.              OBJECTIVE:       Vitals: There were no vitals filed for this visit.  BMI: There is no height or weight on file to calculate BMI.    Physical exam limited due to this being a virtual visit     Latest Reference Range & Units 04/11/23 13:07   Uric Acid 3.4 - 7.0 mg/dL 8.1 (H)   (H): Data is abnormally high          ASSESSMENT and PLAN:     Diagnoses and all orders for this visit:    Acute idiopathic gout of right ankle      47-year-old male presenting to clinic today for follow-up of the right foot, toe, ankle.  Patient have a uric acid level of 8.1 and was initially started on allopurinol.  He has been taking the medication consistently.  This has been done over the last 4 weeks.  At this point, given the consistency, I would like to have the patient get a another uric acid level and titrate allopurinol accordingly.  His goal is going to be less than 6.  After results of the uric acid levels have been posted, we will contact the patient and let him know  whether we need to increase the medication slightly or keep him at the same dosage.  All questions were answered.    Total telephone time: 5 minutes    Options for treatment and/or follow-up care were reviewed with the patient was actively involved in the decision making process. Patient verbalized understanding and was in agreement with the plan.    Hal Torres DO  , Sports Medicine  Department of Family Medicine and [facility]

## 2023-05-16 ENCOUNTER — OFFICE VISIT (OUTPATIENT)
Dept: ORTHOPEDICS | Facility: CLINIC | Age: 48
End: 2023-05-16
Payer: COMMERCIAL

## 2023-05-16 DIAGNOSIS — M10.9 GOUTY ARTHRITIS OF LEFT GREAT TOE: Primary | ICD-10-CM

## 2023-05-16 DIAGNOSIS — M10.071 ACUTE IDIOPATHIC GOUT OF RIGHT ANKLE: ICD-10-CM

## 2023-05-16 DIAGNOSIS — M25.572 PAIN OF JOINT OF LEFT ANKLE AND FOOT: ICD-10-CM

## 2023-05-16 PROCEDURE — 99207 PR DROP WITH A PROCEDURE: CPT | Performed by: FAMILY MEDICINE

## 2023-05-16 PROCEDURE — 20604 DRAIN/INJ JOINT/BURSA W/US: CPT | Mod: LT | Performed by: FAMILY MEDICINE

## 2023-05-16 RX ADMIN — TRIAMCINOLONE ACETONIDE 40 MG: 40 INJECTION, SUSPENSION INTRA-ARTICULAR; INTRAMUSCULAR at 14:33

## 2023-05-16 NOTE — NURSING NOTE
[address] [address]  Dept: [phone]  ______________________________________________________________________________    Patient: Gerry Palafox   : 1975   MRN: [id number]   May 16, 2023    INVASIVE PROCEDURE SAFETY CHECKLIST    Date: May 16, 2023   Procedure: Left great toe injection   Patient Name: Gerry Palafox  MRN: [id number]  YOB: 1975    Action: Complete sections as appropriate. Any discrepancy results in a HARD COPY until resolved.     PRE PROCEDURE:  Patient ID verified with 2 identifiers (name and  or MRN): Yes  Procedure and site verified with patient/designee (when able): Yes  Accurate consent documentation in medical record: Yes  H&P (or appropriate assessment) documented in medical record: Yes  H&P must be up to 20 days prior to procedure and updates within 24 hours of procedure as applicable: NA  Relevant diagnostic and radiology test results appropriately labeled and displayed as applicable: Yes  Procedure site(s) marked with provider initials: NA    TIMEOUT:  Time-Out performed immediately prior to starting procedure, including verbal and active participation of all team members addressing the following:Yes  * Correct patient identify  * Confirmed that the correct side and site are marked  * An accurate procedure consent form  * Agreement on the procedure to be done  * Correct patient position  * Relevant images and results are properly labeled and appropriately displayed  * The need to administer antibiotics or fluids for irrigation purposes during the procedure as applicable   * Safety precautions based on patient history or medication use    DURING PROCEDURE: Verification of correct person, site, and procedures any time the responsibility for care of the patient is transferred to another member of the care team.       Prior to injection, verified patient identity using patient's name and date of  birth.  Due to injection administration, patient instructed to remain in clinic for 15 minutes  afterwards, and to report any adverse reaction to me immediately.    Joint injection was performed.      Drug Amount Wasted:  Yes: 4 mg/ml   Vial/Syringe: Single dose vial  Expiration Date:  12/26      Fanta Phan, [facility]  May 16, 2023

## 2023-05-16 NOTE — PROGRESS NOTES
ESTABLISHED PATIENT FOLLOW-UP:  Pain of the Left Foot     HISTORY OF PRESENT ILLNESS  Mr. Palafox is a pleasant 47 year old year old male who presents to clinic today for follow-up of left foot pain. Patient has been previously followed by Dr. Brody Torres for gout of his left foot. He has been taking 100 mg of allopurinol for management. Most recent labs were on 4/11/23 which showed his Uric acid at 8.1. He endorses increased pain, redness, and swelling of the left great toe starting on 5/13 after prolonged standing at work. He has been taking tylenol and Aleve for pain management currently.    Additional medical/Social/Surgical histories reviewed in EPIC and updated as appropriate.    Diagnosis: Gouty arthropathy of left great toe. Acute pain of first MTP joint of left foot.    Referral: Dr. Brody Torres MD    First Metatarsophalangeal joint Injection- Ultrasound Guided    The patient was informed of the risks and the benefits of the procedure and a written consent was signed.    The patient s left great toe was prepped with chlorhexidine in sterile fashion.     20 mg of methylprednisolone suspension was drawn up into a 3 mL syringe with 0.5 mL of 1% lidocaine.    Injection was performed using sterile technique.  Under ultrasound guidance a 1.5-inch 25-gauge needle was used to enter the 1st MTP joint of the left great toe.  Needle placement was visualized and documented with ultrasound.  Needle placed in short axis to the probe.  Ultrasound visualization was necessary due to decreased joint space in the setting of osteoarthritis.  Images were permanently stored for the patient's record. There were no complications. The patient tolerated the procedure well. There was negligible bleeding.    The patient was instructed to ice the toe upon leaving clinic and refrain from overuse over the next 3 days.     The patient was instructed to call or go to the emergency room with any unusual pain, swelling, redness, or if otherwise  concerned.    Oscar Barry DO CAQSM  Primary Care Sports Medicine  [facility]    Small Joint Injection/Arthrocentesis: L great MTP    Date/Time: 5/16/2023 2:33 PM    Performed by: Oscar Barry DO  Authorized by: Oscar Barry DO  Indications:  Pain  Needle Size:  25 G  Guidance: ultrasound     Approach:  Dorsal  Location:  Great toe    Site:  L great MTP                    Medications:  40 mg triamcinolone 40 MG/ML        Outcome:  Tolerated well, no immediate complications  Procedure discussed: discussed risks, benefits, and alternatives    Consent Given by:  Patient  Timeout: timeout called immediately prior to procedure    Prep: patient was prepped and draped in usual sterile fashion

## 2023-05-16 NOTE — LETTER
5/16/2023      RE: Gerry Palafox  [address] [place] [address]     Dear Colleague,    Thank you for referring your patient, Gerry Palafox, to the [facility] SPORTS MEDICINE CLINIC [place]. Please see a copy of my visit note below.    ESTABLISHED PATIENT FOLLOW-UP:  Pain of the Left Foot     HISTORY OF PRESENT ILLNESS  Mr. Palafox is a pleasant 47 year old year old male who presents to clinic today for follow-up of left foot pain. Patient has been previously followed by Dr. Brody Torres for gout of his left foot. He has been taking 100 mg of allopurinol for management. Most recent labs were on 4/11/23 which showed his Uric acid at 8.1. He endorses increased pain, redness, and swelling of the left great toe starting on 5/13 after prolonged standing at work. He has been taking tylenol and Aleve for pain management currently.    Additional medical/Social/Surgical histories reviewed in EPIC and updated as appropriate.    Diagnosis: Gouty arthropathy of left great toe. Acute pain of first MTP joint of left foot.    Referral: Dr. Brody Torres MD    First Metatarsophalangeal joint Injection- Ultrasound Guided    The patient was informed of the risks and the benefits of the procedure and a written consent was signed.    The patient s left great toe was prepped with chlorhexidine in sterile fashion.     20 mg of methylprednisolone suspension was drawn up into a 3 mL syringe with 0.5 mL of 1% lidocaine.    Injection was performed using sterile technique.  Under ultrasound guidance a 1.5-inch 25-gauge needle was used to enter the 1st MTP joint of the left great toe.  Needle placement was visualized and documented with ultrasound.  Needle placed in short axis to the probe.  Ultrasound visualization was necessary due to decreased joint space in the setting of osteoarthritis.  Images were permanently stored for the patient's record. There were no complications. The patient tolerated the procedure well. There was  negligible bleeding.    The patient was instructed to ice the toe upon leaving clinic and refrain from overuse over the next 3 days.     The patient was instructed to call or go to the emergency room with any unusual pain, swelling, redness, or if otherwise concerned.    Oscar Barry DO CAM  Primary Care Sports Medicine  [facility]    Small Joint Injection/Arthrocentesis: L great MTP    Date/Time: 5/16/2023 2:33 PM    Performed by: Oscar Barry DO  Authorized by: Oscar Barry DO  Indications:  Pain  Needle Size:  25 G  Guidance: ultrasound     Approach:  Dorsal  Location:  Great toe    Site:  L great MTP                 Medications:  40 mg triamcinolone 40 MG/ML        Outcome:  Tolerated well, no immediate complications  Procedure discussed: discussed risks, benefits, and alternatives    Consent Given by:  Patient  Timeout: timeout called immediately prior to procedure    Prep: patient was prepped and draped in usual sterile fashion          Again, thank you for allowing me to participate in the care of your patient.      Sincerely,    Oscar Barry DO

## 2023-05-17 RX ORDER — ALLOPURINOL 100 MG/1
TABLET ORAL
Qty: 45 TABLET | Refills: 0 | Status: SHIPPED | OUTPATIENT
Start: 2023-05-17 | End: 2023-05-26

## 2023-05-18 RX ORDER — TRIAMCINOLONE ACETONIDE 40 MG/ML
40 INJECTION, SUSPENSION INTRA-ARTICULAR; INTRAMUSCULAR
Status: SHIPPED | OUTPATIENT
Start: 2023-05-16

## 2023-05-23 ENCOUNTER — LAB (OUTPATIENT)
Dept: LAB | Facility: CLINIC | Age: 48
End: 2023-05-23
Payer: COMMERCIAL

## 2023-05-23 DIAGNOSIS — M10.071 ACUTE IDIOPATHIC GOUT OF RIGHT ANKLE: ICD-10-CM

## 2023-05-23 LAB — URATE SERPL-MCNC: 7.3 MG/DL (ref 3.4–7)

## 2023-05-23 PROCEDURE — 84550 ASSAY OF BLOOD/URIC ACID: CPT | Performed by: PATHOLOGY

## 2023-05-23 PROCEDURE — 36415 COLL VENOUS BLD VENIPUNCTURE: CPT | Performed by: PATHOLOGY

## 2023-05-26 DIAGNOSIS — M10.071 ACUTE IDIOPATHIC GOUT OF RIGHT ANKLE: ICD-10-CM

## 2023-05-26 RX ORDER — COLCHICINE 0.6 MG/1
0.6 TABLET ORAL DAILY
Qty: 42 TABLET | Refills: 0 | Status: SHIPPED | OUTPATIENT
Start: 2023-05-26 | End: 2023-07-18

## 2023-05-26 RX ORDER — ALLOPURINOL 100 MG/1
TABLET ORAL
Qty: 63 TABLET | Refills: 0 | Status: SHIPPED | OUTPATIENT
Start: 2023-05-26 | End: 2023-05-30

## 2023-05-26 NOTE — TELEPHONE ENCOUNTER
Called Gerry and helped him schedule a f/u for gout on 7/7/23 per Dr. oTrres.      Jaiden Poole, ATC

## 2023-06-03 ENCOUNTER — HEALTH MAINTENANCE LETTER (OUTPATIENT)
Age: 48
End: 2023-06-03

## 2023-06-07 ENCOUNTER — RX ONLY (RX ONLY)
Age: 48
End: 2023-06-07

## 2023-06-07 RX ORDER — MINOXIDIL 2.5 MG/1
2.5MG TABLET ORAL BID
Qty: 60 | Refills: 0 | Status: ERX | COMMUNITY
Start: 2023-06-07

## 2023-06-21 ENCOUNTER — TELEPHONE (OUTPATIENT)
Dept: ORTHOPEDICS | Facility: CLINIC | Age: 48
End: 2023-06-21
Payer: COMMERCIAL

## 2023-06-21 NOTE — TELEPHONE ENCOUNTER
Spoke to Gerry about his questions. He stated that he was given Azithromycin by [facility] for a sore throat and is wondering if he would be able to continue the colchicine because his pharmacy stated there could be an interaction between the two medications. I asked him if he informed the [facility] that he was taking this medication and he stated that he did not but the pharmacy would be reaching out to the doctor that prescribed it. I informed him that Dr. Torres is not in clinic today but I would route a message to him to determine if he could take both medications or if he should stop the colchicine in the meantime. I also informed him that he should reach out the [facility] to determine if there are other options for him that would not interact with the colchicine. He understood and had no further questions.      Jaiden Poole, ATC

## 2023-06-21 NOTE — TELEPHONE ENCOUNTER
[facility] Call Center    Phone Message    May a detailed message be left on voicemail: yes     Reason for Call: Other: Gerry called he said the azithromycin that he is taking for a sore throat and the pharmacist thought it would interact with the colchicine. Please call to discuss with patient.    Action Taken: Other: [facility]    Travel Screening: Not Applicable

## 2023-06-21 NOTE — TELEPHONE ENCOUNTER
Informed Gerry that per Dr. Torres, he should contact the [facility] where he was seen to inquire if there is another medication that he could take. Gerry understood and had no further questions.      Jaiden Poole, ATC

## 2023-06-27 ENCOUNTER — APPOINTMENT (OUTPATIENT)
Dept: URBAN - METROPOLITAN AREA CLINIC 260 | Age: 48
Setting detail: DERMATOLOGY
End: 2023-06-28

## 2023-06-27 VITALS — WEIGHT: 215 LBS | HEIGHT: 60 IN

## 2023-06-27 DIAGNOSIS — D18.0 HEMANGIOMA: ICD-10-CM

## 2023-06-27 DIAGNOSIS — L81.4 OTHER MELANIN HYPERPIGMENTATION: ICD-10-CM

## 2023-06-27 DIAGNOSIS — Z71.89 OTHER SPECIFIED COUNSELING: ICD-10-CM

## 2023-06-27 DIAGNOSIS — D22 MELANOCYTIC NEVI: ICD-10-CM

## 2023-06-27 DIAGNOSIS — L82.1 OTHER SEBORRHEIC KERATOSIS: ICD-10-CM

## 2023-06-27 DIAGNOSIS — L57.8 OTHER SKIN CHANGES DUE TO CHRONIC EXPOSURE TO NONIONIZING RADIATION: ICD-10-CM

## 2023-06-27 DIAGNOSIS — L64.8 OTHER ANDROGENIC ALOPECIA: ICD-10-CM

## 2023-06-27 PROBLEM — D22.5 MELANOCYTIC NEVI OF TRUNK: Status: ACTIVE | Noted: 2023-06-27

## 2023-06-27 PROBLEM — D18.01 HEMANGIOMA OF SKIN AND SUBCUTANEOUS TISSUE: Status: ACTIVE | Noted: 2023-06-27

## 2023-06-27 PROCEDURE — OTHER COUNSELING: OTHER

## 2023-06-27 PROCEDURE — 99213 OFFICE O/P EST LOW 20 MIN: CPT

## 2023-06-27 PROCEDURE — OTHER PRESCRIPTION: OTHER

## 2023-06-27 PROCEDURE — OTHER MIPS QUALITY: OTHER

## 2023-06-27 RX ORDER — MINOXIDIL 2.5 MG/1
TABLET ORAL QD
Qty: 180 | Refills: 3 | Status: ERX | COMMUNITY
Start: 2023-06-27

## 2023-06-27 ASSESSMENT — LOCATION DETAILED DESCRIPTION DERM
LOCATION DETAILED: LEFT SUPERIOR PARIETAL SCALP
LOCATION DETAILED: LEFT SUPERIOR MEDIAL UPPER BACK
LOCATION DETAILED: LEFT MEDIAL UPPER BACK

## 2023-06-27 ASSESSMENT — LOCATION ZONE DERM
LOCATION ZONE: TRUNK
LOCATION ZONE: SCALP

## 2023-06-27 ASSESSMENT — LOCATION SIMPLE DESCRIPTION DERM
LOCATION SIMPLE: SCALP
LOCATION SIMPLE: LEFT UPPER BACK

## 2023-07-07 DIAGNOSIS — M10.9 GOUTY ARTHRITIS OF LEFT GREAT TOE: ICD-10-CM

## 2023-07-07 DIAGNOSIS — M10.071 ACUTE IDIOPATHIC GOUT OF RIGHT ANKLE: Primary | ICD-10-CM

## 2023-07-07 NOTE — PROGRESS NOTES
1:15 PM: Contacted patient a couple times to discuss his chronic gout of the foot and ankle.  No answer from the patient.  I have sent him a [employer] message.  Instructions at this point will be to obtain a new uric acid level, the last was in late May, with a level of 7.3.  He has been increased to 200 mg of allopurinol around the same time.  We will recheck a uric acid level now which has been ordered.  We will follow-up the results with the patient from there and titrate medication as necessary.    Hal Torres D.O., [facility]  , Sports Medicine  Department of Family Medicine and [facility]

## 2023-07-16 ASSESSMENT — ENCOUNTER SYMPTOMS
CONSTIPATION: 0
JOINT SWELLING: 0
ABDOMINAL PAIN: 0
ARTHRALGIAS: 1
WEAKNESS: 0
CHILLS: 0
DIARRHEA: 0
PALPITATIONS: 0
HEMATOCHEZIA: 0
NAUSEA: 0
COUGH: 0
HEADACHES: 0
PARESTHESIAS: 0
FREQUENCY: 0
MYALGIAS: 0
DIZZINESS: 0
SORE THROAT: 0
HEARTBURN: 0
HEMATURIA: 0
NERVOUS/ANXIOUS: 0
FEVER: 0
DYSURIA: 0
EYE PAIN: 0
SHORTNESS OF BREATH: 0

## 2023-07-16 ASSESSMENT — ASTHMA QUESTIONNAIRES: ACT_TOTALSCORE: 20

## 2023-07-18 ENCOUNTER — OFFICE VISIT (OUTPATIENT)
Dept: FAMILY MEDICINE | Facility: CLINIC | Age: 48
End: 2023-07-18
Payer: COMMERCIAL

## 2023-07-18 VITALS
RESPIRATION RATE: 25 BRPM | BODY MASS INDEX: 34.79 KG/M2 | SYSTOLIC BLOOD PRESSURE: 119 MMHG | WEIGHT: 216.5 LBS | HEART RATE: 87 BPM | TEMPERATURE: 98.6 F | DIASTOLIC BLOOD PRESSURE: 72 MMHG | OXYGEN SATURATION: 98 % | HEIGHT: 66 IN

## 2023-07-18 DIAGNOSIS — Z12.11 SCREEN FOR COLON CANCER: ICD-10-CM

## 2023-07-18 DIAGNOSIS — M10.00 IDIOPATHIC GOUT, UNSPECIFIED CHRONICITY, UNSPECIFIED SITE: ICD-10-CM

## 2023-07-18 DIAGNOSIS — Z13.1 SCREENING FOR DIABETES MELLITUS: ICD-10-CM

## 2023-07-18 DIAGNOSIS — Z23 ENCOUNTER FOR IMMUNIZATION: ICD-10-CM

## 2023-07-18 DIAGNOSIS — G47.33 OSA (OBSTRUCTIVE SLEEP APNEA): ICD-10-CM

## 2023-07-18 DIAGNOSIS — Z00.00 ROUTINE HISTORY AND PHYSICAL EXAMINATION OF ADULT: Primary | ICD-10-CM

## 2023-07-18 DIAGNOSIS — J45.30 MILD PERSISTENT ASTHMA WITHOUT COMPLICATION: ICD-10-CM

## 2023-07-18 DIAGNOSIS — Z13.220 SCREENING CHOLESTEROL LEVEL: ICD-10-CM

## 2023-07-18 LAB — HBA1C MFR BLD: 5.3 % (ref 0–5.6)

## 2023-07-18 PROCEDURE — 90472 IMMUNIZATION ADMIN EACH ADD: CPT | Performed by: NURSE PRACTITIONER

## 2023-07-18 PROCEDURE — 36415 COLL VENOUS BLD VENIPUNCTURE: CPT | Performed by: NURSE PRACTITIONER

## 2023-07-18 PROCEDURE — 99396 PREV VISIT EST AGE 40-64: CPT | Mod: 25 | Performed by: NURSE PRACTITIONER

## 2023-07-18 PROCEDURE — 83036 HEMOGLOBIN GLYCOSYLATED A1C: CPT | Performed by: NURSE PRACTITIONER

## 2023-07-18 PROCEDURE — 90471 IMMUNIZATION ADMIN: CPT | Performed by: NURSE PRACTITIONER

## 2023-07-18 PROCEDURE — 80061 LIPID PANEL: CPT | Performed by: NURSE PRACTITIONER

## 2023-07-18 PROCEDURE — 99213 OFFICE O/P EST LOW 20 MIN: CPT | Mod: 25 | Performed by: NURSE PRACTITIONER

## 2023-07-18 PROCEDURE — 80053 COMPREHEN METABOLIC PANEL: CPT | Performed by: NURSE PRACTITIONER

## 2023-07-18 PROCEDURE — 84550 ASSAY OF BLOOD/URIC ACID: CPT | Performed by: NURSE PRACTITIONER

## 2023-07-18 PROCEDURE — 90715 TDAP VACCINE 7 YRS/> IM: CPT | Performed by: NURSE PRACTITIONER

## 2023-07-18 PROCEDURE — 90677 PCV20 VACCINE IM: CPT | Performed by: NURSE PRACTITIONER

## 2023-07-18 PROCEDURE — 90746 HEPB VACCINE 3 DOSE ADULT IM: CPT | Performed by: NURSE PRACTITIONER

## 2023-07-18 RX ORDER — MINOXIDIL 2.5 MG/1
TABLET ORAL
COMMUNITY
Start: 2023-06-30

## 2023-07-18 ASSESSMENT — ENCOUNTER SYMPTOMS
NAUSEA: 0
ABDOMINAL PAIN: 0
PALPITATIONS: 0
SORE THROAT: 0
FEVER: 0
CONSTIPATION: 0
CHILLS: 0
SHORTNESS OF BREATH: 0
HEADACHES: 0
HEMATOCHEZIA: 0
EYE PAIN: 0
DIARRHEA: 0
DIZZINESS: 0
JOINT SWELLING: 0
WEAKNESS: 0
HEARTBURN: 0
COUGH: 0
FREQUENCY: 0
ARTHRALGIAS: 1
MYALGIAS: 0
NERVOUS/ANXIOUS: 0
DYSURIA: 0
PARESTHESIAS: 0
HEMATURIA: 0

## 2023-07-18 ASSESSMENT — PAIN SCALES - GENERAL: PAINLEVEL: MILD PAIN (3)

## 2023-07-18 NOTE — PROGRESS NOTES
"SUBJECTIVE:   CC: Gerry is an 48 year old who presents for preventative health visit.       7/18/2023     1:35 PM   Additional Questions   Roomed by Chau Del Real   Accompanied by N/A     Healthy Habits:     Getting at least 3 servings of Calcium per day:  Yes    Bi-annual eye exam:  NO    Dental care twice a year:  Yes    Sleep apnea or symptoms of sleep apnea:  Sleep apnea    Diet:  Regular (no restrictions)    Frequency of exercise:  2-3 days/week    Duration of exercise:  30-45 minutes    Taking medications regularly:  Yes    Medication side effects:  None    Additional concerns today:  Yes    Pt would like to address sleep apnea options. He has a CPAP that he wears at home. He wonders if a tonsillectomy may be helpful for him, as he has always had large tonsils. He is wondering about non-mask options for TERRIE. 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 Currently drinking about 20 beers per week, which he feels is \"probably too much,\" but doesn't feel very motivated to cut down, as it is not currently impacting his work or family life. He notes that he used to drink much more when he was a .  He had a lot of trouble with gout throughout the winter, but symptoms are currently stable.  No recent breathing trouble-asthma symptoms stable.            Social History     Tobacco Use     Smoking status: Never     Smokeless tobacco: Never   Substance Use Topics     Alcohol use: Yes     Comment: varies; typically 20 per week             7/16/2023    10:39 AM   Alcohol Use   Prescreen: >3 drinks/day or >7 drinks/week? Yes   AUDIT SCORE  9         7/16/2023    10:39 AM   AUDIT - Alcohol Use Disorders Identification Test - Reproduced from the World Health Organization Audit 2001 (Second Edition)   1.  How often do you have a drink containing alcohol? 2 to 3 times a week   2.  How many drinks containing alcohol do you have on a " typical day when you are drinking? 3 or 4   3.  How often do you have five or more drinks on one occasion? Monthly   4.  How often during the last year have you found that you were not able to stop drinking once you had started? Never   5.  How often during the last year have you failed to do what was normally expected of you because of drinking? Never   6.  How often during the last year have you needed a first drink in the morning to get yourself going after a heavy drinking session? Never   7.  How often during the last year have you had a feeling of guilt or remorse after drinking? Never   8.  How often during the last year have you been unable to remember what happened the night before because of your drinking? Less than monthly   9.  Have you or someone else been injured because of your drinking? No   10. Has a relative, friend, doctor or other health care worker been concerned about your drinking or suggested you cut down? Yes, but not in the last year   TOTAL SCORE 9       Last PSA: No results found for: PSA        Reviewed and updated as needed this visit by clinical staff   Tobacco  Allergies  Meds   Med Hx   Fam Hx  Soc Hx        Reviewed and updated as needed this visit by Provider   Tobacco     Med Hx   Fam Hx  Soc Hx           Review of Systems   Constitutional: Negative for chills and fever.   HENT: Negative for congestion, ear pain, hearing loss and sore throat.    Eyes: Negative for pain and visual disturbance.   Respiratory: Negative for cough and shortness of breath.    Cardiovascular: Negative for chest pain, palpitations and peripheral edema.   Gastrointestinal: Negative for abdominal pain, constipation, diarrhea, heartburn, hematochezia and nausea.   Genitourinary: Negative for dysuria, frequency, genital sores, hematuria, impotence, penile discharge and urgency.   Musculoskeletal: Positive for arthralgias. Negative for joint swelling and myalgias.   Skin: Negative for rash.  "  Neurological: Negative for dizziness, weakness, headaches and paresthesias.   Psychiatric/Behavioral: Negative for mood changes. The patient is not nervous/anxious.        OBJECTIVE:   /72 (BP Location: Left arm, Patient Position: Sitting, Cuff Size: Adult Large)   Pulse 87   Temp 98.6  F (37  C) (Temporal)   Resp 25   Ht 1.686 m (5' 6.38\")   Wt 98.2 kg (216 lb 8 oz)   SpO2 98%   BMI 34.55 kg/m      Physical Exam  GENERAL: healthy, alert and no distress  EYES: Eyes grossly normal to inspection, PERRL and conjunctivae and sclerae normal  HENT: normal cephalic/atraumatic, ear canals and TM's normal, nose and mouth without ulcers or lesions, oropharynx clear, oral mucous membranes moist and tonsillar hypertrophy  NECK: no adenopathy, no asymmetry, masses, or scars and thyroid normal to palpation  RESP: lungs clear to auscultation - no rales, rhonchi or wheezes  CV: regular rate and rhythm, normal S1 S2, no S3 or S4, no murmur, click or rub, no peripheral edema and peripheral pulses strong  ABDOMEN: soft, nontender, no hepatosplenomegaly, no masses and bowel sounds normal  MS: no gross musculoskeletal defects noted, no edema  SKIN: no suspicious lesions or rashes  PSYCH: mentation appears normal, affect normal/bright    Diagnostic Test Results:  Labs reviewed in Epic  Results for orders placed or performed in visit on 07/18/23 (from the past 24 hour(s))   Uric acid   Result Value Ref Range    Uric Acid 5.8 3.4 - 7.0 mg/dL   Lipid panel reflex to direct LDL Fasting   Result Value Ref Range    Cholesterol 244 (H) <200 mg/dL    Triglycerides 204 (H) <150 mg/dL    Direct Measure HDL 48 >=40 mg/dL    LDL Cholesterol Calculated 155 (H) <=100 mg/dL    Non HDL Cholesterol 196 (H) <130 mg/dL    Narrative    Cholesterol  Desirable:  <200 mg/dL    Triglycerides  Normal:  Less than 150 mg/dL  Borderline High:  150-199 mg/dL  High:  200-499 mg/dL  Very High:  Greater than or equal to 500 mg/dL    Direct Measure " HDL  Female:  Greater than or equal to 50 mg/dL   Male:  Greater than or equal to 40 mg/dL    LDL Cholesterol  Desirable:  <100mg/dL  Above Desirable:  100-129 mg/dL   Borderline High:  130-159 mg/dL   High:  160-189 mg/dL   Very High:  >= 190 mg/dL    Non HDL Cholesterol  Desirable:  130 mg/dL  Above Desirable:  130-159 mg/dL  Borderline High:  160-189 mg/dL  High:  190-219 mg/dL  Very High:  Greater than or equal to 220 mg/dL   Comprehensive metabolic panel (BMP + Alb, Alk Phos, ALT, AST, Total. Bili, TP)   Result Value Ref Range    Sodium 137 136 - 145 mmol/L    Potassium 3.8 3.4 - 5.3 mmol/L    Chloride 100 98 - 107 mmol/L    Carbon Dioxide (CO2) 22 22 - 29 mmol/L    Anion Gap 15 7 - 15 mmol/L    Urea Nitrogen 10.8 6.0 - 20.0 mg/dL    Creatinine 0.72 0.67 - 1.17 mg/dL    Calcium 9.5 8.6 - 10.0 mg/dL    Glucose 71 70 - 99 mg/dL    Alkaline Phosphatase 62 40 - 129 U/L    AST 35 0 - 45 U/L    ALT 35 0 - 70 U/L    Protein Total 8.0 6.4 - 8.3 g/dL    Albumin 4.7 3.5 - 5.2 g/dL    Bilirubin Total 0.8 <=1.2 mg/dL    GFR Estimate >90 >60 mL/min/1.73m2   Hemoglobin A1c   Result Value Ref Range    Hemoglobin A1C 5.3 0.0 - 5.6 %       ASSESSMENT/PLAN:   (Z00.00) Routine history and physical examination of adult  (primary encounter diagnosis)  Comment:   Plan:     (Z12.11) Screen for colon cancer  Comment:   Plan: Colonoscopy Screening  Referral            (G47.33) TERRIE (obstructive sleep apnea) AHI 21.8  Comment:   Plan: Adult ENT  Referral            (Z13.1) Screening for diabetes mellitus  Comment:   Plan: Hemoglobin A1c            (Z13.220) Screening cholesterol level  Comment:   Plan: Lipid panel reflex to direct LDL Fasting,         Comprehensive metabolic panel (BMP + Alb, Alk         Phos, ALT, AST, Total. Bili, TP)            (M10.00) Idiopathic gout, unspecified chronicity, unspecified site  Comment: Discussed risks of heavy alcohol use  Plan: Uric acid            (Z23) Encounter for  "immunization  Comment:   Plan: HEPATITIS B VACCINE ADULT 3 DOSE IM         (ENGERIX-B/RECOMBIVAX HB), Pneumococcal 20         Valent Conjugate (Prevnar 20), TDAP VACCINE         (Adacel, Boostrix)            (J45.30) Mild persistent asthma without complication  Comment: stable  Plan:     The 10-year ASCVD risk score (Clayton SAEED, et al., 2019) is: 2.9%    Values used to calculate the score:      Age: 48 years      Sex: Male      Is Non- : No      Diabetic: No      Tobacco smoker: No      Systolic Blood Pressure: 119 mmHg      Is BP treated: No      HDL Cholesterol: 48 mg/dL      Total Cholesterol: 244 mg/dL    Patient has been advised of split billing requirements and indicates understanding: Yes      COUNSELING:   Reviewed preventive health counseling, as reflected in patient instructions       Regular exercise       Healthy diet/nutrition       Alcohol Use       BMI:   Estimated body mass index is 34.55 kg/m  as calculated from the following:    Height as of this encounter: 1.686 m (5' 6.38\").    Weight as of this encounter: 98.2 kg (216 lb 8 oz).   Weight management plan: Discussed healthy diet and exercise guidelines      He reports that he has never smoked. He has never used smokeless tobacco.        CHARO Shepard CNP  [facility]  "

## 2023-07-19 LAB
ALBUMIN SERPL BCG-MCNC: 4.7 G/DL (ref 3.5–5.2)
ALP SERPL-CCNC: 62 U/L (ref 40–129)
ALT SERPL W P-5'-P-CCNC: 35 U/L (ref 0–70)
ANION GAP SERPL CALCULATED.3IONS-SCNC: 15 MMOL/L (ref 7–15)
AST SERPL W P-5'-P-CCNC: 35 U/L (ref 0–45)
BILIRUB SERPL-MCNC: 0.8 MG/DL
BUN SERPL-MCNC: 10.8 MG/DL (ref 6–20)
CALCIUM SERPL-MCNC: 9.5 MG/DL (ref 8.6–10)
CHLORIDE SERPL-SCNC: 100 MMOL/L (ref 98–107)
CHOLEST SERPL-MCNC: 244 MG/DL
CREAT SERPL-MCNC: 0.72 MG/DL (ref 0.67–1.17)
DEPRECATED HCO3 PLAS-SCNC: 22 MMOL/L (ref 22–29)
GFR SERPL CREATININE-BSD FRML MDRD: >90 ML/MIN/1.73M2
GLUCOSE SERPL-MCNC: 71 MG/DL (ref 70–99)
HDLC SERPL-MCNC: 48 MG/DL
LDLC SERPL CALC-MCNC: 155 MG/DL
NONHDLC SERPL-MCNC: 196 MG/DL
POTASSIUM SERPL-SCNC: 3.8 MMOL/L (ref 3.4–5.3)
PROT SERPL-MCNC: 8 G/DL (ref 6.4–8.3)
SODIUM SERPL-SCNC: 137 MMOL/L (ref 136–145)
TRIGL SERPL-MCNC: 204 MG/DL
URATE SERPL-MCNC: 5.8 MG/DL (ref 3.4–7)

## 2023-08-24 ENCOUNTER — HOSPITAL ENCOUNTER (OUTPATIENT)
Facility: CLINIC | Age: 48
End: 2023-08-24
Attending: INTERNAL MEDICINE | Admitting: INTERNAL MEDICINE
Payer: COMMERCIAL

## 2023-08-24 ENCOUNTER — TELEPHONE (OUTPATIENT)
Dept: SLEEP MEDICINE | Facility: CLINIC | Age: 48
End: 2023-08-24
Payer: COMMERCIAL

## 2023-08-24 ENCOUNTER — TELEPHONE (OUTPATIENT)
Dept: GASTROENTEROLOGY | Facility: CLINIC | Age: 48
End: 2023-08-24
Payer: COMMERCIAL

## 2023-08-24 NOTE — TELEPHONE ENCOUNTER
General Call    Contacts         Type Contact Phone/Fax    08/24/2023 11:46 AM CDT Phone (Incoming) Gerry Palafox (Self) [phone] ()          Reason for Call:  sleep study    What are your questions or concerns:  Gerry would like to see a ENT for other sleep device options. Large tonsils ENT will not see him unless he has a recent sleep study.  Gerry was wondering how he can get in sooner    Date of last appointment with provider: 2018 a different sleep provider    Could we send this information to you in [facility] or would you prefer to receive a phone call?:   Patient would prefer a phone call   Okay to leave a detailed message?: Yes at Home number on file [phone] (home)

## 2023-08-24 NOTE — TELEPHONE ENCOUNTER
"Endoscopy Scheduling Screen    Have you had a positive Covid test in the last 14 days?  Yes (Schedule at least 14 days from symptom onset)    Are you active on MyChart?   Yes    What insurance is in the chart?  Other:  PO    Ordering/Referring Provider:     KHUSHBOO NORRIS      (If ordering provider performs procedure, schedule with ordering provider unless otherwise instructed. )    BMI: Estimated body mass index is 34.55 kg/m  as calculated from the following:    Height as of 7/18/23: 1.686 m (5' 6.38\").    Weight as of 7/18/23: 98.2 kg (216 lb 8 oz).     Sedation Ordered  moderate sedation.   If patient BMI > 50 do not schedule in ASC.    Are you taking any prescription medications for pain?   No    Are you taking methadone or Suboxone?  No    Do you have a history of malignant hyperthermia or adverse reaction to anesthesia?  No    (Females) Are you currently pregnant?        Have you been diagnosed or told you have pulmonary hypertension?   No    Do you have an LVAD?  No    Have you been told you have moderate to severe sleep apnea?  Yes (RN Review required for scheduling unless scheduling in Hospital.)    Have you been told you have COPD, asthma, or any other lung disease?  Yes     What breathing problems do you have?  Asthma     Do you use home oxygen?  No    Have your breathing problems required an ED visit or hospitalization in the last year?  No    Do you have any heart conditions?  No     Have you ever had or are you awaiting a heart or lung transplant?   No    Have you had a stroke or transient ischemic attack (TIA aka \"mini stroke\" in the last 6 months?   No    Have you been diagnosed with or been told you have cirrhosis of the liver?   No    Are you currently on dialysis?   No    Do you need assistance transferring?   No    BMI: Estimated body mass index is 34.55 kg/m  as calculated from the following:    Height as of 7/18/23: 1.686 m (5' 6.38\").    Weight as of 7/18/23: 98.2 kg (216 lb 8 oz). " Pt to pt  in wheelchair per volunteer for d/c home in private vehicle with spouse.  Right radial site free of bleeding/hematoma at time of d/c     Is patients BMI > 40 and scheduling location [facility]?  No    Do you take the medication Phentermine, Ozempic or Wegovy?  No    Do you take the medication Naltrexone?  No    Do you take blood thinners?  No      Prep   Are you currently on dialysis or do you have chronic kidney disease?  No    Do you have a diagnosis of diabetes?  No    Do you have a diagnosis of cystic fibrosis (CF)?  No    On a regular basis do you go 3 -5 days between bowel movements?  No    BMI > 40?  No    Preferred Pharmacy:      [id number] [address]  [address] [facility] [address]  Phone: [phone] Fax: [phone]      Final Scheduling Details   Colonoscopy prep sent?  MiraLAX (No Mag Citrate)    Procedure scheduled  Colonoscopy    Surgeon:  MAGDI     Date of procedure:  11/30     Schedule PAC:   No    Location  [facility]    Sedation   Moderate Sedation    Patient Reminders:   You will receive a call from a Nurse to review instructions and health history.  This assessment must be completed prior to your procedure.  Failure to complete the Nurse assessment may result in the procedure being cancelled.      On the day of your procedure, please designate an adult(s) who can drive you home stay with you for the next 24 hours. The medicines used in the exam will make you sleepy. You will not be able to drive.      You cannot take public transportation, ride share services, or non-medical taxi service without a responsible caregiver.  Medical transport services are allowed with the requirement that a responsible caregiver will receive you at your destination.  We require that drivers and caregivers are confirmed prior to your procedure.

## 2023-09-07 ENCOUNTER — ANCILLARY PROCEDURE (OUTPATIENT)
Dept: GENERAL RADIOLOGY | Facility: CLINIC | Age: 48
End: 2023-09-07
Attending: PEDIATRICS
Payer: COMMERCIAL

## 2023-09-07 ENCOUNTER — OFFICE VISIT (OUTPATIENT)
Dept: ORTHOPEDICS | Facility: CLINIC | Age: 48
End: 2023-09-07
Payer: COMMERCIAL

## 2023-09-07 VITALS — HEIGHT: 66 IN | BODY MASS INDEX: 34.72 KG/M2 | WEIGHT: 216 LBS

## 2023-09-07 DIAGNOSIS — M79.675 GREAT TOE PAIN, LEFT: ICD-10-CM

## 2023-09-07 DIAGNOSIS — M10.9 GOUTY ARTHRITIS OF LEFT GREAT TOE: ICD-10-CM

## 2023-09-07 DIAGNOSIS — M79.675 GREAT TOE PAIN, LEFT: Primary | ICD-10-CM

## 2023-09-07 PROCEDURE — 73630 X-RAY EXAM OF FOOT: CPT | Mod: TC | Performed by: RADIOLOGY

## 2023-09-07 PROCEDURE — 99214 OFFICE O/P EST MOD 30 MIN: CPT | Performed by: PEDIATRICS

## 2023-09-07 RX ORDER — PREDNISONE 20 MG/1
40 TABLET ORAL DAILY
Qty: 10 TABLET | Refills: 0 | Status: SHIPPED | OUTPATIENT
Start: 2023-09-07 | End: 2023-09-12

## 2023-09-07 RX ORDER — PREDNISONE 20 MG/1
40 TABLET ORAL DAILY
Qty: 10 TABLET | Refills: 0 | Status: SHIPPED | OUTPATIENT
Start: 2023-09-07 | End: 2023-09-07

## 2023-09-07 NOTE — PATIENT INSTRUCTIONS
We discussed these other possible diagnosis: Likely a flare of gout, will treat with prednisone burst.    Plan:  - Today's Plan of Care:  Prescription Medication as directed: Prednisone (I reviewed the mechanism of action as well as risk/benefit profile of medications. Questions answered.)    Could immobilize in walking boot if needed    Continue with relative rest and activity modification, Ice, Compression, and Elevation.  Can apply ice 10-15 minutes 3-4 times per day as needed. OTC medications as needed.    Follow Up: with Dr. Torres    If you have any further questions for your physician or physician s care team you can call [phone] and use option 3 to leave a voice message.

## 2023-09-07 NOTE — PROGRESS NOTES
ASSESSMENT & PLAN    Gerry was seen today for pain.    Diagnoses and all orders for this visit:    Great toe pain, left  -     XR Foot LT G/E 3 vw; Future  -     Discontinue: predniSONE (DELTASONE) 20 MG tablet; Take 2 tablets (40 mg) by mouth daily for 5 days  -     predniSONE (DELTASONE) 20 MG tablet; Take 2 tablets (40 mg) by mouth daily for 5 days    Gouty arthritis of left great toe  -     Discontinue: predniSONE (DELTASONE) 20 MG tablet; Take 2 tablets (40 mg) by mouth daily for 5 days  -     predniSONE (DELTASONE) 20 MG tablet; Take 2 tablets (40 mg) by mouth daily for 5 days      This issue is acute on chronic and Unchanged.      ICD-10-CM    1. Great toe pain, left  M79.675 XR Foot LT G/E 3 vw     predniSONE (DELTASONE) 20 MG tablet     DISCONTINUED: predniSONE (DELTASONE) 20 MG tablet      2. Gouty arthritis of left great toe  M10.9 predniSONE (DELTASONE) 20 MG tablet     DISCONTINUED: predniSONE (DELTASONE) 20 MG tablet          We discussed these other possible diagnosis: Likely a flare of gout, will treat with prednisone burst.    Plan:  - Today's Plan of Care:  Prescription Medication as directed: Prednisone (I reviewed the mechanism of action as well as risk/benefit profile of medications. Questions answered.)    Could immobilize in walking boot if needed    Continue with relative rest and activity modification, Ice, Compression, and Elevation.  Can apply ice 10-15 minutes 3-4 times per day as needed. OTC medications as needed.    Follow Up: with Dr. Torres    Concerning signs and symptoms were reviewed and all questions were answered at this time.    Gaye Roldan MD [facility]  Sports Medicine Physician  [facility]    -----  Chief Complaint   Patient presents with    Left Foot - Pain       SUBJECTIVE  Gerry Palafox is a/an 48 year old male who is seen as a WALK IN patient for evaluation of left great toe.     The patient is seen by themselves.    Onset: 1 day(s) ago. Reports insidious  "onset without acute precipitating event.  Location of Pain: left great toe, pain and hard to move  Worsened by: walking on the foot  Better with:  Treatments tried: naproxen, cherry concentrate and tumeric, ibuprofen  Associated symptoms: swelling    - Has a history of gout in multiple joints, managed by Dr. Torres. Has been treated with injections and oral prednisone in the past.    Orthopedic/Surgical history: NO  Social History/Occupation: [employer] employee      REVIEW OF SYSTEMS:  Review of Systems    OBJECTIVE:  Ht 1.676 m (5' 6\")   Wt 98 kg (216 lb)   BMI 34.86 kg/m     General: healthy, alert and in no distress  Skin: no suspicious lesions or rash.  CV: distal perfusion intact   Resp: normal respiratory effort without conversational dyspnea   Psych: normal mood and affect  Gait: slightly antalgic  Neuro: Normal light sensory exam of lower extremity    Bilateral Foot and Ankle Exam:    Inspection:       edema noted left great toe with some erythema    Foot inspection:       no deformity noted    Tender:       First MTP joint on the left    Non-Tender:       remainder of ankle and foot bilateral    ROM:        Full active and passive ROM, ankle dorsiflexion, plantarflexion, inversion, eversion, some stiffness with great toe flexion and extension left    Strength:       ankle dorsiflexion 5/5 bilateral       plantarflexion 5/5 bilateral       inversion 5/5 bilateral       eversion 5/5 bilateral    Gait:  Slightly antalgic    Neurovascular:       2+ peripheral pulses bilaterally and brisk capillary refill       sensation grossly intact      RADIOLOGY:  Final results and radiologist's interpretation, available in the [facility] health record.  Images were reviewed with the patient in the office today.  My personal interpretation of the performed imaging:   3 XR views of left foot reviewed: no acute bony abnormality, no significant degenerative change  - will follow official read    Most recent uric acid - 5.8 " 7/18/2023    Review of prior external note(s) from - [facility]  Review of the result(s) of each unique test - XR

## 2023-09-07 NOTE — LETTER
9/7/2023         RE: Gerry Palafox  [address] [place] [address]        Dear Colleague,    Thank you for referring your patient, Gerry Palafox, to the [facility]. Please see a copy of my visit note below.    ASSESSMENT & PLAN    Gerry was seen today for pain.    Diagnoses and all orders for this visit:    Great toe pain, left  -     XR Foot LT G/E 3 vw; Future  -     Discontinue: predniSONE (DELTASONE) 20 MG tablet; Take 2 tablets (40 mg) by mouth daily for 5 days  -     predniSONE (DELTASONE) 20 MG tablet; Take 2 tablets (40 mg) by mouth daily for 5 days    Gouty arthritis of left great toe  -     Discontinue: predniSONE (DELTASONE) 20 MG tablet; Take 2 tablets (40 mg) by mouth daily for 5 days  -     predniSONE (DELTASONE) 20 MG tablet; Take 2 tablets (40 mg) by mouth daily for 5 days      This issue is acute on chronic and Unchanged.      ICD-10-CM    1. Great toe pain, left  M79.675 XR Foot LT G/E 3 vw     predniSONE (DELTASONE) 20 MG tablet     DISCONTINUED: predniSONE (DELTASONE) 20 MG tablet      2. Gouty arthritis of left great toe  M10.9 predniSONE (DELTASONE) 20 MG tablet     DISCONTINUED: predniSONE (DELTASONE) 20 MG tablet          We discussed these other possible diagnosis: Likely a flare of gout, will treat with prednisone burst.    Plan:  - Today's Plan of Care:  Prescription Medication as directed: Prednisone (I reviewed the mechanism of action as well as risk/benefit profile of medications. Questions answered.)    Could immobilize in walking boot if needed    Continue with relative rest and activity modification, Ice, Compression, and Elevation.  Can apply ice 10-15 minutes 3-4 times per day as needed. OTC medications as needed.    Follow Up: with Dr. Torres    Concerning signs and symptoms were reviewed and all questions were answered at this time.    Gaye Roldan MD [facility]  Sports Medicine Physician  [facility]    -----  Chief  "Complaint   Patient presents with     Left Foot - Pain       SUBJECTIVE  Gerry Palafox is a/an 48 year old male who is seen as a WALK IN patient for evaluation of left great toe.     The patient is seen by themselves.    Onset: 1 day(s) ago. Reports insidious onset without acute precipitating event.  Location of Pain: left great toe, pain and hard to move  Worsened by: walking on the foot  Better with:  Treatments tried: naproxen, cherry concentrate and tumeric, ibuprofen  Associated symptoms: swelling    - Has a history of gout in multiple joints, managed by Dr. Torres. Has been treated with injections and oral prednisone in the past.    Orthopedic/Surgical history: NO  Social History/Occupation: [employer] employee      REVIEW OF SYSTEMS:  Review of Systems    OBJECTIVE:  Ht 1.676 m (5' 6\")   Wt 98 kg (216 lb)   BMI 34.86 kg/m     General: healthy, alert and in no distress  Skin: no suspicious lesions or rash.  CV: distal perfusion intact   Resp: normal respiratory effort without conversational dyspnea   Psych: normal mood and affect  Gait: slightly antalgic  Neuro: Normal light sensory exam of lower extremity    Bilateral Foot and Ankle Exam:    Inspection:       edema noted left great toe with some erythema    Foot inspection:       no deformity noted    Tender:       First MTP joint on the left    Non-Tender:       remainder of ankle and foot bilateral    ROM:        Full active and passive ROM, ankle dorsiflexion, plantarflexion, inversion, eversion, some stiffness with great toe flexion and extension left    Strength:       ankle dorsiflexion 5/5 bilateral       plantarflexion 5/5 bilateral       inversion 5/5 bilateral       eversion 5/5 bilateral    Gait:  Slightly antalgic    Neurovascular:       2+ peripheral pulses bilaterally and brisk capillary refill       sensation grossly intact      RADIOLOGY:  Final results and radiologist's interpretation, available in the [facility] health record.  Images were reviewed " with the patient in the office today.  My personal interpretation of the performed imaging:   3 XR views of left foot reviewed: no acute bony abnormality, no significant degenerative change  - will follow official read    Most recent uric acid - 5.8 7/18/2023    Review of prior external note(s) from - [facility]  Review of the result(s) of each unique test - XR             Again, thank you for allowing me to participate in the care of your patient.        Sincerely,        Gaye Roldan MD

## 2023-09-10 DIAGNOSIS — M10.071 ACUTE IDIOPATHIC GOUT OF RIGHT ANKLE: ICD-10-CM

## 2023-09-12 NOTE — TELEPHONE ENCOUNTER
allopurinol (ZYLOPRIM) 100 MG tablet     Take 2 tablets (200 mg) by mouth daily TAKE ONE AND ONE-HALF TABLETS BY MOUTH EVERY DAY   Last Written Prescription Date:  5/30/23  Last Fill Quantity: 90,   # refills: 1  Last Office Visit : 9/7/23  Future Office visit:  None  CMP  and uric acid done 7/18/23  Routing refill request to provider for review/approval because:  Drug not on the [facility], [facility] or [facility] refill protocol  for Sports med/ Ortho

## 2023-09-13 RX ORDER — ALLOPURINOL 100 MG/1
150 TABLET ORAL DAILY
Qty: 135 TABLET | Refills: 3 | Status: SHIPPED | OUTPATIENT
Start: 2023-09-13 | End: 2024-08-13

## 2023-10-06 ASSESSMENT — SLEEP AND FATIGUE QUESTIONNAIRES
HOW LIKELY ARE YOU TO NOD OFF OR FALL ASLEEP WHILE SITTING AND READING: HIGH CHANCE OF DOZING
HOW LIKELY ARE YOU TO NOD OFF OR FALL ASLEEP IN A CAR, WHILE STOPPED FOR A FEW MINUTES IN TRAFFIC: WOULD NEVER DOZE
HOW LIKELY ARE YOU TO NOD OFF OR FALL ASLEEP WHILE LYING DOWN TO REST IN THE AFTERNOON WHEN CIRCUMSTANCES PERMIT: HIGH CHANCE OF DOZING
HOW LIKELY ARE YOU TO NOD OFF OR FALL ASLEEP WHILE SITTING AND TALKING TO SOMEONE: WOULD NEVER DOZE
HOW LIKELY ARE YOU TO NOD OFF OR FALL ASLEEP WHILE SITTING QUIETLY AFTER LUNCH WITHOUT ALCOHOL: SLIGHT CHANCE OF DOZING
HOW LIKELY ARE YOU TO NOD OFF OR FALL ASLEEP WHEN YOU ARE A PASSENGER IN A CAR FOR AN HOUR WITHOUT A BREAK: MODERATE CHANCE OF DOZING
HOW LIKELY ARE YOU TO NOD OFF OR FALL ASLEEP WHILE SITTING INACTIVE IN A PUBLIC PLACE: SLIGHT CHANCE OF DOZING
HOW LIKELY ARE YOU TO NOD OFF OR FALL ASLEEP WHILE WATCHING TV: MODERATE CHANCE OF DOZING

## 2023-10-09 ENCOUNTER — OFFICE VISIT (OUTPATIENT)
Dept: SLEEP MEDICINE | Facility: CLINIC | Age: 48
End: 2023-10-09
Attending: NURSE PRACTITIONER
Payer: COMMERCIAL

## 2023-10-09 VITALS
DIASTOLIC BLOOD PRESSURE: 72 MMHG | SYSTOLIC BLOOD PRESSURE: 125 MMHG | HEIGHT: 66 IN | BODY MASS INDEX: 34.86 KG/M2 | OXYGEN SATURATION: 99 % | HEART RATE: 64 BPM

## 2023-10-09 DIAGNOSIS — J03.01 RECURRENT STREPTOCOCCAL TONSILLITIS: ICD-10-CM

## 2023-10-09 DIAGNOSIS — G47.33 OSA (OBSTRUCTIVE SLEEP APNEA): Primary | ICD-10-CM

## 2023-10-09 PROCEDURE — 99203 OFFICE O/P NEW LOW 30 MIN: CPT

## 2023-10-09 RX ORDER — NAPROXEN 500 MG/1
TABLET ORAL
COMMUNITY
Start: 2023-02-27 | End: 2024-05-15

## 2023-10-09 NOTE — TELEPHONE ENCOUNTER
FUTURE VISIT INFORMATION      FUTURE VISIT INFORMATION:  Date: 11/22/23  Time: 8AM  Location: [facility] [place] [facility]  REFERRAL INFORMATION:  Referring provider:  Eufemia Martinez APRN CNP  Referring providers clinic:  [facility]  Reason for visit/diagnosis  Per Pt, dx tonsillitis and recurrent strep, referral from Eufemia Martinez APRN CNP recs in epic     RECORDS REQUESTED FROM:       Clinic name Comments Records Status Imaging Status   [facility] 10/9/23- OV Eufemia Gleason APRN CNP EPIC     [facility]  5/6/23- OV Milad Roe MD    1/10/23- OV Margo Garsia MD  CE

## 2023-10-09 NOTE — PATIENT INSTRUCTIONS
[facility]  Search engine: https://mms.aadsm.org/members/directory/search_bootstrap.php?org_id=ADSM&   Certified in Dental Sleep Medicine    Benjy Vasquez  Degree: DDS  [address]  [address]  [place] [address]  Professional Phone: [phone]  Website: [email or web address]    Wolf Unger  Degree: DDS  [facility]  [address]  [address]  [place] [address]  Professional Phone: [phone]: [phone]    Jess Ross  [facility]  [address]  [place] [address]  Professional Phone: [phone]  Website: [email or web address]      Jesu Peters  Degree: DDS  [address]  [address]  [place] [address]  Professional Phone: [phone]  Fax: [phone]    Mahamed Hinojosa  Degree: DDS  [facility]  [address]  [address]  [place] [address]  Professional Phone: [phone]  Website: [email or web address]      [facility]  [facility]  [address]  [address]  [place] [address]  Professional Phone: [phone]  Website: [email or web address]      Leatha Lozada  Degree: DDS  [facility]  [address]  [address]  [place] [address]  Professional Phone: [phone]     Mica Bishop  Degree: DDS, PhD  [facility]  [address] [place] [address] [id number] [place] [address]   [address]   [address]   [place] [address]   Appointments: [phone]   Fax: [phone]   [facility]   [address]   [address]   [place] [address]   Appointments: [phone]   Fax: [phone]                Eliazar Arriaga  Degree: DDS  [address] [place] [address]  Professional Phone: [phone]  Fax: [phone]  Website: [email or web address]      Rodriguezcelestina Cano  Degree: BARAK  [facility]  [address] [place] [address]    Darcieona Mulet Pradera  Degree: MS BARAK  [facility]  [address] [place] [address]  Professional Phone: [phone]      Trang Batista  Degree: MS BARAK  [facility]  [address]  [address]  [place] [address]  Professional Phone: [phone]    Inna Avila  Degree: BARAK  [facility]  [address]  [address]  [place] [address]  [place] [facility]     Carmine Campuzano  Degree: BARAK  [facility]  [address] [place] [address]  Professional Phone: [phone]  Website: [email or web address]      Wing Chandler  Degree: BARAK  [facility]  [address] [place] [address]  Professional Phone: [phone]  Fax: [phone]      Jason Camarillo  Degree: BARAK [facility]  [address]  [address]  [place] [address]    ACCEPT MEDICARE    Lalito Thompson DDS  [address], [place] [address]  [phone]; [phone] (fax)  kalidea    Murali Izaguirre DDS, MS   [facility]   [address]   [address]   [place] [address]   Appointments: [phone]   Fax: [phone]     ADDITIONAL PROVIDERS    Kaveh Perdomo DDS   [facility]   [address]   [address]   [place] [address]   Appointments: [phone]   Fax: [phone]       Andrea Dumont DDS, MS   [facility]  [address]  [place] [address]   Appointments: [phone] Ext: [phone]  Fax: [phone]   [email or web address]           MY TREATMENT INFORMATION FOR SLEEP APNEA-  Gerry Palafox    DOCTOR : CHARO Burns CNP    Am I having a sleep study at a sleep center?  --->Due to normal delays, you will be contacted within 2-4 weeks to  "schedule    Am I having a home sleep study?  --->Watch the video for the device you are using:    -/drop off device-   https://www.youSimpli.fiube.com/watch?v=yGGFBdELGhk    -Disposable device sent out require phone/computer application-   https://www.Cianna Medicalube.com/watch?v=BCce_vbiwxE    Frequently asked questions:  1. What is Obstructive Sleep Apnea (TERRIE)? TERRIE is the most common type of sleep apnea. Apnea means, \"without breath.\"  Apnea is most often caused by narrowing or collapse of the upper airway as muscles relax during sleep.   Almost everyone has occasional apneas. Most people with sleep apnea have had brief interruptions at night frequently for many years.  The severity of sleep apnea is related to how frequent and severe the events are.   2. What are the consequences of TERRIE? Symptoms include: feeling sleepy during the day, snoring loudly, gasping or stopping of breathing, trouble sleeping, and occasionally morning headaches or heartburn at night.  Sleepiness can be serious and even increase the risk of falling asleep while driving. Other health consequences may include development of high blood pressure and other cardiovascular disease in persons who are susceptible. Untreated TERRIE can contribute to heart disease, stroke and diabetes.   3. What are the treatment options? In most situations, sleep apnea is a lifelong disease that must be managed with daily therapy. Medications are not effective for sleep apnea and surgery is generally not considered until other therapies have been tried. Your treatment is your choice . Continuous Positive Airway (CPAP) works right away and is the therapy that is effective in nearly everyone. An oral device to hold your jaw forward is usually the next most reliable option. Other options include postioning devices (to keep you off your back), weight loss, and surgery including a tongue pacing device. There is more detail about some of these options below.  4. Are my sleep studies " covered by insurance? Although we will request verification of coverage, we advise you also check in advance of the study to ensure there is coverage.    Important tips for those choosing CPAP and similar devices  For new devices, sign up for device ELOY to monitor your device for your followup visits  We encourage you to utilize the Trainfox eloy or website (myAir web (resmed.com) ) to monitor your therapy progress and share the data with your healthcare team when you discuss your sleep apnea.                                                    Know your equipment:  CPAP is continuous positive airway pressure that prevents obstructive sleep apnea by keeping the throat from collapsing while you are sleeping. In most cases, the device is  smart  and can slowly self-adjusts if your throat collapses and keeps a record every day of how well you are treated-this information is available to you and your care team.  BPAP is bilevel positive airway pressure that keeps your throat open and also assists each breath with a pressure boost to maintain adequate breathing.  Special kinds of BPAP are used in patients who have inadequate breathing from lung or heart disease. In most cases, the device is  smart  and can slowly self-adjusts to assist breathing. Like CPAP, the device keeps a record of how well you are treated.  Your mask is your connection to the device. You get to choose what feels most comfortable and the staff will help to make sure if fits. Here: are some examples of the different masks that are available:       Key points to remember on your journey with sleep apnea:  Sleep study.  PAP devices often need to be adjusted during a sleep study to show that they are effective and adjusted right.  Good tips to remember: Try wearing just the mask during a quiet time during the day so your body adapts to wearing it. A humidifier is recommended for comfort in most cases to prevent drying of your nose and throat.  Allergy medication from your provider may help you if you are having nasal congestion.  Getting settled-in. It takes more than one night for most of us to get used to wearing a mask. Try wearing just the mask during a quiet time during the day so your body adapts to wearing it. A humidifier is recommended for comfort in most cases. Our team will work with you carefully on the first day and will be in contact within 4 days and again at 2 and 4 weeks for advice and remote device adjustments. Your therapy is evaluated by the device each day.   Use it every night. The more you are able to sleep naturally for 7-8 hours, the more likely you will have good sleep and to prevent health risks or symptoms from sleep apnea. Even if you use it 4 hours it helps. Occasionally all of us are unable to use a medical therapy, in sleep apnea, it is not dangerous to miss one night.   Communicate. Call our skilled team on the number provided on the first day if your visit for problems that make it difficult to wear the device. Over 2 out of 3 patients can learn to wear the device long-term with help from our team. Remember to call our team or your sleep providers if you are unable to wear the device as we may have other solutions for those who cannot adapt to mask CPAP therapy. It is recommended that you sleep your sleep provider within the first 3 months and yearly after that if you are not having problems.   Use it for your health. We encourage use of CPAP masks during daytime quiet periods to allow your face and brain to adapt to the sensation of CPAP so that it will be a more natural sensation to awaken to at night or during naps. This can be very useful during the first few weeks or months of adapting to CPAP though it does not help medically to wear CPAP during wakefulness and  should not be used as a strategy just to meet guidelines.  Take care of your equipment. Make sure you clean your mask and tubing using directions every day  and that your filter and mask are replaced as recommended or if they are not working.     BESIDES CPAP, WHAT OTHER THERAPIES ARE THERE?    Positioning Device  Positioning devices are generally used when sleep apnea is mild and only occurs on your back.This example shows a pillow that straps around the waist. It may be appropriate for those whose sleep study shows milder sleep apnea that occurs primarily when lying flat on one's back. Preliminary studies have shown benefit but effectiveness at home may need to be verified by a home sleep test. These devices are generally not covered by medical insurance.  Examples of devices that maintain sleeping on the back to prevent snoring and mild sleep apnea.    Belt type body positioner  http://Paladion/    Electronic reminder  http://nightshifttherapy.com/            Oral Appliance  What is oral appliance therapy?  An oral appliance device fits on your teeth at night like a retainer used after having braces. The device is made by a specialized dentist and requires several visits over 1-2 months before a manufactured device is made to fit your teeth and is adjusted to prevent your sleep apnea. Once an oral device is working properly, snoring should be improved. A home sleep test may be recommended at that time if to determine whether the sleep apnea is adequately treated.       Some things to remember:  -Oral devices are often, but not always, covered by your medical insurance. Be sure to check with your insurance provider.   -If you are referred for oral therapy, you will be given a list of specialized dentists to consider or you may choose to visit the Web site of the American Academy of Dental Sleep Medicine  -Oral devices are less likely to work if you have severe sleep apnea or are extremely overweight.     More detailed information  An oral appliance is a small acrylic device that fits over the upper and lower teeth  (similar to a retainer or a mouth guard). This  device slightly moves jaw forward, which moves the base of the tongue forward, opens the airway, improves breathing for effective treat snoring and obstructive sleep apnea in perhaps 7 out of 10 people .  The best working devices are custom-made by a dental device  after a mold is made of the teeth 1, 2, 3.  When is an oral appliance indicated?  Oral appliance therapy is recommended as a first-line treatment for patients with primary snoring, mild sleep apnea, and for patients with moderate sleep apnea who prefer appliance therapy to use of CPAP4, 5. Severity of sleep apnea is determined by sleep testing and is based on the number of respiratory events per hour of sleep.   How successful is oral appliance therapy?  The success rate of oral appliance therapy in patients with mild sleep apnea is 75-80% while in patients with moderate sleep apnea it is 50-70%. The chance of success in patients with severe sleep apnea is 40-50%. The research also shows that oral appliances have a beneficial effect on the cardiovascular health of TERRIE patients at the same magnitude as CPAP therapy7.  Oral appliances should be a second-line treatment in cases of severe sleep apnea, but if not completely successful then a combination therapy utilizing CPAP plus oral appliance therapy may be effective. Oral appliances tend to be effective in a broad range of patients although studies show that the patients who have the highest success are females, younger patients, those with milder disease, and less severe obesity. 3, 6.   Finding a dentist that practices dental sleep medicine  Specific training is available through the American Academy of Dental Sleep Medicine for dentists interested in working in the field of sleep. To find a dentist who is educated in the field of sleep and the use of oral appliances, near you, visit the Web site of the American Academy of Dental Sleep Medicine.    References  1. robel Fernando al.  Objectively measured vs self-reported compliance during oral appliance therapy for sleep-disordered breathing. Chest 2013; 144(5): 7098-5998.  2. Taj, et al. Objective measurement of compliance during oral appliance therapy for sleep-disordered breathing. Thorax 2013; 68(1): 91-96.  3. César et al. Mandibular advancement devices in 620 men and women with TERRIE and snoring: tolerability and predictors of treatment success. Chest 2004; 125: 1445-7972.  4. Albino et al. Oral appliances for snoring and TERRIE: a review. Sleep 2006; 29: 244-262.  5. Sarwat et al. Oral appliance treatment for TERRIE: an update. J Clin Sleep Med 2014; 10(2): 215-227.  6. Geronimo et al. Predictors of OSAH treatment outcome. J Dent Res 2007; 86: 4614-8768.      Weight Loss:    Weight loss is a long-term strategy that may improve sleep apnea in some patients.    Weight management is a personal decision and the decision should be based on your interest and the potential benefits.  If you are interested in exploring weight loss strategies, the following discussion covers the impact on weight loss on sleep apnea and the approaches that may be successful.    Being overweight does not necessarily mean you will have health consequences.  Those who have BMI over 35 or over 27 with existing medical conditions carries greater risk.   Weight loss decreases severity of sleep apnea in most people with obesity. For those with mild obesity who have developed snoring with weight gain, even 15-30 pound weight loss can improve and occasionally eliminate sleep apnea.  Structured and life-long dietary and health habits are necessary to lose weight and keep healthier weight levels.     Though there may be significant health benefits from weight loss, long-term weight loss is very difficult to achieve- studies show success with dietary management in less than 10% of people. In addition, substantial weight loss may require years of dietary control and  may be difficult if patients have severe obesity. In these cases, surgical management may be considered.  Finally, older individuals who have tolerated obesity without health complications may be less likely to benefit from weight loss strategies.        Surgery:    Surgery for obstructive sleep apnea is considered generally only when other therapies fail to work. Surgery may be discussed with you if you are having a difficult time tolerating CPAP and or when there is an abnormal structure that requires surgical correction.  Nose and throat surgeries often enlarge the airway to prevent collapse.  Most of these surgeries create pain for 1-2 weeks and up to half of the most common surgeries are not effective throughout life.  You should carefully discuss the benefits and drawbacks to surgery with your sleep provider and surgeon to determine if it is the best solution for you.   More information  Surgery for TERRIE is directed at areas that are responsible for narrowing or complete obstruction of the airway during sleep.  There are a wide range of procedures available to enlarge and/or stabilize the airway to prevent blockage of breathing in the three major areas where it can occur: the palate, tongue, and nasal regions.  Successful surgical treatment depends on the accurate identification of the factors responsible for obstructive sleep apnea in each person.  A personalized approach is required because there is no single treatment that works well for everyone.  Because of anatomic variation, consultation with an examination by a sleep surgeon is a critical first step in determining what surgical options are best for each patient.  In some cases, examination during sedation may be recommended in order to guide the selection of procedures.  Patients will be counseled about risks and benefits as well as the typical recovery course after surgery. Surgery is typically not a cure for a person s TERRIE.  However, surgery will  often significantly improve one s TERRIE severity (termed  success rate ).  Even in the absence of a cure, surgery will decrease the cardiovascular risk associated with OSA7; improve overall quality of life8 (sleepiness, functionality, sleep quality, etc).  Palate Procedures:  Patients with TERRIE often have narrowing of their airway in the region of their tonsils and uvula.  The goals of palate procedures are to widen the airway in this region as well as to help the tissues resist collapse.  Modern palate procedure techniques focus on tissue conservation and soft tissue rearrangement, rather than tissue removal.  Often the uvula is preserved in this procedure. Residual sleep apnea is common in patient after pharyngoplasty with an average reduction in sleep apnea events of 33%2.    Tongue Procedures:  ExamWhile patients are awake, the muscles that surround the throat are active and keep this region open for breathing. These muscles relax during sleep, allowing the tongue and other structures to collapse and block breathing.  There are several different tongue procedures available.  Selection of a tongue base procedure depends on characteristics seen on physical exam.  Generally, procedures are aimed at removing bulky tissues in this area or preventing the back of the tongue from falling back during sleep.  Success rates for tongue surgery range from 50-62%3.  Hypoglossal Nerve Stimulation:  Hypoglossal nerve stimulation has recently received approval from the [place] Food and Drug Administration for the treatment of obstructive sleep apnea.  This is based on research showing that the system was safe and effective in treating sleep apnea6.  Results showed that the median AHI score decreased 68%, from 29.3 to 9.0. This therapy uses an implant system that senses breathing patterns and delivers mild stimulation to airway muscles, which keeps the airway open during sleep.  The system consists of three fully implanted  components: a small generator (similar in size to a pacemaker), a breathing sensor, and a stimulation lead.  Using a small handheld remote, a patient turns the therapy on before bed and off upon awakening.    Candidates for this device must be greater than 18 years of age, have moderate to severe TERRIE (AHI between 15-65), BMI less than 35, have tried CPAP/oral appliance for at least 8 weeks without success, and have appropriate upper airway anatomy (determined by a sleep endoscopy performed by Dr. Jordin Paul).  Hypoglossal Nerve Stimulation Pathway:    The sleep surgeon s office will work with the patient through the insurance prior-authorization process (including communications and appeals).    Nasal Procedures:  Nasal obstruction can interfere with nasal breathing during the day and night.  Studies have shown that relief of nasal obstruction can improve the ability of some patients to tolerate positive airway pressure therapy for obstructive sleep apnea1.  Treatment options include medications such as nasal saline, topical corticosteroid and antihistamine sprays, and oral medications such as antihistamines or decongestants. Non-surgical treatments can include external nasal dilators for selected patients. If these are not successful by themselves, surgery can improve the nasal airway either alone or in combination with these other options.  Combination Procedures:  Combination of surgical procedures and other treatments may be recommended, particularly if patients have more than one area of narrowing or persistent positional disease.  The success rate of combination surgery ranges from 66-80%2,3.    References  Clay PASTOR. The Role of the Nose in Snoring and Obstructive Sleep Apnoea: An Update.  Eur Arch Otorhinolaryngol. 2011; 268: 1365-73.   Nikki SM; Justin JA; Bob JR; Pallanch JF; Ryan BURGER; Karley BERRIOS; Elisha BLAIR. Surgical modifications of the upper airway for obstructive sleep apnea in adults: a  systematic review and meta-analysis. SLEEP 2010;33(10):2185-3214. Karli MONTANEZ. Hypopharyngeal surgery in obstructive sleep apnea: an evidence-based medicine review.  Arch Otolaryngol Head Neck Surg. 2006 Feb;132(2):206-13.  Guy YH1, Lady Y, Enmanuel DONNA. The efficacy of anatomically based multilevel surgery for obstructive sleep apnea. Otolaryngol Head Neck Surg. 2003 Oct;129(4):327-35.  Kezirian E, Goldberg A. Hypopharyngeal Surgery in Obstructive Sleep Apnea: An Evidence-Based Medicine Review. Arch Otolaryngol Head Neck Surg. 2006 Feb;132(2):206-13.  Kane WILSON et al. Upper-Airway Stimulation for Obstructive Sleep Apnea.  N Engl J Med. 2014 Jan 9;370(2):139-49.  Elena Y et al. Increased Incidence of Cardiovascular Disease in Middle-aged Men with Obstructive Sleep Apnea. Am J Respir Crit Care Med; 2002 166: 159-165  Lassiter EM et al. Studying Life Effects and Effectiveness of Palatopharyngoplasty (SLEEP) study: Subjective Outcomes of Isolated Uvulopalatopharyngoplasty. Otolaryngol Head Neck Surg. 2011; 144: 623-631.    WHAT IF I ONLY HAVE SNORING?    Mandibular advancement devices, lateral sleep positioning, long-term weight loss and treatment of nasal allergies have been shown to improve snoring.  Exercising tongue muscles with a game (https://apps.codesy.[employer]/us/eloy/soundly-reduce-snoring/ym9796888474) or stimulating the tongue during the day with a device (https://doi.org/10.3390/sar78908266) have improved snoring in some individuals.    Remember to Drive Safe... Drive Alive     Sleep health profoundly affects your health, mood, and your safety.  Thirty three percent of the population (one in three of us) is not getting enough sleep and many have a sleep disorder. Not getting enough sleep or having an untreated / undertreated sleep condition may make us sleepy without even knowing it. In fact, our driving could be dramatically impaired due to our sleep health. As your provider, here are some things I would like you to  know about driving:     Here are some warning signs for impairment and dangerous drowsy driving:              -Having been awake more than 16 hours               -Looking tired               -Eyelid drooping              -Head nodding (it could be too late at this point)              -Driving for more than 30 minutes     Some things you could do to make the driving safer if you are experiencing some drowsiness:              -Stop driving and rest              -Call for transportation              -Make sure your sleep disorder is adequately treated     Some things that have been shown NOT to work when experiencing drowsiness while driving:              -Turning on the radio              -Opening windows              -Eating any  distracting  /  entertaining  foods (e.g., sunflower seeds, candy, or any other)              -Talking on the phone      Your decision may not only impact your life, but also the life of others. Please, remember to drive safe for yourself and all of us.

## 2023-10-09 NOTE — PROGRESS NOTES
Outpatient Sleep Medicine Consultation:      Name: Gerry Palafox MRN# [id number]   Age: 48 year old YOB: 1975     Date of Consultation: October 9, 2023  Consultation is requested by: CHARO Shepard CNP  [address]  [place] [address] Pooja Rodriguez  Primary care provider: Pooja Rodriguez       Reason for Sleep Consult:     Gerry Palafox is sent by Pooja Rodriguez for a sleep consultation regarding previously diagnosed TERRIE.    Patient s Reason for visit  Gerry Palafox main reason for visit: To look into other TERRIE options  Patient states problem(s) started: Diagnosed 2014. Probably most of my life.  Gerry Palafox's goals for this visit: Find an alternative treatment           Assessment and Plan:     Summary Sleep Diagnoses:  (G47.33) TERRIE (obstructive sleep apnea) AHI 21.8  Comment: Gerry is a 48-year-old male who presents to the sleep clinic to re-establish care for previously diagnosed moderate TERRIE. He was diagnosed in 2014 and has been using CPAP since. He is tired of using CPAP and would like to discuss alternative options. He finds it cumbersome to travel with, and he is simply tired of using it every night. Despite not liking his CPAP, his compliance is excellent and his download looks great. His leak is acceptable, and his residual AHI is normal at 0.4 events/hr. He denies difficulty initiating or maintaining sleep. No restless legs or unusual nocturnal behaviors. We reviewed alternative treatment options for obstructive sleep apnea including mandibular advancement device, positional therapy, surgery, weight management, and hypoglossal nerve stimulator. Gerry is interested in trying a mandibular advancement device. He would also like an ENT referral for recurrent strep infections.  Plan: Continue auto-PAP 9-16 cm H2O for the time being. A prescription was written for new supplies. An order was placed for a sleep dental referral as well as an ENT referral. Gerry  has large tonsils, and he reports recurrent strep infections this year. Discussed how he could have some residual apnea with the mandibular advancement device especially while sleeping supine. Informed him a repeat home sleep test would show that and a sleep positioning device could be used in conjunction with the dental appliance if necessary.     Comorbid Diagnoses:  Mild persistent asthma     Summary Recommendations:  Orders Placed This Encounter   Procedures    Comprehensive DME    Sleep Dental Referral    Adult ENT  Referral     Summary Counseling:    Sleep Testing Reviewed  Obstructive Sleep Apnea Reviewed  Complications of Untreated Sleep Apnea Reviewed    Patient will follow up after his dental appliance is made for a repeat home sleep test.   LO Lopes.    Total time spent reviewing medical records, history and physical examination, review of previous testing and interpretation as well as documentation on this date: 35 minutes    CC: CHARO Shepard CNP          History of Present Illness:     Gerry presents to the sleep clinic to re-establish care for previously diagnosed moderate TERRIE. He is interested in alternative options to CPAP.    Past Sleep Evaluations: HST on 07/30/2014 at 220 lbs.- AHI 21.8 events/hr, supine AHI 43.5 events/hr, sleep associated hypoxemia was present. Baseline oxygen saturation was 91% and time spent below 89% was 52 minutes. Lowest O2 saturation was 67%.     Snoring: Sometimes he does snore with CPAP on.  Mask Leak: No  Type of Mask: Full face mask. He has tried the nasal pillows.  Dry Nose or Mouth: No  Condensation in hose or mask: No  Nasal/Skin irritation: No    ResMed S9 AutoSet   Auto-PAP 9-16 cm H2O- 09/09/2023-10/08/2023  The compliance data shows that the patient used the CPAP for 30/30 nights, 93% of nights for >4 hours.  The 95th% pressure is 12.0 cm.  The 95th% leak is 18.6 lpm.  The average nightly usage is 5 hours 26 minutes.  The average  AHI is 0.4 events/hr.     SLEEP-WAKE SCHEDULE:     Work/School Days: Patient goes to school/work: Yes   Usually gets into bed at Between 9 & 10  Takes patient about 5 minutes to fall asleep  Has trouble falling asleep Rarely nights per week  Wakes up in the middle of the night Once times.  Wakes up due to Snorting self awake;External stimuli (bed partner, pets, noise, etc);Use the bathroom  He has trouble falling back asleep Rarely times a week.   It usually takes 5 minutes to get back to sleep  Patient is usually up at 4:15  Uses alarm: Yes    Weekends/Non-work Days/All Other Days:  Usually gets into bed at 9-10:00   Takes patient about 5 minutes to fall asleep  Patient is usually up at 4:15  Uses alarm: Yes    Sleep Need  Patient gets 6 hours sleep on average   Patient thinks he needs about 6hours sleep    Gerry Palafox prefers to sleep in this position(s): Side   Patient states they do the following activities in bed: Read;Use phone, computer, or tablet    Naps  Patient takes a purposeful nap Couple times a week. times a week and naps are usually Half hour to an hour in duration  He feels better after a nap: Yes  He dozes off unintentionally 5 days per week  Patient has had a driving accident or near-miss due to sleepiness/drowsiness: No    SLEEP DISRUPTIONS:    Breathing/Snoring  Patient snores: Yes, occasionally with CPAP.   Other people complain about his snoring: Yes  Patient has been told he stops breathing in his sleep: Yes, without CPAP  He has issues with the following: Rare morning headaches. Denies nocturnal heartburn/reflux.     Movement:  Patient gets pain, discomfort, with an urge to move: No Denies restless legs.   It happens when he is resting: No  It happens more at night: No  Patient has been told he kicks his legs at night: No     Behaviors in Sleep:  Gerry Palafox has experienced the following behaviors while sleeping:    He has experienced sudden muscle weakness during the day: No  Pt denies  bruxism, sleep talking, sleep walking, and dream enactment behavior. Pt denies sleep paralysis, hypnagogue and cataplexy.    Is there anything else you would like your sleep provider to know:      CAFFEINE AND OTHER SUBSTANCES:    Patient consumes caffeinated beverages per day: 1  Last caffeine use is usually: 2-4:00  List of any prescribed or over the counter stimulants that patient takes: None  List of any prescribed or over the counter sleep medication patient takes: None   List of previous sleep medications that patient has tried: None  Patient drinks alcohol to help them sleep: Yes  Patient drinks alcohol near bedtime: Yes    Family History:  Patient has a family member been diagnosed with a sleep disorder: Yes  TERRIE -father and brothers     Social History: He lives at home with his wife and three boys.     SCALES:    EPWORTH SLEEPINESS SCALE         10/6/2023     4:45 PM    [place] Sleepiness Scale ( CHETNA Vasquez  [id number]<br>ESS - [place]/English - Final version - 21 Nov 07 - [facility] [place].)   Sitting and reading High chance of dozing   Watching TV Moderate chance of dozing   Sitting, inactive in a public place (e.g. a theatre or a meeting) Slight chance of dozing   As a passenger in a car for an hour without a break Moderate chance of dozing   Lying down to rest in the afternoon when circumstances permit High chance of dozing   Sitting and talking to someone Would never doze   Sitting quietly after a lunch without alcohol Slight chance of dozing   In a car, while stopped for a few minutes in traffic Would never doze   [place] Score (MC) 12   [place] Score (Sleep) 12         INSOMNIA SEVERITY INDEX (DONNY)          10/2/2023     9:49 AM   Insomnia Severity Index (DONNY)   Difficulty falling asleep 0   Difficulty staying asleep 0   Problems waking up too early 1   How SATISFIED/DISSATISFIED are you with your CURRENT sleep pattern? 3   How NOTICEABLE to others do you think your sleep problem is in terms of  "impairing the quality of your life? 2   How WORRIED/DISTRESSED are you about your current sleep problem? 3   To what extent do you consider your sleep problem to INTERFERE with your daily functioning (e.g. daytime fatigue, mood, ability to function at work/daily chores, concentration, memory, mood, etc.) CURRENTLY? 3   DONNY Total Score 12       Guidelines for Scoring/Interpretation:  Total score categories:  0-7 = No clinically significant insomnia   8-14 = Subthreshold insomnia   15-21 = Clinical insomnia (moderate severity)  22-28 = Clinical insomnia (severe)  Used via courtesy of www.Skyfiberealth.va.gov with permission from Pradeep Solis PhD., Joint venture between [facility] and [facility]      STOP BANG         10/9/2023     9:00 AM   STOP BANG Questionnaire (  2008, the American Society of Anesthesiologists, Inc. [employer]   B/P Clinic: 125/72         GAD7         No data to display                  CAGE-AID         No data to display                CAGE-AID reprinted with permission from the [place] Medical Journal, TYRON Silver. and JONATHAN Lew, \"Conjoint screening questionnaires for alcohol and drug abuse\" [place] Medical Journal 94: 135-140, 1995.      PATIENT HEALTH QUESTIONNAIRE-9 (PHQ - 9)         No data to display                Developed by Guadalupe Tirado, Tamiko Palmer, Brain Dowling and colleagues, with an educational jenni from [employer] No permission required to reproduce, translate, display or distribute.        Allergies:    Allergies   Allergen Reactions    Penicillin G      hives    Penicillins Hives     hives       Medications:    Current Outpatient Medications   Medication Sig Dispense Refill    albuterol (2.5 MG/3ML) 0.083% neb solution Take 1 vial (2.5 mg) by nebulization every 6 hours as needed for shortness of breath / dyspnea or wheezing 30 vial 0    albuterol (PROAIR HFA/PROVENTIL HFA/VENTOLIN HFA) 108 (90 Base) MCG/ACT inhaler Inhale 1-2 puffs into the lungs every 4 hours " as needed for shortness of breath / dyspnea or wheezing Needs follow up appointment before further refills 8.5 g 0    allopurinol (ZYLOPRIM) 100 MG tablet Take 1.5 tablets (150 mg) by mouth daily 135 tablet 3    IBUPROFEN PO Take 400 mg by mouth 2 times daily as needed for moderate pain      minoxidil (LONITEN) 2.5 MG tablet       naproxen (NAPROSYN) 500 MG tablet TAKE 1 TABLET (500 MG) BY MOUTH 2 TIMES DAILY AS NEEDED FOR MODERATE PAIN (RATED 4-6)         Problem List:  Patient Active Problem List    Diagnosis Date Noted    Right peroneal tendinosis 03/14/2023     Priority: Medium    Grade 1 ankle sprain 03/14/2023     Priority: Medium    Chronic seasonal allergic rhinitis, unspecified trigger 05/21/2018     Priority: Medium    Mild persistent asthma      Priority: Medium    Pain in joint, ankle and foot 09/18/2014     Priority: Medium    Other postprocedural status(V45.89) 09/18/2014     Priority: Medium    TERRIE (obstructive sleep apnea) AHI 21.8 07/30/2014     Priority: Medium     HST 7/30/2014 50 min below 88%      Hyperlipidemia LDL goal <160 07/24/2014     Priority: Medium        Past Medical/Surgical History:  Past Medical History:   Diagnosis Date    Acute idiopathic gout, unspecified site     Mild persistent asthma     Sleep apnea     CPAP--regularly     Past Surgical History:   Procedure Laterality Date    HERNIORRHAPHY UMBILICAL N/A 05/24/2018    Procedure: HERNIORRHAPHY UMBILICAL;  Open Mesh Repair Umbilical Hernia;  Surgeon: Torsten Morelos MD;  Location:  OR       Social History:  Social History     Socioeconomic History    Marital status:      Spouse name: Not on file    Number of children: Not on file    Years of education: Not on file    Highest education level: Not on file   Occupational History    Not on file   Tobacco Use    Smoking status: Never    Smokeless tobacco: Never   Vaping Use    Vaping Use: Never used   Substance and Sexual Activity    Alcohol use: Yes     Comment: 3-4  beers a couple times per week    Drug use: No    Sexual activity: Yes     Partners: Female     Comment: monagamous   Other Topics Concern    Parent/sibling w/ CABG, MI or angioplasty before 65F 55M? No   Social History Narrative    Not on file     Social Determinants of Health     Financial Resource Strain: Not on file   Food Insecurity: Not on file   Transportation Needs: Not on file   Physical Activity: Not on file   Stress: Not on file   Social Connections: Not on file   Interpersonal Safety: Not on file   Housing Stability: Not on file       Family History:  Family History   Problem Relation Age of Onset    Arthritis Mother     Cancer Mother         Cancer in the breast    Cancer Father         lung cancer; cigar smoker    Hyperlipidemia Father     Sleep Apnea Father     Diabetes Maternal Grandmother         Not sure if this is maternal or paternal    Cerebrovascular Disease Maternal Grandfather         Not sure if maternal or paternal    Lung Cancer Paternal Grandfather         smoker    Sleep Apnea Brother     Sleep Apnea Brother     No Known Problems Son     No Known Problems Son     No Known Problems Son     Colon Cancer No family hx of        Review of Systems:  A complete review of systems reviewed by me is negative with the exeption of what has been mentioned in the history of present illness.  In the last TWO WEEKS have you experienced any of the following symptoms?  Fevers: No  Night Sweats: No  Weight Gain: No  Pain at Night: No  Double Vision: No  Changes in Vision: No  Difficulty Breathing through Nose: No  Sore Throat in Morning: No  Dry Mouth in the Morning: No  Shortness of Breath Lying Flat: No  Shortness of Breath With Activity: Yes  Awakening with Shortness of Breath: No  Increased Cough: No  Heart Racing at Night: No  Swelling in Feet or Legs: Yes  Diarrhea at Night: No  Heartburn at Night: No  Urinating More than Once at Night: No  Losing Control of Urine at Night: No  Joint Pains at Night:  "Yes  Headaches in Morning: Yes  Weakness in Arms or Legs: No  Depressed Mood: No  Anxiety: Yes     Physical Examination:  Vitals: /72   Pulse 64   Ht 1.676 m (5' 6\")   SpO2 99%   BMI 34.86 kg/m    BMI= Body mass index is 34.86 kg/m .    GENERAL APPEARANCE: healthy, alert, no distress, and cooperative  EYES: Eyes grossly normal to inspection  HENT: oral mucous membranes moist and no tonsillar erythema or exudate but tonsils 3+ extending beyond pillars.  NECK: no asymmetry, masses, or scars  RESP: no increased work of breathing noted, no audible cough or wheeze  PSYCH: mentation appears normal and affect normal/bright  Mallampati Class: II.  Tonsillar Stage: 3 extending beyond pillars.         Data: All pertinent previous laboratory data reviewed     Recent Labs   Lab Test 07/18/23  1433 02/20/23  1654    136   POTASSIUM 3.8 4.1   CHLORIDE 100 101   CO2 22 25   ANIONGAP 15 10   GLC 71 114*   BUN 10.8 14.7   CR 0.72 0.65*   GIOVANNA 9.5 9.1       No results for input(s): WBC, RBC, HGB, HCT, MCV, MCH, MCHC, RDW, PLT in the last 00886 hours.    Recent Labs   Lab Test 07/18/23  1433   PROTTOTAL 8.0   ALBUMIN 4.7   BILITOTAL 0.8   ALKPHOS 62   AST 35   ALT 35       No results found for: TSH    No results found for: UAMP, UBARB, BENZODIAZEUR, UCANN, UCOC, OPIT, UPCP    No results found for: IRONSAT, [id number], [place]    No results found for: PH, PHARTERIAL, PO2, VL1QTFPNJLM, SAT, PCO2, HCO3, BASEEXCESS, ANGLE, BEB    @LABRCNTIPR(phv:4,pco2v:4,po2v:4,hco3v:4,sandro:4,o2per:4)@    Echocardiology: No results found for this or any previous visit (from the past 4320 hour(s)).    Chest x-ray: No results found for this or any previous visit from the past 365 days.      Chest CT: No results found for this or any previous visit from the past 365 days.      PFT: Most Recent B[facility]ze Pulmonary Function Testing    No results found for: 20001  No results found for: 20002  No results found for: 20003  No results found for: 20015  No " results found for: 20016  No results found for: 20027  No results found for: 20028  No results found for: 20029  No results found for: 20079  No results found for: 20080  No results found for: 20081  No results found for: 20335  No results found for: 20105  No results found for: 20053  No results found for: 20054  No results found for: 20055      CHARO Burns CNP 10/9/2023

## 2023-10-09 NOTE — TELEPHONE ENCOUNTER
Caller: Gerry Palafox   Reason for Reschedule/Cancellation (please be detailed, any staff messages or encounters to note?):     PER PT -- CHANGE DATE       Prior to reschedule please review:  Ordering Provider:Pooja Rodriguez APRN CNP    Sedation per order:MODERATE   Does patient have any ASC Exclusions, please identify?: Y - TERRIE       Notes on Cancelled Procedure:  Procedure:Lower Endoscopy [Colonoscopy]   Date: 11/302023  Location:[facility]; [address] [facility], [address] [place] [address]  Surgeon: TUAN FAIRBANKS         Rescheduled: YES   Procedure: Lower Endoscopy [Colonoscopy]  Date: 01/10/2023  Location:[facility]; [address] [facility], [address] [place] [address]  Surgeon: ENEDELIA   Sedation Level Scheduled  MODERATE          Reason for Sedation Level PER ORDER   Prep/Instructions updated and sent: CAD Crowd        Send In - basket message to Panc - Colby Pool if EUS procedure is canceled or rescheduled: [ N/A, YES or NO] N/A

## 2023-11-20 NOTE — PROGRESS NOTES
Otolaryngology Clinic  November 22, 2023    Chief Complaint:   Tonsils       History of Present Illness:   Gerry Palafox is a 48 year old male who has a history of recurrent tonsillitis and strep pharyngitis currently on CPAP overnight. He presents to our clinic to discuss his frequent tonsil infections.  Patient reports 4 strep infections this year. The last illness was in September of this year.  He has been using his CPAP for the last 7 years but reports still being tired and falling asleep throughout the day.  He has asthma with yearly allergy exacerbations.  He reports his voice tires out more easily than it did in the past.  He is an  and has been told by his singing  that he should not be gasping for air when he sings.  He denies any swallowing issues.  He endorses clearing his throat a lot and a history of reflux.  He is not on any reflux treatment currently and states that his reflux is well-controlled at the moment.  He has no history of smoking.      Past Medical History:  Past Medical History:   Diagnosis Date    Acute idiopathic gout, unspecified site     Mild persistent asthma     Sleep apnea     CPAP--regularly       Past Surgical History:  Past Surgical History:   Procedure Laterality Date    HERNIORRHAPHY UMBILICAL N/A 05/24/2018    Procedure: HERNIORRHAPHY UMBILICAL;  Open Mesh Repair Umbilical Hernia;  Surgeon: Torsten Morelos MD;  Location:  OR       Medications:  Current Outpatient Medications   Medication Sig Dispense Refill    albuterol (2.5 MG/3ML) 0.083% neb solution Take 1 vial (2.5 mg) by nebulization every 6 hours as needed for shortness of breath / dyspnea or wheezing 30 vial 0    albuterol (PROAIR HFA/PROVENTIL HFA/VENTOLIN HFA) 108 (90 Base) MCG/ACT inhaler Inhale 1-2 puffs into the lungs every 4 hours as needed for shortness of breath / dyspnea or wheezing Needs follow up appointment before further refills 8.5 g 0    allopurinol (ZYLOPRIM) 100 MG tablet  Take 1.5 tablets (150 mg) by mouth daily 135 tablet 3    IBUPROFEN PO Take 400 mg by mouth 2 times daily as needed for moderate pain      minoxidil (LONITEN) 2.5 MG tablet       naproxen (NAPROSYN) 500 MG tablet TAKE 1 TABLET (500 MG) BY MOUTH 2 TIMES DAILY AS NEEDED FOR MODERATE PAIN (RATED 4-6)         Allergies:  Allergies   Allergen Reactions    Penicillin G      hives    Penicillins Hives     hives        Social History:  Social History     Tobacco Use    Smoking status: Never    Smokeless tobacco: Never   Vaping Use    Vaping Use: Never used   Substance Use Topics    Alcohol use: Yes     Comment: 3-4 beers a couple times per week    Drug use: No       ROS: 10 point ROS neg other than the symptoms noted above in the HPI.    Physical Exam:    There were no vitals taken for this visit.     Constitutional:  The patient was unaccompanied, well-groomed, and in no acute distress.     Skin: Normal:  warm and pink without rash    Neurologic: Alert and oriented x 3.  Voice normal, slight nasal quality.    Psychiatric: The patient's affect was calm, cooperative, and appropriate.   Communication:  Normal; communicates verbally, normal voice quality.   Respiratory: Breathing comfortably without stridor or exertion of accessory muscles. Breathing is noisy, but sounds like obstruction is in oropharynx vs lower   Head/Face:  Normocephalic and atraumatic.  No lesions or scars.    Eyes: Clear sclera. Extraocular movement intact.   Ears: Hearing normal to conversational voice, no external deformity   Nose: No external deformity, no anterior drainage   Oral Cavity: Normal tongue, floor of mouth, buccal mucosa, and palate.  No lesions or masses on inspection    Oropharynx: Normal mucosa, palate symmetric with normal elevation. Tonsils significantly enlarged, 3+ from anterior view   Hypopharynx: Absence of pooled secretions and normal  pyriform sinus and pharyngeal wall mucosa.   Larynx: Scope exam shows sharp epiglottis, false  vocal cords, true vocal cords.  Mobile arytenoids and cords.  Cords are clear and mobile. Lingual tonsils are enlarged and abut the epiglottis.   Neck: Supple with normal laryngeal and tracheal landmarks. Normal range of motion.        FIBEROPTIC LARYNGOSCOPY:  Due to noisy breathing, fiberoptic laryngoscopy was indicated. After obtaining verbal consent, the nose was topically decongested and anesthetized. The fiberoptic laryngoscope was passed under endoscopic vision through the right nasal passage. The turbinates were normal. The inferior and middle meati were clear bilaterally without purulence, masses, or polyps. The nasopharynx was clear. The eustachian tubes were clear. The soft palate not clearly visualized due to significant obstruction of oropharynx by bilateral palatine tonsils. Scattered tonsilliths on the left tonsil posteriorly. The epiglottis was sharp, and the visualized portion of the vallecula was clear. Lingual tonsils enlarged and abutting the epiglottis. The larynx was clear with mobile cords. The arytenoids were clear, and there was no pooling in the hypopharynx.      Assessment and Plan:  Tonsillar hypertrophy  Recurrent strep infections  Patient's tonsils are significantly enlarged on exam and causing some noisy upper airway breathing. Discussed tonsillectomy briefly and recommend a surgical consult. Will refer to Dr. Mendiola for tonsillectomy. A CT has also been ordered due to significant obstruction from tonsils.     Patient will follow up with surgeon for surgical consult    Bharti Godwin PA-C  Otolaryngology  Head & Neck Surgery  [phone]    Review of external notes as documented elsewhere in note  20 minutes spent by me on the date of the encounter doing chart review, history and exam, documentation and further activities per the note    Documented time does not include time spent on above procedure.

## 2023-11-22 ENCOUNTER — PRE VISIT (OUTPATIENT)
Dept: OTOLARYNGOLOGY | Facility: CLINIC | Age: 48
End: 2023-11-22

## 2023-11-22 ENCOUNTER — TELEPHONE (OUTPATIENT)
Dept: OTOLARYNGOLOGY | Facility: CLINIC | Age: 48
End: 2023-11-22

## 2023-11-22 ENCOUNTER — OFFICE VISIT (OUTPATIENT)
Dept: OTOLARYNGOLOGY | Facility: CLINIC | Age: 48
End: 2023-11-22
Payer: COMMERCIAL

## 2023-11-22 VITALS
BODY MASS INDEX: 35.68 KG/M2 | HEART RATE: 78 BPM | SYSTOLIC BLOOD PRESSURE: 128 MMHG | DIASTOLIC BLOOD PRESSURE: 80 MMHG | OXYGEN SATURATION: 96 % | HEIGHT: 66 IN | WEIGHT: 222 LBS

## 2023-11-22 DIAGNOSIS — J03.01 RECURRENT STREPTOCOCCAL TONSILLITIS: ICD-10-CM

## 2023-11-22 DIAGNOSIS — J35.1 TONSILLAR HYPERTROPHY: Primary | ICD-10-CM

## 2023-11-22 PROCEDURE — 99203 OFFICE O/P NEW LOW 30 MIN: CPT | Mod: 25 | Performed by: PHYSICIAN ASSISTANT

## 2023-11-22 PROCEDURE — 31575 DIAGNOSTIC LARYNGOSCOPY: CPT | Performed by: PHYSICIAN ASSISTANT

## 2023-11-22 ASSESSMENT — PAIN SCALES - GENERAL: PAINLEVEL: NO PAIN (0)

## 2023-11-22 NOTE — LETTER
11/22/2023       RE: Gerry Palafox  [address] [place] [address]     Dear Colleague,    Thank you for referring your patient, Gerry Palafox, to the [facility] [place] at [facility]. Please see a copy of my visit note below.      Otolaryngology Clinic  November 22, 2023    Chief Complaint:   Tonsils       History of Present Illness:   Gerry Palafox is a 48 year old male who has a history of recurrent tonsillitis and strep pharyngitis currently on CPAP overnight. He presents to our clinic to discuss his frequent tonsil infections.  Patient reports 4 strep infections this year. The last illness was in September of this year.  He has been using his CPAP for the last 7 years but reports still being tired and falling asleep throughout the day.  He has asthma with yearly allergy exacerbations.  He reports his voice tires out more easily than it did in the past.  He is an  and has been told by his singing  that he should not be gasping for air when he sings.  He denies any swallowing issues.  He endorses clearing his throat a lot and a history of reflux.  He is not on any reflux treatment currently and states that his reflux is well-controlled at the moment.  He has no history of smoking.      Past Medical History:  Past Medical History:   Diagnosis Date    Acute idiopathic gout, unspecified site     Mild persistent asthma     Sleep apnea     CPAP--regularly       Past Surgical History:  Past Surgical History:   Procedure Laterality Date    HERNIORRHAPHY UMBILICAL N/A 05/24/2018    Procedure: HERNIORRHAPHY UMBILICAL;  Open Mesh Repair Umbilical Hernia;  Surgeon: Torsten Morelos MD;  Location:  OR       Medications:  Current Outpatient Medications   Medication Sig Dispense Refill    albuterol (2.5 MG/3ML) 0.083% neb solution Take 1 vial (2.5 mg) by nebulization every 6 hours as needed for shortness of  breath / dyspnea or wheezing 30 vial 0    albuterol (PROAIR HFA/PROVENTIL HFA/VENTOLIN HFA) 108 (90 Base) MCG/ACT inhaler Inhale 1-2 puffs into the lungs every 4 hours as needed for shortness of breath / dyspnea or wheezing Needs follow up appointment before further refills 8.5 g 0    allopurinol (ZYLOPRIM) 100 MG tablet Take 1.5 tablets (150 mg) by mouth daily 135 tablet 3    IBUPROFEN PO Take 400 mg by mouth 2 times daily as needed for moderate pain      minoxidil (LONITEN) 2.5 MG tablet       naproxen (NAPROSYN) 500 MG tablet TAKE 1 TABLET (500 MG) BY MOUTH 2 TIMES DAILY AS NEEDED FOR MODERATE PAIN (RATED 4-6)         Allergies:  Allergies   Allergen Reactions    Penicillin G      hives    Penicillins Hives     hives        Social History:  Social History     Tobacco Use    Smoking status: Never    Smokeless tobacco: Never   Vaping Use    Vaping Use: Never used   Substance Use Topics    Alcohol use: Yes     Comment: 3-4 beers a couple times per week    Drug use: No       ROS: 10 point ROS neg other than the symptoms noted above in the HPI.    Physical Exam:    There were no vitals taken for this visit.     Constitutional:  The patient was unaccompanied, well-groomed, and in no acute distress.     Skin: Normal:  warm and pink without rash    Neurologic: Alert and oriented x 3.  Voice normal, slight nasal quality.    Psychiatric: The patient's affect was calm, cooperative, and appropriate.   Communication:  Normal; communicates verbally, normal voice quality.   Respiratory: Breathing comfortably without stridor or exertion of accessory muscles. Breathing is noisy, but sounds like obstruction is in oropharynx vs lower   Head/Face:  Normocephalic and atraumatic.  No lesions or scars.    Eyes: Clear sclera. Extraocular movement intact.   Ears: Hearing normal to conversational voice, no external deformity   Nose: No external deformity, no anterior drainage   Oral Cavity: Normal tongue, floor of mouth, buccal mucosa,  and palate.  No lesions or masses on inspection    Oropharynx: Normal mucosa, palate symmetric with normal elevation. Tonsils significantly enlarged, 3+ from anterior view   Hypopharynx: Absence of pooled secretions and normal  pyriform sinus and pharyngeal wall mucosa.   Larynx: Scope exam shows sharp epiglottis, false vocal cords, true vocal cords.  Mobile arytenoids and cords.  Cords are clear and mobile. Lingual tonsils are enlarged and abut the epiglottis.   Neck: Supple with normal laryngeal and tracheal landmarks. Normal range of motion.        FIBEROPTIC LARYNGOSCOPY:  Due to noisy breathing, fiberoptic laryngoscopy was indicated. After obtaining verbal consent, the nose was topically decongested and anesthetized. The fiberoptic laryngoscope was passed under endoscopic vision through the right nasal passage. The turbinates were normal. The inferior and middle meati were clear bilaterally without purulence, masses, or polyps. The nasopharynx was clear. The eustachian tubes were clear. The soft palate not clearly visualized due to significant obstruction of oropharynx by bilateral palatine tonsils. Scattered tonsilliths on the left tonsil posteriorly. The epiglottis was sharp, and the visualized portion of the vallecula was clear. Lingual tonsils enlarged and abutting the epiglottis. The larynx was clear with mobile cords. The arytenoids were clear, and there was no pooling in the hypopharynx.      Assessment and Plan:  Tonsillar hypertrophy  Recurrent strep infections  Patient's tonsils are significantly enlarged on exam and causing some noisy upper airway breathing. Discussed tonsillectomy briefly and recommend a surgical consult. Will refer to Dr. Mendiola for tonsillectomy. A CT has also been ordered due to significant obstruction from tonsils.     Patient will follow up with surgeon for surgical consult    Bharti Godwin PA-C  Otolaryngology  Head & Neck Surgery  [phone]    Review of external notes  as documented elsewhere in note  20 minutes spent by me on the date of the encounter doing chart review, history and exam, documentation and further activities per the note    Documented time does not include time spent on above procedure.       Again, thank you for allowing me to participate in the care of your patient.      Sincerely,    Bharti Godwin PA-C

## 2023-11-22 NOTE — PATIENT INSTRUCTIONS
1. You were seen in the ENT Clinic today by PAOLA Mosher.  If you have any questions or concerns after your appointment, please call   - Option 1: ENT Clinic: [phone]   - Option 2: Quin (Bharti Godwin's  Nurse): [phone]                     2.   Plan to return to clinic for surgical consult    Quin Jones LPN  [facility] - Otolaryngology

## 2023-11-22 NOTE — TELEPHONE ENCOUNTER
SAGE to schedule Surgical consult for tonsillectomy - Referred by Bharti  with Dr. Mendiola in the next month per checkout    Gave direct number as I am the only one able to schedule

## 2023-12-28 ENCOUNTER — TELEPHONE (OUTPATIENT)
Dept: GASTROENTEROLOGY | Facility: CLINIC | Age: 48
End: 2023-12-28
Payer: COMMERCIAL

## 2023-12-28 NOTE — TELEPHONE ENCOUNTER
Pre assessment completed for upcoming procedure.      Procedure details:    Patient scheduled for Colonoscopy  on 1.10.24.     Arrival time: 1015. Procedure time 1115    Pre op exam needed? N/A    Facility location: [facility]; [address], [address], [place] [address]    Sedation type: Conscious sedation     Indication for procedure: screening    COVID policy reviewed.    Designated  policy reviewed. Instructed to have someone stay 6 hours post procedure.       Chart review:     Electronic implanted devices? No    Recent diagnosis of diverticulitis within the last 6 weeks?  No    Diabetic? No      Medication review:    Anticoagulants? No    NSAIDS? Yes.  Naproxen (Naprosyn, Aleve).  Holding interval of 4 days.    Other medication HOLDING recommendations:  N/A      Prep for procedure:     Bowel prep recommendation: Standard Miralax  Due to: standard bowel prep.    Prep instructions sent via [employer]     Reviewed procedure prep instructions.     Patient verbalized understanding and had no questions or concerns at this time.        Elizabeth Denton RN  Endoscopy Procedure Pre Assessment RN  [phone] option 4

## 2024-01-04 ENCOUNTER — TELEPHONE (OUTPATIENT)
Dept: GASTROENTEROLOGY | Facility: CLINIC | Age: 49
End: 2024-01-04
Payer: COMMERCIAL

## 2024-01-04 NOTE — TELEPHONE ENCOUNTER
Caller: Gerry Palafox    Reason for Reschedule/Cancellation (please be detailed, any staff messages or encounters to note?): schedule conflict      Prior to reschedule please review:  Ordering Provider: KHUSHBOO NORRIS  Sedation per order: CS  Does patient have any ASC Exclusions, please identify?: TERRIE      Notes on Cancelled Procedure:  Procedure: Lower Endoscopy [Colonoscopy]   Date: 1/10/24  Location: [facility]; [address], [place] [address]  Surgeon: ENEDELIA      Rescheduled: No    THE PATIENT WILL CALL BACK AT A LATER DATE TO RESCHEDULE.     Send In - basket message to Panc - Colby Pool if EUS  procedure is canceled or rescheduled: [ N/A, YES or NO] N/A

## 2024-01-16 ENCOUNTER — PRE VISIT (OUTPATIENT)
Dept: OTOLARYNGOLOGY | Facility: CLINIC | Age: 49
End: 2024-01-16

## 2024-01-16 ENCOUNTER — OFFICE VISIT (OUTPATIENT)
Dept: OTOLARYNGOLOGY | Facility: CLINIC | Age: 49
End: 2024-01-16
Payer: COMMERCIAL

## 2024-01-16 VITALS
DIASTOLIC BLOOD PRESSURE: 84 MMHG | OXYGEN SATURATION: 98 % | BODY MASS INDEX: 37.7 KG/M2 | HEART RATE: 98 BPM | WEIGHT: 234.6 LBS | HEIGHT: 66 IN | SYSTOLIC BLOOD PRESSURE: 148 MMHG

## 2024-01-16 DIAGNOSIS — E66.01 CLASS 2 SEVERE OBESITY DUE TO EXCESS CALORIES WITH SERIOUS COMORBIDITY AND BODY MASS INDEX (BMI) OF 35.0 TO 35.9 IN ADULT (H): ICD-10-CM

## 2024-01-16 DIAGNOSIS — E66.812 CLASS 2 SEVERE OBESITY DUE TO EXCESS CALORIES WITH SERIOUS COMORBIDITY AND BODY MASS INDEX (BMI) OF 35.0 TO 35.9 IN ADULT (H): ICD-10-CM

## 2024-01-16 DIAGNOSIS — J03.01 RECURRENT STREPTOCOCCAL TONSILLITIS: Primary | ICD-10-CM

## 2024-01-16 PROCEDURE — 99214 OFFICE O/P EST MOD 30 MIN: CPT | Performed by: OTOLARYNGOLOGY

## 2024-01-16 ASSESSMENT — PAIN SCALES - GENERAL: PAINLEVEL: NO PAIN (0)

## 2024-01-16 NOTE — LETTER
2024       RE: Gerry Palafox  [address] [place] [address]     Dear Colleague,    Thank you for referring your patient, Gerry Palafox, to the [facility] EAR NOSE AND THROAT CLINIC [place] at [facility]. Please see a copy of my visit note below.      Otolaryngology Clinic      Name: Gerry Palafox  MRN: [id number]  Age: 48 year old  : 1975  Referring provider: Referred Self  2024      Chief Complaint:  Consultation    History of Present Illness:   Gerry Palafox is a 48 year old male with a history of Sleep Apnea who presents for consultation regarding possible tonsillectomy. He reports having large tonsils and chronic sore throat. His symptoms have been worse since his kids have contracted Strep throat in school.      Active Medications:     Current Outpatient Medications:     albuterol (2.5 MG/3ML) 0.083% neb solution, Take 1 vial (2.5 mg) by nebulization every 6 hours as needed for shortness of breath / dyspnea or wheezing, Disp: 30 vial, Rfl: 0    albuterol (PROAIR HFA/PROVENTIL HFA/VENTOLIN HFA) 108 (90 Base) MCG/ACT inhaler, Inhale 1-2 puffs into the lungs every 4 hours as needed for shortness of breath / dyspnea or wheezing Needs follow up appointment before further refills, Disp: 8.5 g, Rfl: 0    allopurinol (ZYLOPRIM) 100 MG tablet, Take 1.5 tablets (150 mg) by mouth daily, Disp: 135 tablet, Rfl: 3    IBUPROFEN PO, Take 400 mg by mouth 2 times daily as needed for moderate pain, Disp: , Rfl:     minoxidil (LONITEN) 2.5 MG tablet, , Disp: , Rfl:     naproxen (NAPROSYN) 500 MG tablet, TAKE 1 TABLET (500 MG) BY MOUTH 2 TIMES DAILY AS NEEDED FOR MODERATE PAIN (RATED 4-6), Disp: , Rfl:     Current Facility-Administered Medications:     lidocaine (PF) (XYLOCAINE) 1 % injection 1 mL, 1 mL, , , Tiago Ray MD, 1 mL at 20 1136    lidocaine 1 % injection 3 mL, 3 mL, , , Oscar Barry DO, 3 mL at 18 1006     lidocaine 1 % injection 4 mL, 4 mL, , , Oscar Barry A, DO, 4 mL at 12/29/18 1504    triamcinolone (KENALOG-40) injection 40 mg, 40 mg, , , Oscar Barry, DO, 40 mg at 05/16/23 1433    triamcinolone (KENALOG-40) injection 40 mg, 40 mg, , , Tiago Ray MD, 40 mg at 06/25/20 1136    triamcinolone (KENALOG-40) injection 40 mg, 40 mg, , , Asha, Lico, DO, 40 mg at 12/29/18 1504      Allergies:   Penicillin g and Penicillins      Past Medical History:  Past Medical History:   Diagnosis Date    Acute idiopathic gout, unspecified site     Mild persistent asthma     Sleep apnea     CPAP--regularly     Patient Active Problem List   Diagnosis    Hyperlipidemia LDL goal <160    TERRIE (obstructive sleep apnea) AHI 21.8    Pain in joint, ankle and foot    Other postprocedural status(V45.89)    Mild persistent asthma    Chronic seasonal allergic rhinitis, unspecified trigger    Right peroneal tendinosis    Grade 1 ankle sprain        Past Surgical History:  Past Surgical History:   Procedure Laterality Date    HERNIORRHAPHY UMBILICAL N/A 05/24/2018    Procedure: HERNIORRHAPHY UMBILICAL;  Open Mesh Repair Umbilical Hernia;  Surgeon: Torsten Morelos MD;  Location:  OR       Family History:   Family History   Problem Relation Age of Onset    Arthritis Mother     Cancer Mother         Cancer in the breast    Cancer Father         lung cancer; cigar smoker    Hyperlipidemia Father     Sleep Apnea Father     Diabetes Maternal Grandmother         Not sure if this is maternal or paternal    Cerebrovascular Disease Maternal Grandfather         Not sure if maternal or paternal    Lung Cancer Paternal Grandfather         smoker    Sleep Apnea Brother     Sleep Apnea Brother     No Known Problems Son     No Known Problems Son     No Known Problems Son     Colon Cancer No family hx of          Social History:   Social History     Tobacco Use    Smoking status: Never    Smokeless tobacco: Never   Vaping Use    Vaping  "Use: Never used   Substance Use Topics    Alcohol use: Yes     Comment: 3-4 beers a couple times per week    Drug use: No       Review of Systems:   Pertinent items are noted in HPI or as in patient entered ROS below, remainder of complete ROS is negative.       1/14/2024     5:16 PM    ENT ROS   Gastrointestinal/Genitourinary Diarrhea         Physical Exam:   BP (!) 148/84   Pulse 98   Ht 1.676 m (5' 6\")   Wt 106.4 kg (234 lb 9.6 oz)   SpO2 98%   BMI 37.87 kg/m       Constitutional:  The patient was unaccompanied, well-groomed, and in no acute distress.    Skin:  Warm and pink.    Neurologic:  Alert and oriented x 3.  CN's III-XII within normal limits.  Voice normal.   Psychiatric:  The patient's affect was calm, cooperative, and appropriate.    Respiratory:  Breathing comfortably without stridor or exertion of accessory muscles.    Eyes: Extraocular movement intact.    Head:  Normocephalic and atraumatic.  No lesions or scars.    Ears:  Pinnae and tragus non-tender.  EAC's and TM's were clear.   Nose:  Sinuses were non-tender.  Anterior rhinoscopy revealed midline septum and absence of purulence or polyps.    OC/OP:  Normal tongue, floor of mouth, buccal mucosa, and palate.  No lesions or masses on inspection or palpation.  No abnormal lymph tissue in the oropharynx.  The pterygoid region is non-tender. Tonsils are 3+ to 4+ on both sides.   Neck:  Supple with normal laryngeal and tracheal landmarks.  The parotid beds were without masses.  No palpable thyroid.  Lymphatic:  There is no palpable lymphadenopathy in the neck.     Assessment and Plan:  Gerry Palafox is a 48 year old male with a history of Sleep Apnea who presents for consultation regarding possible tonsillectomy. On exam, bilateral tonsils are 3+ to 4+. We have him scheduled for a CAT scan. The patient really should probably have a bilateral tonsillectomy. But with the size of his tonsils, I'm thinking that so much of his pharyngeal mucosa may be " gone if he had a bilateral procedure. I believe he should have a stage tonsillectomy where one side is taken out and then a month or two later the other side is taken out. I have put him in for a consult with my colleague, Dr. Beavers, to see whether he should have a bilateral procedure at once , or if he should have a staged tonsillectomy like I have recommended above. I realizre this is a somewhat unuisual question to Dr Beavers.      Follow-up: No follow-ups on file.         Scribe Disclosure:   I, Rafiq Phillips, am serving as a scribe; to document services personally performed by Ramírez Mendiola MD -based on data collection and the provider's statements to me.     Provider Disclosure:  I agree with above History, Review of Systems, Physical exam and Plan.  I have reviewed the content of the documentation and have edited it as needed. I have personally performed the services documented here and the documentation accurately represents those services and the decisions I have made.      Electronically signed by:  Ramírez Mendiola MD

## 2024-01-16 NOTE — PROGRESS NOTES
Otolaryngology Clinic      Name: Gerry Palafox  MRN: [id number]  Age: 48 year old  : 1975  Referring provider: Referred Self  2024      Chief Complaint:  Consultation    History of Present Illness:   Gerry Palafox is a 48 year old male with a history of Sleep Apnea who presents for consultation regarding possible tonsillectomy. He reports having large tonsils and chronic sore throat. His symptoms have been worse since his kids have contracted Strep throat in school.      Active Medications:     Current Outpatient Medications:     albuterol (2.5 MG/3ML) 0.083% neb solution, Take 1 vial (2.5 mg) by nebulization every 6 hours as needed for shortness of breath / dyspnea or wheezing, Disp: 30 vial, Rfl: 0    albuterol (PROAIR HFA/PROVENTIL HFA/VENTOLIN HFA) 108 (90 Base) MCG/ACT inhaler, Inhale 1-2 puffs into the lungs every 4 hours as needed for shortness of breath / dyspnea or wheezing Needs follow up appointment before further refills, Disp: 8.5 g, Rfl: 0    allopurinol (ZYLOPRIM) 100 MG tablet, Take 1.5 tablets (150 mg) by mouth daily, Disp: 135 tablet, Rfl: 3    IBUPROFEN PO, Take 400 mg by mouth 2 times daily as needed for moderate pain, Disp: , Rfl:     minoxidil (LONITEN) 2.5 MG tablet, , Disp: , Rfl:     naproxen (NAPROSYN) 500 MG tablet, TAKE 1 TABLET (500 MG) BY MOUTH 2 TIMES DAILY AS NEEDED FOR MODERATE PAIN (RATED 4-6), Disp: , Rfl:     Current Facility-Administered Medications:     lidocaine (PF) (XYLOCAINE) 1 % injection 1 mL, 1 mL, , , Tiago Ray MD, 1 mL at 20 1136    lidocaine 1 % injection 3 mL, 3 mL, , , Oscar Barry DO, 3 mL at 18 1004    lidocaine 1 % injection 4 mL, 4 mL, , , Oscar Barry DO, 4 mL at 18 1504    triamcinolone (KENALOG-40) injection 40 mg, 40 mg, , , Oscar Barry DO, 40 mg at 23 1433    triamcinolone (KENALOG-40) injection 40 mg, 40 mg, , , Tiago Ray MD, 40 mg at 20 1136    triamcinolone (KENALOG-40)  injection 40 mg, 40 mg, , , Oscar Barry DO, 40 mg at 12/29/18 1504      Allergies:   Penicillin g and Penicillins      Past Medical History:  Past Medical History:   Diagnosis Date    Acute idiopathic gout, unspecified site     Mild persistent asthma     Sleep apnea     CPAP--regularly     Patient Active Problem List   Diagnosis    Hyperlipidemia LDL goal <160    TERRIE (obstructive sleep apnea) AHI 21.8    Pain in joint, ankle and foot    Other postprocedural status(V45.89)    Mild persistent asthma    Chronic seasonal allergic rhinitis, unspecified trigger    Right peroneal tendinosis    Grade 1 ankle sprain        Past Surgical History:  Past Surgical History:   Procedure Laterality Date    HERNIORRHAPHY UMBILICAL N/A 05/24/2018    Procedure: HERNIORRHAPHY UMBILICAL;  Open Mesh Repair Umbilical Hernia;  Surgeon: Torsten Morelos MD;  Location:  OR       Family History:   Family History   Problem Relation Age of Onset    Arthritis Mother     Cancer Mother         Cancer in the breast    Cancer Father         lung cancer; cigar smoker    Hyperlipidemia Father     Sleep Apnea Father     Diabetes Maternal Grandmother         Not sure if this is maternal or paternal    Cerebrovascular Disease Maternal Grandfather         Not sure if maternal or paternal    Lung Cancer Paternal Grandfather         smoker    Sleep Apnea Brother     Sleep Apnea Brother     No Known Problems Son     No Known Problems Son     No Known Problems Son     Colon Cancer No family hx of          Social History:   Social History     Tobacco Use    Smoking status: Never    Smokeless tobacco: Never   Vaping Use    Vaping Use: Never used   Substance Use Topics    Alcohol use: Yes     Comment: 3-4 beers a couple times per week    Drug use: No       Review of Systems:   Pertinent items are noted in HPI or as in patient entered ROS below, remainder of complete ROS is negative.       1/14/2024     5:16 PM   [facility] ENT ROS  "  Gastrointestinal/Genitourinary Diarrhea         Physical Exam:   BP (!) 148/84   Pulse 98   Ht 1.676 m (5' 6\")   Wt 106.4 kg (234 lb 9.6 oz)   SpO2 98%   BMI 37.87 kg/m       Constitutional:  The patient was unaccompanied, well-groomed, and in no acute distress.    Skin:  Warm and pink.    Neurologic:  Alert and oriented x 3.  CN's III-XII within normal limits.  Voice normal.   Psychiatric:  The patient's affect was calm, cooperative, and appropriate.    Respiratory:  Breathing comfortably without stridor or exertion of accessory muscles.    Eyes: Extraocular movement intact.    Head:  Normocephalic and atraumatic.  No lesions or scars.    Ears:  Pinnae and tragus non-tender.  EAC's and TM's were clear.   Nose:  Sinuses were non-tender.  Anterior rhinoscopy revealed midline septum and absence of purulence or polyps.    OC/OP:  Normal tongue, floor of mouth, buccal mucosa, and palate.  No lesions or masses on inspection or palpation.  No abnormal lymph tissue in the oropharynx.  The pterygoid region is non-tender. Tonsils are 3+ to 4+ on both sides.   Neck:  Supple with normal laryngeal and tracheal landmarks.  The parotid beds were without masses.  No palpable thyroid.  Lymphatic:  There is no palpable lymphadenopathy in the neck.     Assessment and Plan:  Gerry Palafox is a 48 year old male with a history of Sleep Apnea who presents for consultation regarding possible tonsillectomy. On exam, bilateral tonsils are 3+ to 4+. We have him scheduled for a CAT scan. The patient really should probably have a bilateral tonsillectomy. But with the size of his tonsils, I'm thinking that so much of his pharyngeal mucosa may be gone if he had a bilateral procedure. I believe he should have a stage tonsillectomy where one side is taken out and then a month or two later the other side is taken out. I have put him in for a consult with my colleague, Dr. Beavers, to see whether he should have a bilateral procedure at once , " or if he should have a staged tonsillectomy like I have recommended above. I realizre this is a somewhat unuisual question to Dr Beavers.      Follow-up: No follow-ups on file.         Scribe Disclosure:   I, Rafiq Phillips, am serving as a scribe; to document services personally performed by Ramírez Mendiola MD -based on data collection and the provider's statements to me.     Provider Disclosure:  I agree with above History, Review of Systems, Physical exam and Plan.  I have reviewed the content of the documentation and have edited it as needed. I have personally performed the services documented here and the documentation accurately represents those services and the decisions I have made.      Electronically signed by:  Ramírez Mendiola MD

## 2024-01-16 NOTE — TELEPHONE ENCOUNTER
FUTURE VISIT INFORMATION      FUTURE VISIT INFORMATION:  Date: 1/22/24  Time: 3:30pm  Location: [facility]  REFERRAL INFORMATION:  Referring provider:  Dr. Mendiola  Referring providers clinic:  [facility] ent   Reason for visit/diagnosis  second opinion for tonsilectomy     RECORDS REQUESTED FROM:       Clinic name Comments Records Status Imaging Status   [facility] ENT 1/16/24- ov Dr. Mendiola  11/22/23- OV Bharti Godwin PA-C  [facility] 10/9/23- OV Eufemia Salas APRN [facility]      [facility]  5/6/23- OV Milad Roe MD    1/10/23- OV Margo Garsia MD  Care everywhere       imaging  1/22/24- ct neck - schedule  Epic  [facility]

## 2024-01-22 ENCOUNTER — OFFICE VISIT (OUTPATIENT)
Dept: OTOLARYNGOLOGY | Facility: CLINIC | Age: 49
End: 2024-01-22
Payer: COMMERCIAL

## 2024-01-22 ENCOUNTER — ANCILLARY PROCEDURE (OUTPATIENT)
Dept: CT IMAGING | Facility: CLINIC | Age: 49
End: 2024-01-22
Attending: PHYSICIAN ASSISTANT
Payer: COMMERCIAL

## 2024-01-22 ENCOUNTER — PREP FOR PROCEDURE (OUTPATIENT)
Dept: OTOLARYNGOLOGY | Facility: CLINIC | Age: 49
End: 2024-01-22

## 2024-01-22 ENCOUNTER — PRE VISIT (OUTPATIENT)
Dept: OTOLARYNGOLOGY | Facility: CLINIC | Age: 49
End: 2024-01-22

## 2024-01-22 VITALS
HEIGHT: 67 IN | SYSTOLIC BLOOD PRESSURE: 127 MMHG | WEIGHT: 233.4 LBS | DIASTOLIC BLOOD PRESSURE: 75 MMHG | OXYGEN SATURATION: 95 % | BODY MASS INDEX: 36.63 KG/M2 | HEART RATE: 93 BPM

## 2024-01-22 DIAGNOSIS — J35.1 TONSILLAR HYPERTROPHY: Primary | ICD-10-CM

## 2024-01-22 DIAGNOSIS — J35.1 TONSILLAR HYPERTROPHY: ICD-10-CM

## 2024-01-22 PROCEDURE — 70491 CT SOFT TISSUE NECK W/DYE: CPT | Mod: GC | Performed by: RADIOLOGY

## 2024-01-22 PROCEDURE — 99213 OFFICE O/P EST LOW 20 MIN: CPT | Performed by: OTOLARYNGOLOGY

## 2024-01-22 RX ORDER — IOPAMIDOL 755 MG/ML
90 INJECTION, SOLUTION INTRAVASCULAR ONCE
Status: COMPLETED | OUTPATIENT
Start: 2024-01-22 | End: 2024-01-22

## 2024-01-22 RX ORDER — DEXAMETHASONE SODIUM PHOSPHATE 4 MG/ML
10 INJECTION, SOLUTION INTRA-ARTICULAR; INTRALESIONAL; INTRAMUSCULAR; INTRAVENOUS; SOFT TISSUE ONCE
Status: CANCELLED | OUTPATIENT
Start: 2024-01-22 | End: 2024-01-22

## 2024-01-22 RX ADMIN — IOPAMIDOL 90 ML: 755 INJECTION, SOLUTION INTRAVASCULAR at 16:49

## 2024-01-22 ASSESSMENT — PAIN SCALES - GENERAL: PAINLEVEL: NO PAIN (0)

## 2024-01-22 NOTE — NURSING NOTE
"Chief Complaint   Patient presents with    Consult   Blood pressure 127/75, pulse 93, height 1.702 m (5' 7\"), weight 105.9 kg (233 lb 6.4 oz), SpO2 95%. Holden Rubin, EMT    "

## 2024-01-22 NOTE — PROGRESS NOTES
HISTORY OF PRESENT ILLNESS:   Gerry Palafox is a 48 year old year old male who presents today for a second opinion on tonsillectomy. Patient was seen by Dr. Mendiola about a week ago for tonsillar hypertrophy. He has been dealing with enlarged tonsils that are very disruptive for him.  He gets recurrent infections.  They become so large that they interfere with his swallowing.  He has obstructive sleep apnea.    PHYSICAL EXAMINATION:  In no acute distress. Normal mood, normal affect, alert, and appropriate. Head is normocephalic. Cranial nerve VII is House-Backmann I out of VI bilaterally. Breathing without difficulty or stridor. Eyes are anicteric.     Examination of the oropharynx shows 3+ cryptic tonsils.    ASSESSMENT/PLAN:   Gerry Palafox is a 48 year old year old male who presents today for a secondary opinion for a possible tonsillectomy.  At this point I have recommended bilateral tonsillectomy.  I feel comfortable doing this at the same time.  I discussed the risks and benefits with the patient and he wishes to proceed.        Scribe Disclosure:   I, Rafiq Phillips, am serving as a scribe; to document services personally performed by Peter Beavers MD -based on data collection and the provider's statements to me.     Provider Disclosure:  I agree with above History, Review of Systems, Physical exam and Plan.  I have reviewed the content of the documentation and have edited it as needed. I have personally performed the services documented here and the documentation accurately represents those services and the decisions I have made.      Electronically signed by:  Peter Beavers MD

## 2024-01-22 NOTE — PATIENT INSTRUCTIONS
You were seen in the ENT Clinic today by Dr. Beavers. If you have any questions or concerns after your appointment, please contact us (see below)      2.   Please return to clinic for your post op appointments.     What to expect after surgery:   1. A low fever (below 101 degrees Fahrenheit or 38.3 degrees Celsius, taken under the tongue).   2. A sore throat that last 7-10 days, or as long as 14 days.   3. Ear, jaw or neck pain. Pain may peak about a week after surgery.   4. Yellow or white-gray tissue where the tonsils were removed.   5. Bad breath for many days as the throat heals. Gentle tooth brushing is allowed. Do not have gargle.   6. A change in the voice. This will go away in about 3 weeks.   7. Snoring: This will usually improve over time.   8. Stuffy nose: This is normal.    Care after surgery:   1. Consume a mechanical soft diet for the first week after surgery. Progress to thicker foods as tolerated. Things such as macaroni, eggs, mashed potatoes, applesauce, cooked cereal, yogurt, etc.   2. Avoid rough or crunchy foods for at least 7 days.   3. Consume plenty of fluids: at least 24-64 ounces per day. Cool or lukewarm liquids may feel better at first. Sports drinks are a good choice. Avoid citrus juice as this may burn.   4. Popsicles, smoothies, and ICEES are good options to soothe the throat. NO STRAWS.   5. Chewing gum may help increase saliva and ease pain.    Things to avoid:   1. Gargling   2. Avoid rough or crunchy foods for at least 7 days   3. Straws   4. Heavy or strenuous activity for at least 7 days.    Activity:   1. You should avoid heavy or strenuous activity for 7 days.   2. Refrain from work for at least 2 weeks following surgery. You should not return to work or drive if you are still taking prescribed narcotic pain medication.    Pain:   1. Pain may start to get better and then get worse again, often peaking on days 3-7 after surgery. THIS IS COMMON AND EXPECTED.   2. It will hurt to  swallow. The more you attempt to swallow, the less it will hurt.   3. You may take prescribed pain medicine as needed. We will tell you how much to take and how often. Expect to take pain medications for at least 7-10 days.   4. After 2 days, you may replace some or all of the prescribe medicine with liquid Tylenol.   5. Talk to your doctor before giving ibuprofen (Motrin, Advil) or other potential blood thinning medications within 10 days following surgery. Some medicines will increase the risk of bleeding.   6. A humidifier may help ease a sore throat. You might also try an ice pack on the throat for 20 minutes. (Place a cloth between the skin and the ice pack).    When to call us:   1. Bleeding: If you have any bleeding, call your clinic right away.    A. Some bleeding after surgery is normal. Expect blood-tinged mucous.  B. The risk for bleeding increases approximately 10 days after surgery when the site eschar-gray patches where tonsils were removed (like a wet scab) begins to fall off.   C. If bleeding happens after clinic hours, go to the emergency room. Bleeding may occur up to 2 weeks after surgery. Most people will spit out the blood. Some people may swallow the blood and then vomit.  2. Fever of over 101 degrees Fahrenheit or 38.3 degrees Celsius taken under the tongue if the fever lasts more than 48 hours.  3. Nausea, vomiting or constipation. Narcotic medication can cause constipation so increased fluids and a stool softener may help in preventing this.  4. Breathing problems, more than just a stuffy nose, go to the emergency room.    Follow up:   1. Expect to follow up in clinic 3 weeks after surgery.     Surgery Teaching Surgery Teaching      1.You must have a physical exam (called  history and physical ) within 30 days of surgery. You can do this at the PAC clinic or your family clinic. Bring the Pre-Op Surgery Form (found in the surgery packet that you received in the mail) with you to your primary  doctor if you chose to have them complete this. If your doctor is in the [facility], they do not need this form.     2. Ask your doctor what medicines are safe before surgery.          * If you are on any blood-thinners, we will need to make a plan with your INR clinic or prescribing doctor of when you need to stop these medications before surgery    3. NO MOTRIN, IBUPROFEN, ASPIRIN, ALEVE, GARLIC SUPPLEMENTS or FISH OIL x 7 days prior to surgery ( to prevent excess bleeding and bruising at time of surgery).    4. For same-day surgery, you must arrange for an adult to take you home from the Center. An adult must stay with you for the first 24 hours after surgery. You cannot drive for 24 hours, or longer if you are still taking narcotic medications.      5. Stop drinking alcohol at least 24 hours before surgery.      6. Stop or at least cut down on smoking 24 hours before surgery.     7. Take a bath or shower the night before and the morning of surgery (refer to surgery packet for extra information). You should use the soap that we mailed to you or gave in clinic (2 small bottles). Use the entire bottle of soap for each shower, washing from the neck down, then rinsing off. Sleep in clean clothing and use clean bedding sheets. If your doctor does not give you special soap, buy Hibiclens or Silvia-Stat at the drug store or ask the pharmacist to suggest a brand. Do not put on lotion, powder, perfume, deodorant or make-up after bathing.     8. You can eat a normal meal the night before surgery. Do not eat any solid foods or drink any milk products for 8 hours before surgery. A pre-op nurse will call you the week before surgery to confirm your eating cut-off time, but please listen to this 8-hour rule if you miss their call.     9. You may drink clear liquids until 2 hours before surgery. Clear liquids include water, Gatorade, apple juice, or other liquids you can read through.    10.  If you need FMLA paperwork  filled out for your surgery, please send this to us before surgery if you are able (in-person, fax, or mail). DO NOT give this to the pre-op nurses on the day of surgery or it will get lost.    11. Generally, we recommend 1 week off of work for healing after surgery. If you have a labor-intensive job with heavy lifting, you may need a work-modification and we are able to give you a work letter for this so that you can continue to work.      How to Contact Us:  Send a [employer] message to your provider. Our team will respond to you via [employer]. Occasionally, we will need to call you to get further information.  For urgent matters (Monday-Friday), call the ENT Clinic: [phone] and speak with a call center team member - they will route your call appropriately.   If you'd like to speak directly with a nurse, please find our contact information below. We do our best to check voicemail frequently throughout the day, and will work to call you back within 1-2 days. For urgent matters, please use the general clinic phone numbers listed above.      Sonal MCKEON RN  ENT RN Care Coordinator  Direct: [phone]  Jodi CLARK LPN  Direct: [phone]

## 2024-01-22 NOTE — LETTER
1/22/2024       RE: Gerry Palafox  [address] [place] [address]     Dear Colleague,    Thank you for referring your patient, Gerry Palafox, to the [facility] [place] [facility]. Please see a copy of my visit note below.    HISTORY OF PRESENT ILLNESS:   Gerry Palafox is a 48 year old year old male who presents today for a second opinion on tonsillectomy. Patient was seen by Dr. Mendiola about a week ago for tonsillar hypertrophy. He has been dealing with enlarged tonsils that are very disruptive for him.  He gets recurrent infections.  They become so large that they interfere with his swallowing.  He has obstructive sleep apnea.    PHYSICAL EXAMINATION:  In no acute distress. Normal mood, normal affect, alert, and appropriate. Head is normocephalic. Cranial nerve VII is House-Backmann I out of VI bilaterally. Breathing without difficulty or stridor. Eyes are anicteric.     Examination of the oropharynx shows 3+ cryptic tonsils.    ASSESSMENT/PLAN:   Gerry Palafox is a 48 year old year old male who presents today for a secondary opinion for a possible tonsillectomy.  At this point I have recommended bilateral tonsillectomy.  I feel comfortable doing this at the same time.  I discussed the risks and benefits with the patient and he wishes to proceed.        Scribe Disclosure:   I, Rafiq Phillips, am serving as a scribe; to document services personally performed by Peter Beavers MD -based on data collection and the provider's statements to me.     Provider Disclosure:  I agree with above History, Review of Systems, Physical exam and Plan.  I have reviewed the content of the documentation and have edited it as needed. I have personally performed the services documented here and the documentation accurately represents those services and the decisions I have made.      Electronically signed by:  Peter Beavers MD

## 2024-01-22 NOTE — DISCHARGE INSTRUCTIONS

## 2024-01-30 ENCOUNTER — TELEPHONE (OUTPATIENT)
Dept: OTOLARYNGOLOGY | Facility: CLINIC | Age: 49
End: 2024-01-30
Payer: COMMERCIAL

## 2024-01-30 PROBLEM — J35.1 TONSILLAR HYPERTROPHY: Status: ACTIVE | Noted: 2024-01-22

## 2024-01-30 NOTE — TELEPHONE ENCOUNTER
Patient is scheduled for surgery with Dr. Beavers.     Spoke with: Patient     Date of Surgery: 5/20/2024, also offered patient 4/29/24 and 5/06/24.    Location: [facility] OR     Pre op with Provider: VIANCA     H&P: Patient will schedule pre op with Pooja Rodriguez. Informed patient pre op will need to be done within 30 days of surgery date.     Additional imaging/appointments: Patient is scheduled for a 3 week post op with Dr. Mendiola on 6/10/24 at 2:45 PM.     Surgery packet: Per patients preference, will send packet through [facility]. Informed patient arrival time will not be listed within packet.      Additional comments: Informed patient a pre op nurse will call 2-5 days prior to surgery to go over further details/give arrival time.         Haritha Walter on 1/30/2024 at 2:29 PM

## 2024-01-30 NOTE — TELEPHONE ENCOUNTER
Called patient to schedule TONSILLECTOMY (Bilateral)  with Dr. Beavers. No answer, callback number [phone] left on  for patient.     Haritha Walter on 1/30/2024 at 10:55 AM

## 2024-02-02 NOTE — TELEPHONE ENCOUNTER
Patient called in asking if it was possible to reschedule surgery with Dr. Beavers from 5/20 to following week, 5/27/24. Informed patient unfortunately Dr. Beavers will be in clinic that date and not performing surgery and that at this time we do not have his schedule into June. Informed patient I could follow back up should anything else become available. Patient asked to be called should there be any dates that become available in February.     Haritha Walter on 2/2/2024 at 3:08 PM

## 2024-02-21 ENCOUNTER — TELEPHONE (OUTPATIENT)
Dept: OTOLARYNGOLOGY | Facility: CLINIC | Age: 49
End: 2024-02-21
Payer: COMMERCIAL

## 2024-05-09 ASSESSMENT — ASTHMA QUESTIONNAIRES
QUESTION_3 LAST FOUR WEEKS HOW OFTEN DID YOUR ASTHMA SYMPTOMS (WHEEZING, COUGHING, SHORTNESS OF BREATH, CHEST TIGHTNESS OR PAIN) WAKE YOU UP AT NIGHT OR EARLIER THAN USUAL IN THE MORNING: NOT AT ALL
QUESTION_1 LAST FOUR WEEKS HOW MUCH OF THE TIME DID YOUR ASTHMA KEEP YOU FROM GETTING AS MUCH DONE AT WORK, SCHOOL OR AT HOME: NONE OF THE TIME
ACT_TOTALSCORE: 22
QUESTION_2 LAST FOUR WEEKS HOW OFTEN HAVE YOU HAD SHORTNESS OF BREATH: ONCE OR TWICE A WEEK
QUESTION_5 LAST FOUR WEEKS HOW WOULD YOU RATE YOUR ASTHMA CONTROL: WELL CONTROLLED
QUESTION_4 LAST FOUR WEEKS HOW OFTEN HAVE YOU USED YOUR RESCUE INHALER OR NEBULIZER MEDICATION (SUCH AS ALBUTEROL): ONCE A WEEK OR LESS
ACT_TOTALSCORE: 22

## 2024-05-15 ENCOUNTER — OFFICE VISIT (OUTPATIENT)
Dept: FAMILY MEDICINE | Facility: CLINIC | Age: 49
End: 2024-05-15
Payer: COMMERCIAL

## 2024-05-15 VITALS
RESPIRATION RATE: 16 BRPM | DIASTOLIC BLOOD PRESSURE: 72 MMHG | WEIGHT: 232 LBS | OXYGEN SATURATION: 96 % | BODY MASS INDEX: 36.41 KG/M2 | HEART RATE: 102 BPM | SYSTOLIC BLOOD PRESSURE: 126 MMHG | TEMPERATURE: 98.1 F | HEIGHT: 67 IN

## 2024-05-15 DIAGNOSIS — G47.33 OSA (OBSTRUCTIVE SLEEP APNEA): ICD-10-CM

## 2024-05-15 DIAGNOSIS — Z01.818 PREOP GENERAL PHYSICAL EXAM: Primary | ICD-10-CM

## 2024-05-15 DIAGNOSIS — E66.812 CLASS 2 SEVERE OBESITY DUE TO EXCESS CALORIES WITH SERIOUS COMORBIDITY AND BODY MASS INDEX (BMI) OF 35.0 TO 35.9 IN ADULT (H): ICD-10-CM

## 2024-05-15 DIAGNOSIS — Z12.11 SCREEN FOR COLON CANCER: ICD-10-CM

## 2024-05-15 DIAGNOSIS — J45.30 MILD PERSISTENT ASTHMA WITHOUT COMPLICATION: ICD-10-CM

## 2024-05-15 DIAGNOSIS — J35.1 TONSILLAR HYPERTROPHY: ICD-10-CM

## 2024-05-15 DIAGNOSIS — J30.2 CHRONIC SEASONAL ALLERGIC RHINITIS: ICD-10-CM

## 2024-05-15 DIAGNOSIS — E66.01 CLASS 2 SEVERE OBESITY DUE TO EXCESS CALORIES WITH SERIOUS COMORBIDITY AND BODY MASS INDEX (BMI) OF 35.0 TO 35.9 IN ADULT (H): ICD-10-CM

## 2024-05-15 DIAGNOSIS — E78.5 HYPERLIPIDEMIA LDL GOAL <160: ICD-10-CM

## 2024-05-15 PROCEDURE — 99214 OFFICE O/P EST MOD 30 MIN: CPT | Performed by: NURSE PRACTITIONER

## 2024-05-15 RX ORDER — ALBUTEROL SULFATE 90 UG/1
1-2 AEROSOL, METERED RESPIRATORY (INHALATION) EVERY 4 HOURS PRN
Qty: 8.5 G | Refills: 0 | Status: SHIPPED | OUTPATIENT
Start: 2024-05-15

## 2024-05-15 NOTE — PROGRESS NOTES
Preoperative Evaluation  [facility]  [address]  [address]  [facility] [address]  Phone: [phone]  Fax: [phone]  Primary Provider: CHARO Shepard CNP  Pre-op Performing Provider: CHARO Shepard CNP  May 15, 2024     Surgical Information  Surgery/Procedure: Tonsillectomy  Surgery Location: [facility]  Surgeon: Dr Beavers  Surgery Date: 5/20/2024  Time of Surgery: 12:00  Where patient plans to recover: At home with family  Fax number for surgical facility: Note does not need to be faxed, will be available electronically in Epic.    Assessment & Plan     The proposed surgical procedure is considered LOW risk.    Problem List Items Addressed This Visit          Respiratory    Tonsillar hypertrophy    TERRIE (obstructive sleep apnea) AHI 21.8    Mild persistent asthma    Relevant Medications    albuterol (PROAIR HFA/PROVENTIL HFA/VENTOLIN HFA) 108 (90 Base) MCG/ACT inhaler    Chronic seasonal allergic rhinitis, unspecified trigger    Relevant Medications    albuterol (PROAIR HFA/PROVENTIL HFA/VENTOLIN HFA) 108 (90 Base) MCG/ACT inhaler       Digestive    Class 2 severe obesity due to excess calories with serious comorbidity in adult (H)       Endocrine    Hyperlipidemia LDL goal <160     Other Visit Diagnoses       Preop general physical exam    -  Primary    Screen for colon cancer        Relevant Orders    Colonoscopy Screening  Referral                    - No identified additional risk factors other than previously addressed    Antiplatelet or Anticoagulation Medication Instructions   - Patient is on no antiplatelet or anticoagulation medications.    Additional Medication Instructions   - rescue Inhaler: Continue PRN. Bring to hospital on the day of surgery.    Recommendation  Approval given to proceed with proposed procedure, without further diagnostic evaluation.        Inna Garrett is a 48 year old, presenting for the following:  Pre-Op  Exam ([facility]onsil[facility] Dr Peter Beavers)          5/15/2024     2:21 PM   Additional Questions   Roomed by Patricia SOLOMON related to upcoming procedure: Hypertrophy of tonsils and TERRIE        5/9/2024   Pre-Op Questionnaire   Have you ever had a heart attack or stroke? No   Have you ever had surgery on your heart or blood vessels, such as a stent placement, a coronary artery bypass, or surgery on an artery in your head, neck, heart, or legs? No   Do you have chest pain with activity? No   Do you have a history of heart failure? No   Do you currently have a cold, bronchitis or symptoms of other infection? No   Do you have a cough, shortness of breath, or wheezing? No   Do you or anyone in your family have previous history of blood clots? No   Do you or does anyone in your family have a serious bleeding problem such as prolonged bleeding following surgeries or cuts? No   Have you ever had problems with anemia or been told to take iron pills? No   Have you had any abnormal blood loss such as black, tarry or bloody stools? No   Have you ever had a blood transfusion? No   Are you willing to have a blood transfusion if it is medically needed before, during, or after your surgery? Yes   Have you or any of your relatives ever had problems with anesthesia? No   Do you have sleep apnea, excessive snoring or daytime drowsiness? YES - TERRIE   Do you have a CPAP machine? Yes   Do you have any artifical heart valves or other implanted medical devices like a pacemaker, defibrillator, or continuous glucose monitor? No   Do you have artificial joints? No   Are you allergic to latex? No     Health Care Directive  Patient does not have a Health Care Directive or Living Will: Patient states has Advance Directive and will bring in a copy to clinic.    Preoperative Review of    reviewed - no record of controlled substances prescribed.      Status of Chronic Conditions:  ASTHMA - Patient has a  longstanding history of mild intermittentAsthma . Patient has been doing well overall noting NO SYMPTOMS and continues on medication regimen consisting of albuterol without adverse reactions or side effects.     Patient Active Problem List    Diagnosis Date Noted     Tonsillar hypertrophy 01/22/2024     Priority: Medium     Class 2 severe obesity due to excess calories with serious comorbidity in adult (H) 01/16/2024     Priority: Medium     Right peroneal tendinosis 03/14/2023     Priority: Medium     Grade 1 ankle sprain 03/14/2023     Priority: Medium     Chronic seasonal allergic rhinitis, unspecified trigger 05/21/2018     Priority: Medium     Mild persistent asthma      Priority: Medium     Pain in joint, ankle and foot 09/18/2014     Priority: Medium     Other postprocedural status(V45.89) 09/18/2014     Priority: Medium     TERRIE (obstructive sleep apnea) AHI 21.8 07/30/2014     Priority: Medium     HST 7/30/2014 50 min below 88%       Hyperlipidemia LDL goal <160 07/24/2014     Priority: Medium      Past Medical History:   Diagnosis Date     Acute idiopathic gout, unspecified site      Mild persistent asthma      Sleep apnea     CPAP--regularly     Past Surgical History:   Procedure Laterality Date     HERNIORRHAPHY UMBILICAL N/A 05/24/2018    Procedure: HERNIORRHAPHY UMBILICAL;  Open Mesh Repair Umbilical Hernia;  Surgeon: Torsten Morelos MD;  Location:  OR     Current Outpatient Medications   Medication Sig Dispense Refill     albuterol (2.5 MG/3ML) 0.083% neb solution Take 1 vial (2.5 mg) by nebulization every 6 hours as needed for shortness of breath / dyspnea or wheezing 30 vial 0     albuterol (PROAIR HFA/PROVENTIL HFA/VENTOLIN HFA) 108 (90 Base) MCG/ACT inhaler Inhale 1-2 puffs into the lungs every 4 hours as needed for shortness of breath / dyspnea or wheezing Needs follow up appointment before further refills 8.5 g 0     allopurinol (ZYLOPRIM) 100 MG tablet Take 1.5 tablets (150 mg) by mouth  daily 135 tablet 3     IBUPROFEN PO Take 400 mg by mouth 2 times daily as needed for moderate pain       minoxidil (LONITEN) 2.5 MG tablet        naproxen (NAPROSYN) 500 MG tablet TAKE 1 TABLET (500 MG) BY MOUTH 2 TIMES DAILY AS NEEDED FOR MODERATE PAIN (RATED 4-6)         Allergies   Allergen Reactions     Penicillin G      hives     Penicillins Hives     hives        Social History     Tobacco Use     Smoking status: Never     Smokeless tobacco: Never   Substance Use Topics     Alcohol use: Yes     Comment: 3-4 beers a couple times per week     Family History   Problem Relation Age of Onset     Arthritis Mother      Cancer Mother         Cancer in the breast     Cancer Father         lung cancer; cigar smoker     Hyperlipidemia Father      Sleep Apnea Father      Diabetes Maternal Grandmother         Not sure if this is maternal or paternal     Cerebrovascular Disease Maternal Grandfather         Not sure if maternal or paternal     Lung Cancer Paternal Grandfather         smoker     Sleep Apnea Brother      Sleep Apnea Brother      No Known Problems Son      No Known Problems Son      No Known Problems Son      Colon Cancer No family hx of      History   Drug Use No             Review of Systems  CONSTITUTIONAL: NEGATIVE for fever, chills, change in weight  INTEGUMENTARY/SKIN: NEGATIVE for worrisome rashes, moles or lesions  EYES: NEGATIVE for vision changes or irritation  ENT/MOUTH: NEGATIVE for ear, mouth and throat problems  RESP: NEGATIVE for significant cough or SOB  CV: NEGATIVE for chest pain, palpitations or peripheral edema  GI: NEGATIVE for nausea, abdominal pain, heartburn, or change in bowel habits  : NEGATIVE for frequency, dysuria, or hematuria  MUSCULOSKELETAL: NEGATIVE for significant arthralgias or myalgia  ENDOCRINE: NEGATIVE for temperature intolerance, skin/hair changes  HEME: NEGATIVE for bleeding problems  PSYCHIATRIC: NEGATIVE for changes in mood or affect    Objective    /72 (BP  "Location: Left arm, Patient Position: Sitting, Cuff Size: Adult Large)   Pulse 102   Temp 98.1  F (36.7  C) (Temporal)   Resp 16   Ht 1.691 m (5' 6.58\")   Wt 105.2 kg (232 lb)   SpO2 96%   BMI 36.80 kg/m     Estimated body mass index is 36.8 kg/m  as calculated from the following:    Height as of this encounter: 1.691 m (5' 6.58\").    Weight as of this encounter: 105.2 kg (232 lb).  Physical Exam  Constitutional:       Appearance: Normal appearance. He is normal weight.   HENT:      Head: Normocephalic and atraumatic.      Right Ear: Tympanic membrane normal.      Left Ear: Tympanic membrane normal.      Nose: Nose normal.      Mouth/Throat:      Mouth: Mucous membranes are dry.      Pharynx: Oropharynx is clear.      Tonsils: 2+ on the right. 2+ on the left.   Eyes:      Extraocular Movements: Extraocular movements intact.      Pupils: Pupils are equal, round, and reactive to light.   Cardiovascular:      Rate and Rhythm: Normal rate and regular rhythm.   Pulmonary:      Effort: Pulmonary effort is normal.      Breath sounds: Normal breath sounds.   Abdominal:      General: Abdomen is flat. Bowel sounds are normal.      Palpations: Abdomen is soft.   Musculoskeletal:         General: Normal range of motion.      Cervical back: Normal range of motion and neck supple.   Skin:     General: Skin is warm and dry.   Neurological:      General: No focal deficit present.      Mental Status: He is alert and oriented to person, place, and time.   Psychiatric:         Mood and Affect: Mood normal.      "

## 2024-05-17 ENCOUNTER — ANESTHESIA EVENT (OUTPATIENT)
Dept: SURGERY | Facility: AMBULATORY SURGERY CENTER | Age: 49
End: 2024-05-17
Payer: COMMERCIAL

## 2024-05-17 NOTE — ANESTHESIA PREPROCEDURE EVALUATION
"Anesthesia Pre-Procedure Evaluation    Patient: Gerry Palafox   MRN: [id number] : 1975        Procedure : Procedure(s):  TONSILLECTOMY BILATERAL          Past Medical History:   Diagnosis Date    Acute idiopathic gout, unspecified site     Mild persistent asthma     Sleep apnea     CPAP--regularly      Past Surgical History:   Procedure Laterality Date    HERNIORRHAPHY UMBILICAL N/A 2018    Procedure: HERNIORRHAPHY UMBILICAL;  Open Mesh Repair Umbilical Hernia;  Surgeon: Torsten Morelos MD;  Location: [facility] OR      Allergies   Allergen Reactions    Penicillin G      hives    Penicillins Hives     hives      Social History     Tobacco Use    Smoking status: Never    Smokeless tobacco: Never   Substance Use Topics    Alcohol use: Yes     Comment: 3-4 beers a couple times per week      Wt Readings from Last 1 Encounters:   05/15/24 105.2 kg (232 lb)        Anesthesia Evaluation            ROS/MED HX  ENT/Pulmonary:     (+) sleep apnea,                    Mild Persistent, asthma                  Neurologic:  - neg neurologic ROS     Cardiovascular:     (+) Dyslipidemia - -   -  - -                                      METS/Exercise Tolerance:     Hematologic:  - neg hematologic  ROS     Musculoskeletal:  - neg musculoskeletal ROS     GI/Hepatic:  - neg GI/hepatic ROS     Renal/Genitourinary:  - neg Renal ROS     Endo:     (+)               Obesity,       Psychiatric/Substance Use:  - neg psychiatric ROS     Infectious Disease:  - neg infectious disease ROS     Malignancy:       Other:            Physical Exam    Airway  airway exam normal      Mallampati: I       Respiratory Devices and Support         Dental       (+) Minor Abnormalities - some fillings, tiny chips      Cardiovascular   cardiovascular exam normal          Pulmonary   pulmonary exam normal                OUTSIDE LABS:  CBC: No results found for: \"WBC\", \"HGB\", \"HCT\", \"PLT\"  BMP:   Lab Results   Component Value Date     2023 " "    02/20/2023    POTASSIUM 3.8 07/18/2023    POTASSIUM 4.1 02/20/2023    CHLORIDE 100 07/18/2023    CHLORIDE 101 02/20/2023    CO2 22 07/18/2023    CO2 25 02/20/2023    BUN 10.8 07/18/2023    BUN 14.7 02/20/2023    CR 0.72 07/18/2023    CR 0.65 (L) 02/20/2023    GLC 71 07/18/2023     (H) 02/20/2023     COAGS: No results found for: \"PTT\", \"INR\", \"FIBR\"  POC: No results found for: \"BGM\", \"HCG\", \"HCGS\"  HEPATIC:   Lab Results   Component Value Date    ALBUMIN 4.7 07/18/2023    PROTTOTAL 8.0 07/18/2023    ALT 35 07/18/2023    AST 35 07/18/2023    ALKPHOS 62 07/18/2023    BILITOTAL 0.8 07/18/2023     OTHER:   Lab Results   Component Value Date    A1C 5.3 07/18/2023    GIOVANNA 9.5 07/18/2023    CRP 15.0 (H) 05/29/2018    SED 17 (H) 05/29/2018       Anesthesia Plan    ASA Status:  2    NPO Status:  NPO Appropriate    Anesthesia Type: General.     - Airway: ETT   Induction: Intravenous, Propofol.   Maintenance: TIVA.        Consents    Anesthesia Plan(s) and associated risks, benefits, and realistic alternatives discussed. Questions answered and patient/representative(s) expressed understanding.     - Discussed: Risks, Benefits and Alternatives for the PROCEDURE were discussed     - Discussed with:  Patient            Postoperative Care    Pain management: Oral pain medications, IV analgesics, Multi-modal analgesia.   PONV prophylaxis: Ondansetron (or other 5HT-3), Dexamethasone or Solumedrol, Background Propofol Infusion     Comments:               Reinier Nice MD    I have reviewed the pertinent notes and labs in the chart from the past 30 days and (re)examined the patient.  Any updates or changes from those notes are reflected in this note.              # Obesity: Estimated body mass index is 36.8 kg/m  as calculated from the following:    Height as of 5/15/24: 1.691 m (5' 6.58\").    Weight as of 5/15/24: 105.2 kg (232 lb).      "

## 2024-05-20 ENCOUNTER — ANESTHESIA (OUTPATIENT)
Dept: SURGERY | Facility: AMBULATORY SURGERY CENTER | Age: 49
End: 2024-05-20
Payer: COMMERCIAL

## 2024-05-20 ENCOUNTER — HOSPITAL ENCOUNTER (OUTPATIENT)
Facility: AMBULATORY SURGERY CENTER | Age: 49
Discharge: HOME | End: 2024-05-20
Attending: OTOLARYNGOLOGY
Payer: COMMERCIAL

## 2024-05-20 VITALS
HEIGHT: 66 IN | SYSTOLIC BLOOD PRESSURE: 140 MMHG | OXYGEN SATURATION: 96 % | HEART RATE: 73 BPM | WEIGHT: 225 LBS | BODY MASS INDEX: 36.16 KG/M2 | TEMPERATURE: 97.5 F | RESPIRATION RATE: 15 BRPM | DIASTOLIC BLOOD PRESSURE: 97 MMHG

## 2024-05-20 DIAGNOSIS — J35.1 TONSILLAR HYPERTROPHY: Primary | ICD-10-CM

## 2024-05-20 PROCEDURE — 42826 REMOVAL OF TONSILS: CPT | Performed by: NURSE ANESTHETIST, CERTIFIED REGISTERED

## 2024-05-20 PROCEDURE — 88304 TISSUE EXAM BY PATHOLOGIST: CPT | Mod: 26 | Performed by: PATHOLOGY

## 2024-05-20 PROCEDURE — 42826 REMOVAL OF TONSILS: CPT | Performed by: OTOLARYNGOLOGY

## 2024-05-20 PROCEDURE — 88304 TISSUE EXAM BY PATHOLOGIST: CPT | Mod: TC | Performed by: OTOLARYNGOLOGY

## 2024-05-20 PROCEDURE — 42826 REMOVAL OF TONSILS: CPT | Performed by: ANESTHESIOLOGY

## 2024-05-20 RX ORDER — ACETAMINOPHEN 325 MG/1
975 TABLET ORAL ONCE
Status: COMPLETED | OUTPATIENT
Start: 2024-05-20 | End: 2024-05-20

## 2024-05-20 RX ORDER — LABETALOL HYDROCHLORIDE 5 MG/ML
10 INJECTION, SOLUTION INTRAVENOUS
Status: DISCONTINUED | OUTPATIENT
Start: 2024-05-20 | End: 2024-05-20 | Stop reason: HOSPADM

## 2024-05-20 RX ORDER — HYDROMORPHONE HYDROCHLORIDE 1 MG/ML
0.4 INJECTION, SOLUTION INTRAMUSCULAR; INTRAVENOUS; SUBCUTANEOUS EVERY 5 MIN PRN
Status: DISCONTINUED | OUTPATIENT
Start: 2024-05-20 | End: 2024-05-20 | Stop reason: HOSPADM

## 2024-05-20 RX ORDER — ONDANSETRON 2 MG/ML
INJECTION INTRAMUSCULAR; INTRAVENOUS PRN
Status: DISCONTINUED | OUTPATIENT
Start: 2024-05-20 | End: 2024-05-20

## 2024-05-20 RX ORDER — PROPOFOL 10 MG/ML
INJECTION, EMULSION INTRAVENOUS CONTINUOUS PRN
Status: DISCONTINUED | OUTPATIENT
Start: 2024-05-20 | End: 2024-05-20

## 2024-05-20 RX ORDER — DEXAMETHASONE SODIUM PHOSPHATE 10 MG/ML
4 INJECTION, SOLUTION INTRAMUSCULAR; INTRAVENOUS
Status: DISCONTINUED | OUTPATIENT
Start: 2024-05-20 | End: 2024-05-20 | Stop reason: HOSPADM

## 2024-05-20 RX ORDER — FENTANYL CITRATE 50 UG/ML
25 INJECTION, SOLUTION INTRAMUSCULAR; INTRAVENOUS
Status: DISCONTINUED | OUTPATIENT
Start: 2024-05-20 | End: 2024-05-21 | Stop reason: HOSPADM

## 2024-05-20 RX ORDER — DEXAMETHASONE SODIUM PHOSPHATE 4 MG/ML
INJECTION, SOLUTION INTRA-ARTICULAR; INTRALESIONAL; INTRAMUSCULAR; INTRAVENOUS; SOFT TISSUE PRN
Status: DISCONTINUED | OUTPATIENT
Start: 2024-05-20 | End: 2024-05-20

## 2024-05-20 RX ORDER — NALOXONE HYDROCHLORIDE 0.4 MG/ML
0.1 INJECTION, SOLUTION INTRAMUSCULAR; INTRAVENOUS; SUBCUTANEOUS
Status: DISCONTINUED | OUTPATIENT
Start: 2024-05-20 | End: 2024-05-20 | Stop reason: HOSPADM

## 2024-05-20 RX ORDER — AMOXICILLIN 250 MG
1-2 CAPSULE ORAL 2 TIMES DAILY
Qty: 30 TABLET | Refills: 0 | Status: SHIPPED | OUTPATIENT
Start: 2024-05-20

## 2024-05-20 RX ORDER — BUPIVACAINE HYDROCHLORIDE 2.5 MG/ML
INJECTION, SOLUTION INFILTRATION; PERINEURAL PRN
Status: DISCONTINUED | OUTPATIENT
Start: 2024-05-20 | End: 2024-05-20 | Stop reason: HOSPADM

## 2024-05-20 RX ORDER — PROPOFOL 10 MG/ML
INJECTION, EMULSION INTRAVENOUS PRN
Status: DISCONTINUED | OUTPATIENT
Start: 2024-05-20 | End: 2024-05-20

## 2024-05-20 RX ORDER — OXYCODONE HCL 5 MG/5 ML
5-10 SOLUTION, ORAL ORAL EVERY 4 HOURS PRN
Qty: 300 ML | Refills: 0 | Status: SHIPPED | OUTPATIENT
Start: 2024-05-20 | End: 2024-05-28

## 2024-05-20 RX ORDER — ONDANSETRON 2 MG/ML
4 INJECTION INTRAMUSCULAR; INTRAVENOUS EVERY 30 MIN PRN
Status: DISCONTINUED | OUTPATIENT
Start: 2024-05-20 | End: 2024-05-21 | Stop reason: HOSPADM

## 2024-05-20 RX ORDER — ONDANSETRON 4 MG/1
4 TABLET, ORALLY DISINTEGRATING ORAL EVERY 30 MIN PRN
Status: DISCONTINUED | OUTPATIENT
Start: 2024-05-20 | End: 2024-05-20 | Stop reason: HOSPADM

## 2024-05-20 RX ORDER — FENTANYL CITRATE 50 UG/ML
50 INJECTION, SOLUTION INTRAMUSCULAR; INTRAVENOUS EVERY 5 MIN PRN
Status: DISCONTINUED | OUTPATIENT
Start: 2024-05-20 | End: 2024-05-20 | Stop reason: HOSPADM

## 2024-05-20 RX ORDER — FENTANYL CITRATE 50 UG/ML
25 INJECTION, SOLUTION INTRAMUSCULAR; INTRAVENOUS EVERY 5 MIN PRN
Status: DISCONTINUED | OUTPATIENT
Start: 2024-05-20 | End: 2024-05-20 | Stop reason: HOSPADM

## 2024-05-20 RX ORDER — FENTANYL CITRATE 50 UG/ML
INJECTION, SOLUTION INTRAMUSCULAR; INTRAVENOUS PRN
Status: DISCONTINUED | OUTPATIENT
Start: 2024-05-20 | End: 2024-05-20

## 2024-05-20 RX ORDER — HYDROMORPHONE HYDROCHLORIDE 1 MG/ML
0.2 INJECTION, SOLUTION INTRAMUSCULAR; INTRAVENOUS; SUBCUTANEOUS EVERY 5 MIN PRN
Status: DISCONTINUED | OUTPATIENT
Start: 2024-05-20 | End: 2024-05-20 | Stop reason: HOSPADM

## 2024-05-20 RX ORDER — OXYCODONE HYDROCHLORIDE 5 MG/1
5 TABLET ORAL
Status: COMPLETED | OUTPATIENT
Start: 2024-05-20 | End: 2024-05-20

## 2024-05-20 RX ORDER — MEPERIDINE HYDROCHLORIDE 25 MG/ML
12.5 INJECTION INTRAMUSCULAR; INTRAVENOUS; SUBCUTANEOUS EVERY 5 MIN PRN
Status: DISCONTINUED | OUTPATIENT
Start: 2024-05-20 | End: 2024-05-20 | Stop reason: HOSPADM

## 2024-05-20 RX ORDER — ONDANSETRON 4 MG/1
4 TABLET, ORALLY DISINTEGRATING ORAL EVERY 30 MIN PRN
Status: DISCONTINUED | OUTPATIENT
Start: 2024-05-20 | End: 2024-05-21 | Stop reason: HOSPADM

## 2024-05-20 RX ORDER — OXYCODONE HYDROCHLORIDE 5 MG/1
10 TABLET ORAL
Status: DISCONTINUED | OUTPATIENT
Start: 2024-05-20 | End: 2024-05-21 | Stop reason: HOSPADM

## 2024-05-20 RX ORDER — SODIUM CHLORIDE, SODIUM LACTATE, POTASSIUM CHLORIDE, CALCIUM CHLORIDE 600; 310; 30; 20 MG/100ML; MG/100ML; MG/100ML; MG/100ML
INJECTION, SOLUTION INTRAVENOUS CONTINUOUS
Status: DISCONTINUED | OUTPATIENT
Start: 2024-05-20 | End: 2024-05-20 | Stop reason: HOSPADM

## 2024-05-20 RX ORDER — DEXAMETHASONE SODIUM PHOSPHATE 10 MG/ML
10 INJECTION, SOLUTION INTRAMUSCULAR; INTRAVENOUS ONCE
Status: DISCONTINUED | OUTPATIENT
Start: 2024-05-20 | End: 2024-05-20 | Stop reason: HOSPADM

## 2024-05-20 RX ORDER — NALOXONE HYDROCHLORIDE 0.4 MG/ML
0.1 INJECTION, SOLUTION INTRAMUSCULAR; INTRAVENOUS; SUBCUTANEOUS
Status: DISCONTINUED | OUTPATIENT
Start: 2024-05-20 | End: 2024-05-21 | Stop reason: HOSPADM

## 2024-05-20 RX ORDER — LIDOCAINE HYDROCHLORIDE 20 MG/ML
INJECTION, SOLUTION INFILTRATION; PERINEURAL PRN
Status: DISCONTINUED | OUTPATIENT
Start: 2024-05-20 | End: 2024-05-20

## 2024-05-20 RX ORDER — IBUPROFEN 100 MG/5ML
600 SUSPENSION, ORAL (FINAL DOSE FORM) ORAL EVERY 6 HOURS PRN
Qty: 800 ML | Refills: 0 | Status: SHIPPED | OUTPATIENT
Start: 2024-05-20

## 2024-05-20 RX ORDER — ACETAMINOPHEN 160 MG/5ML
500 SUSPENSION ORAL EVERY 6 HOURS PRN
Qty: 700 ML | Refills: 0 | Status: SHIPPED | OUTPATIENT
Start: 2024-05-20

## 2024-05-20 RX ORDER — HYDROCODONE BITARTRATE AND ACETAMINOPHEN 7.5; 325 MG/15ML; MG/15ML
10 SOLUTION ORAL
Status: DISCONTINUED | OUTPATIENT
Start: 2024-05-20 | End: 2024-05-21 | Stop reason: HOSPADM

## 2024-05-20 RX ORDER — DEXAMETHASONE SODIUM PHOSPHATE 10 MG/ML
4 INJECTION, SOLUTION INTRAMUSCULAR; INTRAVENOUS
Status: DISCONTINUED | OUTPATIENT
Start: 2024-05-20 | End: 2024-05-21 | Stop reason: HOSPADM

## 2024-05-20 RX ORDER — ONDANSETRON 4 MG/1
4 TABLET, ORALLY DISINTEGRATING ORAL EVERY 8 HOURS PRN
Qty: 9 TABLET | Refills: 0 | Status: SHIPPED | OUTPATIENT
Start: 2024-05-20

## 2024-05-20 RX ORDER — ONDANSETRON 4 MG/1
4 TABLET, ORALLY DISINTEGRATING ORAL
Status: DISCONTINUED | OUTPATIENT
Start: 2024-05-20 | End: 2024-05-21 | Stop reason: HOSPADM

## 2024-05-20 RX ORDER — LIDOCAINE 40 MG/G
CREAM TOPICAL
Status: DISCONTINUED | OUTPATIENT
Start: 2024-05-20 | End: 2024-05-20 | Stop reason: HOSPADM

## 2024-05-20 RX ORDER — ONDANSETRON 2 MG/ML
4 INJECTION INTRAMUSCULAR; INTRAVENOUS EVERY 30 MIN PRN
Status: DISCONTINUED | OUTPATIENT
Start: 2024-05-20 | End: 2024-05-20 | Stop reason: HOSPADM

## 2024-05-20 RX ADMIN — FENTANYL CITRATE 100 MCG: 50 INJECTION, SOLUTION INTRAMUSCULAR; INTRAVENOUS at 11:27

## 2024-05-20 RX ADMIN — Medication 50 MG: at 11:27

## 2024-05-20 RX ADMIN — DEXAMETHASONE SODIUM PHOSPHATE 10 MG: 4 INJECTION, SOLUTION INTRA-ARTICULAR; INTRALESIONAL; INTRAMUSCULAR; INTRAVENOUS; SOFT TISSUE at 11:35

## 2024-05-20 RX ADMIN — PROPOFOL 200 MCG/KG/MIN: 10 INJECTION, EMULSION INTRAVENOUS at 11:27

## 2024-05-20 RX ADMIN — PROPOFOL 200 MG: 10 INJECTION, EMULSION INTRAVENOUS at 11:27

## 2024-05-20 RX ADMIN — FENTANYL CITRATE 25 MCG: 50 INJECTION, SOLUTION INTRAMUSCULAR; INTRAVENOUS at 12:32

## 2024-05-20 RX ADMIN — LIDOCAINE HYDROCHLORIDE 100 MG: 20 INJECTION, SOLUTION INFILTRATION; PERINEURAL at 11:27

## 2024-05-20 RX ADMIN — ONDANSETRON 4 MG: 2 INJECTION INTRAMUSCULAR; INTRAVENOUS at 11:35

## 2024-05-20 RX ADMIN — OXYCODONE HYDROCHLORIDE 5 MG: 5 TABLET ORAL at 12:47

## 2024-05-20 RX ADMIN — Medication 0.5 MG: at 11:40

## 2024-05-20 RX ADMIN — FENTANYL CITRATE 25 MCG: 50 INJECTION, SOLUTION INTRAMUSCULAR; INTRAVENOUS at 12:38

## 2024-05-20 RX ADMIN — FENTANYL CITRATE 25 MCG: 50 INJECTION, SOLUTION INTRAMUSCULAR; INTRAVENOUS at 12:43

## 2024-05-20 RX ADMIN — ACETAMINOPHEN 975 MG: 325 TABLET ORAL at 10:38

## 2024-05-20 RX ADMIN — SODIUM CHLORIDE, SODIUM LACTATE, POTASSIUM CHLORIDE, CALCIUM CHLORIDE: 600; 310; 30; 20 INJECTION, SOLUTION INTRAVENOUS at 11:08

## 2024-05-20 RX ADMIN — FENTANYL CITRATE 25 MCG: 50 INJECTION, SOLUTION INTRAMUSCULAR; INTRAVENOUS at 12:27

## 2024-05-20 NOTE — ANESTHESIA PROCEDURE NOTES
Airway       Patient location during procedure: OR       Procedure Start/Stop Times: 5/20/2024 11:28 AM  Staff -        CRNA: Roseanna Malin APRN CRNA       Performed By: CRNAIndications and Patient Condition       Indications for airway management: ervin-procedural       Induction type:intravenous       Mask difficulty assessment: 2 - vent by mask + OA or adjuvant +/- NMBA (Two provder d/t full beard)    Final Airway Details       Final airway type: endotracheal airway       Successful airway: ROSA and ETT - single  Endotracheal Airway Details        ETT size (mm): 8.0       Cuffed: yes       Successful intubation technique: direct laryngoscopy       DL Blade Type: Jose 2       Grade View of Cords: 2       Adjucts: stylet       Position: Center       Measured from: gums/teeth       Secured at (cm): 24       Bite block used: Oral Airway (@ end)    Post intubation assessment        Placement verified by: capnometry, equal breath sounds and chest rise        Number of attempts at approach: 1       Secured with: tape       Ease of procedure: easy       Dentition: Intact and Unchanged    Medication(s) Administered   Medication Administration Time: 5/20/2024 11:28 AM

## 2024-05-20 NOTE — ANESTHESIA CARE TRANSFER NOTE
Patient: Gerry Palafox    Procedure: Procedure(s):  TONSILLECTOMY BILATERAL       Diagnosis: Tonsillar hypertrophy [J35.1]  Diagnosis Additional Information: No value filed.    Anesthesia Type:   General     Note:    Oropharynx: oropharynx clear of all foreign objects  Level of Consciousness: drowsy  Oxygen Supplementation: face mask  Level of Supplemental Oxygen (L/min / FiO2): 6  Independent Airway: airway patency satisfactory and stable  Dentition: dentition unchanged  Vital Signs Stable: post-procedure vital signs reviewed and stable  Report to RN Given: handoff report given  Patient transferred to: PACU  Comments: Resps easy and reg. Report to PACU RN   Handoff Report: Identifed the Patient, Identified the Reponsible Provider, Reviewed the pertinent medical history, Discussed the surgical course, Reviewed Intra-OP anesthesia mangement and issues during anesthesia, Set expectations for post-procedure period and Allowed opportunity for questions and acknowledgement of understanding      Vitals:  Vitals Value Taken Time   /90 05/20/24 1220   Temp 36.9  C (98.4  F) 05/20/24 1220   Pulse 78 05/20/24 1226   Resp 9 05/20/24 1226   SpO2 99 % 05/20/24 1226   Vitals shown include unfiled device data.    Electronically Signed By: CHARO Chakraborty CRNA  May 20, 2024  12:27 PM

## 2024-05-20 NOTE — OP NOTE
Otolaryngology Operative Report   Date of Operation: May 20, 2024   patient: Gerry Palafox  Mrn: [id number]     Staff Surgeon: Peter Beavers MD     Preoperative Diagnosis:   1. Recurrent tonsillitis  Postoperative Diagnosis: Same  Procedure:   TONSILLECTOMY BILATERAL, Bilateral   Anesthesia: General     EBL: 20cc  Complications: None        Clinical Indications: Gerry is a 48 year old with history of chronic tonsillitis and was recommended to undergo aforementioned procedure. All risks and benefits were discussed with the consenting party and they elected to proceed with the procedure.  Description of Procedure: The patient was brought to the operating room and placed in supine position on the operating table. The patient was orotracheally intubated under general anesthesia without difficulty. The head was placed into extension with a shoulder roll and the patient was draped in the usual sterile fashion. A timeout was performed to confirm the patient's identity and procedures being performed.  A Peña mouth gag was then placed into the oral cavity and put into suspension. A complete bilateral palatine tonsillectomy was then performed by dissecting each tonsil away from the tonsillar fossa along its capsule using monopolar electrocautery. Hemostasis was achieved with suction cautery. The oropharynx was irrigated and suctioned to remove any excess clot. The gag was let down for a few moments to allow blood return and the tonsillar fossae were again inspected for evidence and control of bleeding with monopolar cautery.  Both tonsillar fossae treated topically with 1/4% marcaine. An orogastric tube was passed and suctioned. The gag was removed.The patient was then extubated and taken to the PACU in satisfactory and stable condition. The patient tolerated the procedure and anesthesia without difficulty.         Peter Beavers MD

## 2024-05-20 NOTE — ANESTHESIA POSTPROCEDURE EVALUATION
Patient: Gerry Palafox    Procedure: Procedure(s):  TONSILLECTOMY BILATERAL       Anesthesia Type:  General    Note:  Disposition: Outpatient   Postop Pain Control: Uneventful            Sign Out: Well controlled pain   PONV: No   Neuro/Psych: Uneventful            Sign Out: Acceptable/Baseline neuro status   Airway/Respiratory: Uneventful            Sign Out: Acceptable/Baseline resp. status   CV/Hemodynamics: Uneventful            Sign Out: Acceptable CV status; No obvious hypovolemia; No obvious fluid overload   Other NRE: NONE   DID A NON-ROUTINE EVENT OCCUR?            Last vitals:  Vitals Value Taken Time   /96 05/20/24 1245   Temp 36.7  C (98.1  F) 05/20/24 1245   Pulse 81 05/20/24 1247   Resp 9 05/20/24 1247   SpO2 97 % 05/20/24 1247   Vitals shown include unfiled device data.    Electronically Signed By: Reinier Nice MD  May 20, 2024  2:41 PM

## 2024-05-20 NOTE — DISCHARGE INSTRUCTIONS
[facility]  Home Care Following Anesthesia  For 24 hours after surgery:  Get plenty of rest.  A responsible adult must stay with you for at least 24 hours after you leave the surgery center.  Do not drive or use heavy equipment.  If you have weakness or tingling, don't drive or use heavy equipment until this feeling goes away.   Do not drink alcohol.   Avoid strenuous or risky activities.  Ask for help when climbing stairs.  You may feel lightheaded.  IF so, sit for a few minutes before standing.  Have someone help you get up.   If you have nausea (feel sick to your stomach): Drink only clear liquids such as apple juice, ginger ale, broth or 7-Up.  Rest may also help.  Be sure to drink enough fluids.  Move to a regular diet as you feel able.   You may have a slight fever.  Call the doctor if your fever is over 100 F (37.7 C) (taken under the tongue) or lasts longer than 24 hours.  You may have a dry mouth, a sore throat, muscle aches or trouble sleeping. These should go away after 24 hours.  Do not make important or legal decisions.   It is recommended to avoid smoking.               Tips for taking pain medications  To get the best pain relief possible, remember these points:  Take pain medications as directed, before pain becomes severe.  Pain medication can upset your stomach: taking it with food may help.  Constipation is a common side effect of pain medication. Drink plenty of  fluids.  Eat foods high in fiber. Take a stool softener if recommended by your doctor or pharmacist.  Do not drink alcohol, drive or operate machinery while taking pain medications.  Ask about other ways to control pain, such as with heat, ice or relaxation.    Tylenol/Acetaminophen Consumption    If you feel your pain relief is insufficient, you may take Tylenol/Acetaminophen in addition to your narcotic pain medication.   Be careful not to exceed 4,000 mg of Tylenol/Acetaminophen in a 24 hour  period from all sources.  If you are taking extra strength Tylenol/acetaminophen (500 mg), the maximum dose is 8 tablets in 24 hours.  If you are taking regular strength acetaminophen (325 mg), the maximum dose is 12 tablets in 24 hours.  Tylenol 975 mg given at 10:30 am.   Ok to take more after 4:30 pm.       Call a doctor for any of the following:  Signs of infection (fever, growing tenderness at the surgery site, a large amount of drainage or bleeding, severe pain, foul-smelling drainage, redness, swelling).  It has been over 8 to 10 hours since surgery and you are still not able to urinate (pass water).  Headache for over 24 hours.  Signs of Covid-19 infection (temperature over 100 degrees, shortness of breath, cough, loss of taste/smell, generalized body aches, persistent headache, chills, sore throat, nausea/vomiting/diarrhea)  Your doctor is:  Dr. Peter Beavers, ENT Otolaryngology: [phone]                    Or dial [phone] and ask for the resident on call for:  ENT Otolaryngology  For emergency care, call the:  [facility]:  [phone] (TTY for hearing impaired: [phone])

## 2024-05-23 LAB
PATH REPORT.COMMENTS IMP SPEC: NORMAL
PATH REPORT.COMMENTS IMP SPEC: NORMAL
PATH REPORT.FINAL DX SPEC: NORMAL
PATH REPORT.GROSS SPEC: NORMAL
PATH REPORT.MICROSCOPIC SPEC OTHER STN: NORMAL
PATH REPORT.RELEVANT HX SPEC: NORMAL
PHOTO IMAGE: NORMAL

## 2024-06-03 ENCOUNTER — OFFICE VISIT (OUTPATIENT)
Dept: OTOLARYNGOLOGY | Facility: CLINIC | Age: 49
End: 2024-06-03
Payer: COMMERCIAL

## 2024-06-03 VITALS
DIASTOLIC BLOOD PRESSURE: 78 MMHG | HEART RATE: 80 BPM | BODY MASS INDEX: 36.69 KG/M2 | HEIGHT: 66 IN | WEIGHT: 228.3 LBS | OXYGEN SATURATION: 97 % | SYSTOLIC BLOOD PRESSURE: 120 MMHG

## 2024-06-03 DIAGNOSIS — Z98.890 POSTOPERATIVE STATE: Primary | ICD-10-CM

## 2024-06-03 PROCEDURE — 99024 POSTOP FOLLOW-UP VISIT: CPT | Performed by: OTOLARYNGOLOGY

## 2024-06-03 RX ORDER — PREDNISONE 20 MG/1
TABLET ORAL
COMMUNITY
Start: 2024-03-31

## 2024-06-03 ASSESSMENT — PAIN SCALES - GENERAL: PAINLEVEL: MODERATE PAIN (5)

## 2024-06-03 NOTE — LETTER
6/3/2024       RE: Gerry Palafox  [address] [place] [address]     Dear Colleague,    Thank you for referring your patient, Gerry Palafox, to the [facility] [place] [facility]. Please see a copy of my visit note below.    HISTORY OF PRESENT ILLNESS:   Gerry Palafox is a 48 year old year old male who presents today for a postop visit. Patient underwent bilateral tonsillectomy on 5/20/24.  Feels that he is doing well.    Oropharyngeal exam looks appropriate.  Healing well.  No concerning findings on exam.    Status post tonsillectomy, doing well, follow-up as needed.    Peter Beavers MD

## 2024-06-03 NOTE — NURSING NOTE
"Chief Complaint   Patient presents with    RECHECK   Blood pressure 120/78, pulse 80, height 1.676 m (5' 6\"), weight 103.6 kg (228 lb 4.8 oz), SpO2 97%. Holden Rubin, EMT    "

## 2024-06-03 NOTE — PROGRESS NOTES
HISTORY OF PRESENT ILLNESS:   Gerry Palafox is a 48 year old year old male who presents today for a postop visit. Patient underwent bilateral tonsillectomy on 5/20/24.  Feels that he is doing well.    Oropharyngeal exam looks appropriate.  Healing well.  No concerning findings on exam.    Status post tonsillectomy, doing well, follow-up as needed.    Peter Beavers MD

## 2024-07-09 ENCOUNTER — APPOINTMENT (OUTPATIENT)
Dept: URBAN - METROPOLITAN AREA CLINIC 260 | Age: 49
Setting detail: DERMATOLOGY
End: 2024-07-09

## 2024-07-09 VITALS — WEIGHT: 220 LBS | HEIGHT: 66 IN

## 2024-07-09 DIAGNOSIS — L64.8 OTHER ANDROGENIC ALOPECIA: ICD-10-CM

## 2024-07-09 DIAGNOSIS — D22 MELANOCYTIC NEVI: ICD-10-CM

## 2024-07-09 DIAGNOSIS — D18.0 HEMANGIOMA: ICD-10-CM

## 2024-07-09 DIAGNOSIS — L81.4 OTHER MELANIN HYPERPIGMENTATION: ICD-10-CM

## 2024-07-09 DIAGNOSIS — Z71.89 OTHER SPECIFIED COUNSELING: ICD-10-CM

## 2024-07-09 DIAGNOSIS — L82.1 OTHER SEBORRHEIC KERATOSIS: ICD-10-CM

## 2024-07-09 PROBLEM — D22.5 MELANOCYTIC NEVI OF TRUNK: Status: ACTIVE | Noted: 2024-07-09

## 2024-07-09 PROBLEM — D18.01 HEMANGIOMA OF SKIN AND SUBCUTANEOUS TISSUE: Status: ACTIVE | Noted: 2024-07-09

## 2024-07-09 PROCEDURE — OTHER MIPS QUALITY: OTHER

## 2024-07-09 PROCEDURE — OTHER COUNSELING: OTHER

## 2024-07-09 PROCEDURE — 99213 OFFICE O/P EST LOW 20 MIN: CPT

## 2024-07-09 PROCEDURE — OTHER PRESCRIPTION: OTHER

## 2024-07-09 PROCEDURE — OTHER PRESCRIPTION MEDICATION MANAGEMENT: OTHER

## 2024-07-09 RX ORDER — MINOXIDIL 2.5 MG/1
2.5 MG TABLET ORAL QD
Qty: 180 | Refills: 3 | Status: ERX

## 2024-07-09 ASSESSMENT — LOCATION SIMPLE DESCRIPTION DERM
LOCATION SIMPLE: LOWER BACK
LOCATION SIMPLE: SCALP
LOCATION SIMPLE: UPPER BACK

## 2024-07-09 ASSESSMENT — LOCATION ZONE DERM
LOCATION ZONE: TRUNK
LOCATION ZONE: SCALP

## 2024-07-09 ASSESSMENT — LOCATION DETAILED DESCRIPTION DERM
LOCATION DETAILED: INFERIOR THORACIC SPINE
LOCATION DETAILED: LEFT SUPERIOR PARIETAL SCALP
LOCATION DETAILED: SUPERIOR LUMBAR SPINE

## 2024-07-09 NOTE — PROCEDURE: PRESCRIPTION MEDICATION MANAGEMENT
Detail Level: Simple
Plan: Follow up yearly for refills or sooner if needed.
Render In Strict Bullet Format?: No
Continue Regimen: Minoxidil 2.5 mg tablet twice daily

## 2024-08-13 ENCOUNTER — MYC MEDICAL ADVICE (OUTPATIENT)
Dept: FAMILY MEDICINE | Facility: CLINIC | Age: 49
End: 2024-08-13
Payer: COMMERCIAL

## 2024-08-13 DIAGNOSIS — M10.071 ACUTE IDIOPATHIC GOUT OF RIGHT ANKLE: ICD-10-CM

## 2024-08-13 NOTE — TELEPHONE ENCOUNTER
Refill request of Allopurinol 100MG   Previously prescribed by Dr. Torres - are you okay managing?     Order pended   Thanks   Virgie MCKEON RN

## 2024-08-14 RX ORDER — ALLOPURINOL 100 MG/1
150 TABLET ORAL DAILY
Qty: 135 TABLET | Refills: 3 | Status: SHIPPED | OUTPATIENT
Start: 2024-08-14

## 2024-09-14 ENCOUNTER — HEALTH MAINTENANCE LETTER (OUTPATIENT)
Age: 49
End: 2024-09-14

## 2025-01-16 ENCOUNTER — MYC MEDICAL ADVICE (OUTPATIENT)
Dept: FAMILY MEDICINE | Facility: CLINIC | Age: 50
End: 2025-01-16
Payer: COMMERCIAL

## 2025-01-16 DIAGNOSIS — J45.30 MILD PERSISTENT ASTHMA WITHOUT COMPLICATION: Primary | ICD-10-CM

## 2025-01-16 RX ORDER — ALBUTEROL SULFATE 90 UG/1
1-2 INHALANT RESPIRATORY (INHALATION) EVERY 4 HOURS PRN
Qty: 8.5 G | Refills: 1 | Status: SHIPPED | OUTPATIENT
Start: 2025-01-16

## 2025-07-09 ENCOUNTER — APPOINTMENT (OUTPATIENT)
Dept: URBAN - METROPOLITAN AREA CLINIC 260 | Age: 50
Setting detail: DERMATOLOGY
End: 2025-07-09

## 2025-07-09 VITALS — WEIGHT: 225 LBS | HEIGHT: 66 IN

## 2025-07-09 DIAGNOSIS — L81.4 OTHER MELANIN HYPERPIGMENTATION: ICD-10-CM

## 2025-07-09 DIAGNOSIS — Z71.89 OTHER SPECIFIED COUNSELING: ICD-10-CM

## 2025-07-09 DIAGNOSIS — L82.1 OTHER SEBORRHEIC KERATOSIS: ICD-10-CM

## 2025-07-09 DIAGNOSIS — D18.0 HEMANGIOMA: ICD-10-CM

## 2025-07-09 DIAGNOSIS — D22 MELANOCYTIC NEVI: ICD-10-CM

## 2025-07-09 DIAGNOSIS — L64.8 OTHER ANDROGENIC ALOPECIA: ICD-10-CM

## 2025-07-09 PROBLEM — D22.5 MELANOCYTIC NEVI OF TRUNK: Status: ACTIVE | Noted: 2025-07-09

## 2025-07-09 PROBLEM — D18.01 HEMANGIOMA OF SKIN AND SUBCUTANEOUS TISSUE: Status: ACTIVE | Noted: 2025-07-09

## 2025-07-09 PROCEDURE — OTHER COUNSELING: OTHER

## 2025-07-09 PROCEDURE — OTHER PRESCRIPTION MEDICATION MANAGEMENT: OTHER

## 2025-07-09 PROCEDURE — OTHER MIPS QUALITY: OTHER

## 2025-07-09 PROCEDURE — OTHER PRESCRIPTION: OTHER

## 2025-07-09 PROCEDURE — 99213 OFFICE O/P EST LOW 20 MIN: CPT

## 2025-07-09 RX ORDER — MINOXIDIL 2.5 MG/1
2.5 MG TABLET ORAL QD
Qty: 180 | Refills: 3 | Status: ERX

## 2025-07-09 ASSESSMENT — LOCATION DETAILED DESCRIPTION DERM
LOCATION DETAILED: LEFT SUPERIOR PARIETAL SCALP
LOCATION DETAILED: SUPERIOR LUMBAR SPINE
LOCATION DETAILED: INFERIOR THORACIC SPINE

## 2025-07-09 ASSESSMENT — LOCATION SIMPLE DESCRIPTION DERM
LOCATION SIMPLE: SCALP
LOCATION SIMPLE: UPPER BACK
LOCATION SIMPLE: LOWER BACK

## 2025-07-09 ASSESSMENT — LOCATION ZONE DERM
LOCATION ZONE: TRUNK
LOCATION ZONE: SCALP

## 2025-07-10 NOTE — TELEPHONE ENCOUNTER
Panel Management Review      Patient has the following on his problem list:     Asthma review     ACT Total Scores 5/25/2017   ACT TOTAL SCORE -   ASTHMA ER VISITS -   ASTHMA HOSPITALIZATIONS -   ACT TOTAL SCORE (Goal Greater than or Equal to 20) 18   In the past 12 months, how many times did you visit the emergency room for your asthma without being admitted to the hospital? 0   In the past 12 months, how many times were you hospitalized overnight because of your asthma? 0      1. Is Asthma diagnosis on the Problem List? Yes    2. Is Asthma listed on Health Maintenance? Yes    3. Patient is due for:  ACT and AAP      Composite cancer screening  Chart review shows that this patient is due/due soon for the following None  Summary:    Patient is due/failing the following:   AAP and ACT    Action needed:   Patient needs to do ACT.    Type of outreach:    Phone, spoke to patient.  ACT completed. Score=22    Questions for provider review:    None                                                                                                                                    Krysten Hinds [facility]       Chart routed to none, closing encounter.           SUBJECTIVE:  Patient ID: Raffy Rutherford Jr.  Chief Complaint: Follow-up and Botulinum Toxin Injection    History of Present Illness:  Raffy Rutherford Jr. is a 75 y.o. male who presents to clinic alone for follow-up of headaches and Botox injections.     07/10/2025- Interval History:  Migraines continue to respond well to Botox, no more than 6-9 headache days per month.  Feels frequency of migraines is well controlled, has been having issues not being able to successfully abort migraines once they begin.  Ubrelvy is not as effective as it previously was.  Prior to starting Botox for chronic migraine prevention, patient was suffering with 20-25/30 headaches days per month and 12-15+/30 moderate to severe migraine days per month.  He has continued to achieve a greater than 50% reduction in the frequency of his migraines with continuation of botox every 12 weeks.  Patient has continued to achieve a greater than 50% reduction in the frequency of their migraines with continued Botox injections.  Wishes to proceed with today's injections as scheduled.     Otherwise, information below is still accurate and current.     02/04/2025- Interval History:  Has noticed headaches begin to worsen in the 1-2 weeks after injecting ajovy and improve in the 2 weeks leading up to his next dose, plans to stop ajovy.  Continues to feel Botox is very effective and does wish to continue with today's injections as scheduled.    The patient has chronic migraines ( G43.719) and suffers from headaches more than 15 days per month, each lasting more than 4 hours with at least 8 attacks that meet criteria for migraine. Patient has continued to achieve a greater than 50% reduction in the frequency of their migraines with continued Botox injections.  Wishes to proceed with today's injections as scheduled.      Otherwise, information below is still accurate and current.      05/21/2024- Interval History:  Migraines have been under very good control over  the last 12 weeks.  Has seen a slight uptick in migraine frequency over the last week or so.  Patient has continued to achieve a greater than 50% reduction in the frequency of their migraines with continued Botox injections.  Wishes to proceed with today's injections as scheduled.      Otherwise, information below is still accurate and current.      03/06/2024- Interval History:  Migraines had been well controlled until about 6 weeks ago when migraines gradually began to return.  Of note, he is currently 5 weeks overdue for his Botox.  Scents continue to be a significant migraine trigger for him, triggers a migraine to begin behind his eyes and eventually extend towards the occipitalis/traps.  More recently has noticed migraines which start with pain in the left trap, pain will eventually extend into the left occipitalis, temporalis, retro-orbitalis.  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  Patient has continued to achieve a greater than 50% reduction in the frequency of their migraines with continued Botox injections.  Wishes to proceed with today's injections as scheduled.      Otherwise, information below is still accurate and current.      11/09/2023- Interval History:  Has found perfume scents continue to be a significant trigger for him, as well as lack of sleep.  Patient has continued to achieve a greater than 50% reduction in the frequency of their migraines with continued Botox injections.  Wishes to proceed with today's injections as scheduled.      Otherwise, information below is still accurate and current.      08/24/2023- Interval History:  Migraines continue to be largely well controlled with combination Botox and Ajovy, he is currently three weeks overdue for his Botox and has had more frequent headaches/migraines during this time, indicating to him Botox is effective.  He has not taken any doses fioricet since last visit.  The Botox injections have achieved well over 50%   improvement in the patient's symptoms. Migraines have been reduced at least 7 days per month and the number of cumulative hours suffering with headaches has been reduced at least 100 total hours per month. Today he does have a headache indicating that the Botox has worn off. Frequency of treatment is every 12 weeks unless no response to the treatments, at which time we will discontinue the injections.     Otherwise, information below is still accurate and current.      05/08/2023- Interval History:  Has seen a noticeable improvement in his migraines since starting Botox injections, was down to 1-2 migraines per week, had a few weeks with no migraines.  Migraines were easily managed with over the counter medications.  Began to notice an uptick in migraine frequency 3 weeks ago, sent message via patient portal, was restarted on ajovy 225 mg SQ monthly.  Feels migraines have leveled out since restarting ajovy.  Denies presence of side effects to Botox, is pleased with the results from first round of Botox, does wish to proceed with second round of Botox as scheduled today.      Otherwise, information below is still accurate and current.      02/13/2023- Interval History:  Headaches continue to occur on a near daily basis with full blown migraines on 12-15 days per month.  He started Ajovy and recently injected third injection, unsure whether or not ajovy was helping.  Seen by outside pain management provider who recommended and prescribed qulipta 10 mg daily, dose increased to 30 mg daily.  For a few days after increasing dose to 30 mg, began to experience issues with insomnia, unsure if this was related to qulipta vs ajovy or combination, but he does feel migraines were better after increasing dose to 30 mg.    Risks, Benefits, and potential side effects of Botox discussed with patient.  Alternative treatments offered.  After thorough discussed regarding the procedure, patient has decided to move forward with  initiating Botox injections for Chronic Migraine.  Patient understands we will complete 3 rounds of injections, if after 3 rounds, we do not see a 50% reduction in headaches, we will discontinue Botox injections.      Otherwise, information below is still accurate and current.      History of Present Illness:   72 y.o. male with headaches, chronic LBP, cervical radiculopathy, anxiety, depression, WINNIE, hx of prostate cancer, who presents for evaluation of headaches via virtual visit.  Patient is established with neurology clinic, previous patient of DESTINY Norman and Dr. Millan, he is a new patient to me.    Currently suffering with headaches at least 1-2 times per week each of which last 2-4 days in duration.  Long history of headahces, beginning around 20 yo, had multiple sinus surgeries thinking headaches were sinus related, no improvement.       Headaches are described as a moderate to severe, stabbing/searing pain beginning left retro-orbitalis, extending towards the occipitalis and into his shoulder/left arm/hand.  Headaches can last anywhere from an hour when successfully treated with fioricet or excedrin, but can last up to 4 days in duration, on average headaches are lasting about a day in duration.  Headaches are often present upon waking.   Associated symptoms include fatigue, crepitus in left shoulder, congested (on left side), more trouble concentrating than norm, photophobia, phonophobia.  Currently experiencing some sort of headache on 20-25/30 days, with migraines occurring on 12-15/30 days.    At last visit with DESTINY Norman, she restarted nurtec 75 mg odt and referred him to me for consideration of Botox. Nurtec every other day was ineffective (though was effective in the past for him).  He was then switched to ajovy 225 mg SQ monthly, was waiting for today's visit to decide whether or not to start using ajovy.   Takes lyrica as prescribed by outside pain management, for chronic back pain.   He is prescribed  lamotrigine 200 mg daily for anxiety/depression.  Takes trazodone 50 mg nightly for insomnia.      Notes from DESTINY Norman:  HPI:   Mr. Raffy Rutherford Jr. is a 72 y.o. male presenting for evaluation regarding chronic low back pain. On chart review he was previously seen by Dr. Millan for both bilateral neuropathy and headache pain. He received an EMG with Dr. 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 His current therapy regimen is Lyrica 200mg QHS. He notes he was previously seen by neurosurgery and instructed to receive an MRI lumbar spine by Dr. Caldwell but was unsure why this was ordered. We discussed that this is likely due to the evidence on the EMG indicating his bilateral foot drop may be due to a lumbar spine issue at L5/S1 level.      Headache History:   Onset- Teenager   Frequency- 5-6 per week  Duration- All day   Location- L eye and radiates to his L jaw and then toward his L shoulder and arm  Associated symptoms- Nasal blockage on the L side, + photophobia,   Denies any aura, denies nausea/vomiting   Alleviating Factors- Half Fioricet can occasionally relieve his headaches  Aggravating Factors- Smells trigger his headaches and stress/ poor sleep, weather      Of note- patient went to [place] and found in the 3 weeks he was there he only had 2-3 headaches which were easily relieved     He states that he is also concerned regarding his headaches for which he is currently taking Ubrelvy QOD and Fioricet for abortive therapy.       Headache:  Type: iron hot stab like pain   Severity: 9/10  Location: left eye, forehead , cheek into left shoulder and into the back   Triggers: Particular scents, looking at screen for a long time   Frequency: worse in the last week, but otherwise 8-9/month   Duration:  Days if he does not take medication (2-3 days) can have a cycles of headaches for several weeks ,  sometimes can go 4 weeks without headache   Aura: none   Photophobia: not as much but does endorse turning the lights off or worsening of headache when he looks at his phone  Phonophobia: ++  Nausea/ vomiting: rarely gets nauseous   Aggravating factors: none   Relieving factors: Medications  Inhales an oil- mydab which has given him significant benefit  Fioricet- none   Ubrelvy- worked okay but Nurtec works better  Voltaren- he takes this for joint pains.   Excedrin- none   Aspirin makes tinnitus worse   Norco- 3 times a day - 1/2 pill Norco in am , 1/2 pill in the afternoon 1/2 + 3/4th pill in the evening. Pain worsens when he tries to taper off his pain medication  He snores at night, he has sleep apnea, he was on CPAP but not very compliant because he states he took the mask off in his sleep. He has tried different masks with no benefit.      Treatments Tried and Response  Nurtec  lyrica  Atogepant  Baclofen   Fioricet  Celebrex  Emgality 09/08/2020 failed  Aimovig  Fioricet   Diamox   Cymbalta   Klonopin   Valium   [place]  Lamictal   Gabapentin   Indometacin   Dilaudid  Verapamil  Nurtec   Ubrelvy   Trazodone  Ajovy   Botox -   Qulipta 60 mg - no, caused insomnia   Ajovy - helping   Lidocaine NS - given today     Current Medications:  Current Medications[1]    Review of Systems - A review of 10+ systems was conducted with pertinent positive and negative findings documented in HPI with all other systems reviewed and negative.    PFSH: Past medical, family, and social history reviewed as documented in chart with pertinent positive medical, family, and social history detailed in HPI.    OBJECTIVE:  Vitals:  /73 (BP Location: Left arm, Patient Position: Sitting)   Pulse 60   Wt 77.7 kg (171 lb 4.8 oz)   BMI 26.05 kg/m²     Physical Exam:  Constitutional: he appears well-developed and well-nourished. he is well groomed. NAD     Review of Data:   Notes from pain medicine, internal medicine reviewed.    Labs:  Lab Visit on 07/01/2025   Component Date Value Ref Range Status    Iron Level 07/01/2025 185 (H)  45 - 160 ug/dL Final    Transferrin 07/01/2025 195 (L)  200 - 375 mg/dL Final    Iron Binding Capacity Total 07/01/2025 289  250 - 450 ug/dL Final    Iron Saturation 07/01/2025 64 (H)  20 - 50 % Final    Ferritin 07/01/2025 64.0  20.0 - 300.0 ng/mL Final    Sodium 07/01/2025 138  136 - 145 mmol/L Final    Potassium 07/01/2025 4.5  3.5 - 5.1 mmol/L Final    Chloride 07/01/2025 100  95 - 110 mmol/L Final    CO2 07/01/2025 30 (H)  23 - 29 mmol/L Final    Glucose 07/01/2025 97  70 - 110 mg/dL Final    BUN 07/01/2025 17  8 - 23 mg/dL Final    Creatinine 07/01/2025 0.8  0.5 - 1.4 mg/dL Final    Calcium 07/01/2025 9.2  8.7 - 10.5 mg/dL Final    Protein Total 07/01/2025 7.2  6.0 - 8.4 gm/dL Final    Albumin 07/01/2025 4.4  3.5 - 5.2 g/dL Final    Bilirubin Total 07/01/2025 0.6  0.1 - 1.0 mg/dL Final    ALP 07/01/2025 40  40 - 150 unit/L Final    AST 07/01/2025 27  11 - 45 unit/L Final    ALT 07/01/2025 37  10 - 44 unit/L Final    Anion Gap 07/01/2025 8  8 - 16 mmol/L Final    eGFR 07/01/2025 >60  >60 mL/min/1.73/m2 Final    Hemoglobin A1c 07/01/2025 5.3  4.0 - 5.6 % Final    Estimated Average Glucose 07/01/2025 105  68 - 131 mg/dL Final    TSH 07/01/2025 0.588  0.400 - 4.000 uIU/mL Final    WBC 07/01/2025 7.27  3.90 - 12.70 K/uL Final    RBC 07/01/2025 4.78  4.60 - 6.20 M/uL Final    HGB 07/01/2025 15.0  14.0 - 18.0 gm/dL Final    HCT 07/01/2025 44.2  40.0 - 54.0 % Final    MCV 07/01/2025 93  82 - 98 fL Final    MCH 07/01/2025 31.4 (H)  27.0 - 31.0 pg Final    MCHC 07/01/2025 33.9  32.0 - 36.0 g/dL Final    RDW 07/01/2025 12.4  11.5 - 14.5 % Final    Platelet Count 07/01/2025 279  150 - 450 K/uL Final    MPV 07/01/2025 9.3  9.2 - 12.9 fL Final    Nucleated RBC 07/01/2025 0  <=0 /100 WBC Final    Neut % 07/01/2025 61.8  38 - 73 % Final    Lymph % 07/01/2025 27.4  18 - 48 % Final    Mono % 07/01/2025 9.5  4 - 15 % Final     Eos % 07/01/2025 0.4  <=8 % Final    Basophil % 07/01/2025 0.6  <=1.9 % Final    Imm Grans % 07/01/2025 0.3  0.0 - 0.5 % Final    Neut # 07/01/2025 4.50  1.8 - 7.7 K/uL Final    Lymph # 07/01/2025 1.99  1 - 4.8 K/uL Final    Mono # 07/01/2025 0.69  0.3 - 1 K/uL Final    Eos # 07/01/2025 0.03  <=0.5 K/uL Final    Baso # 07/01/2025 0.04  <=0.2 K/uL Final    Imm Grans # 07/01/2025 0.02  0.00 - 0.04 K/uL Final   Procedure visit on 02/11/2025   Component Date Value Ref Range Status    Final Pathologic Diagnosis 02/11/2025    Final                    Value:1. Prostate, left apex, needle core biopsy:   - Benign prostatic tissue with atrophic changes     2. Prostate, left middle, needle core biopsy:   - Benign prostatic tissue with atrophic changes     3. Prostate, left base, needle core biopsy:   - Benign prostatic tissue with atrophic changes    4. Prostate, right apex, target lesion 1, needle core biopsy:   - Prostatic adenocarcinoma, acinar type, [place] score 3+4=7, grade group 2 (10% grade 4), present in 2 of multiple core fragments and comprising 10% of the total biopsy volume  - Immunostains for AMACR, HMWK, and p63 (PIN4) have been performed with appropriately reactive controls and the area of interest shows strong AMACR staining, and lacks basal cell staining with p63 and HMWK, supporting the above interpretation.      5. Prostate, right middle, target lesion 2, needle core biopsy:   - Prostatic adenocarcinoma, acinar type, Daniel score 3+4=7, grade group 2 (30% grade 4), present in 3 of multiple core fragments and comprising 20                          % of the total biopsy volume   - Immunostains for AMACR, HMWK, and p63 (PIN4) have been performed with appropriately reactive controls and the area of interest shows strong AMACR staining, and lacks basal cell staining with p63 and HMWK, supporting the above interpretation.     6. Prostate, right base, needle core biopsy:   - Benign prostatic tissue       Gross  02/11/2025    Final                    Value:1: Hospital/clinic label MRN:  [id number]  Pathology label MRN:  [id number]    The specimen is received in formalin labeled &quot;left apex&quot;.  The specimen consists of 2 tan-yellow soft tissue cores measuring 0.1 cm in diameter, and 0.5 cm and 1.2 cm in length.  The specimen is submitted entirely in cassette CRO--1-A.    FRANCES Leone  Gross Technologist   2: Hospital/clinic label MRN:  [id number]  Pathology label MRN:  [id number]    The specimen is received in formalin labeled &quot;left middle&quot;.  The specimen consists of 2 tan-yellow soft tissue cores measuring 0.1 cm in diameter, and 0.9 cm and 1.1 cm in length.  The specimen is submitted entirely in cassette ESK--2-A.    FRANCES Leone  Gross Technologist   3: Hospital/clinic label MRN:  [id number]  Pathology label MRN:  [id number]    The specimen is received in formalin labeled &quot;left base&quot;.  The specimen consists of 2 tan-yellow soft tissue cores measuring 0.1 cm in diameter, and 1.3 cm and 1.4 cm in tank                          gth.  The specimen is submitted entirely in cassette JNI--3-A.    FRANCES Leone  Gross Technologist   4: Hospital/clinic label MRN:  [id number]  Pathology label MRN:  [id number]    The specimen is received in formalin labeled &quot;right apex target lesion 1&quot;.  The specimen consists of multiple tan-yellow soft tissue cores measuring 0.1 cm in diameter, and ranging from 0.4-1.4 cm in length.  The specimen is submitted entirely   in cassette WCB--4-A.    FRANCES Leone  Gross Technologist   5: Hospital/clinic label MRN:  [id number]  Pathology label MRN:  [id number]    The specimen is received in formalin labeled &quot;right middle target lesion 2&quot;.  The specimen consists of multiple tan-yellow soft tissue cores measuring 0.1 cm in diameter, and ranging from 1.0 cm to 1.5 cm in length.  The specimen is submitted   entirely in cassette  UQV--5-A.    FRANCES Leone  Gross Technologist   6: Hospital/clinic label MRN:  [id number]  Pathology label MRN:  [id number]                              The specimen is received in formalin labeled &quot;right base&quot;.  The specimen consists of 2 tan-yellow soft tissue cores measuring 0.1 cm in diameter, and 1.0 cm and 1.6 cm in length.  The specimen is submitted entirely in cassette VHC--6-A.    FRANCES Leone  Gross Technologist      Disclaimer 02/11/2025 Unless the case is a 'gross only' or additional testing only, the final diagnosis for each specimen is based on a microscopic examination of appropriate tissue sections.   Final   Office Visit on 01/24/2025   Component Date Value Ref Range Status    Final Pathologic Diagnosis 01/24/2025    Final                    Value:Skin, right lateral chin, shave biopsy:  -VERRUCA VULGARIS, IRRITATED AND INFLAMED    This lesion is benign.      Gross 01/24/2025    Final                    Value:Hospital/clinic label MRN:  [id number]  Pathology label MRN:  [id number]     The specimen is received in formalin labeled &quot;R lateral chin&quot;. The specimen is a circular shave of hair-bearing, tan-yellow skin measuring 0.5 x 0.5 x 0.3 cm. The epidermal surface is raised and firm. The specimen is inked green at the biopsy   margin, bisected, and submitted entirely in cassette ELJ--1-A.     [place]  Grossing Technologist      Microscopic Exam 01/24/2025    Final                    Value:Shave biopsy sections show an exophytic and endophytic epidermal lesion with acanthosis, verrucous papillomatosis, hypergranulosis, and hyperkeratosis.  The epithelium shows mild reactive atypia and there is an underlying brisk lichenoid inflammatory   infiltrate.  The lesion is positive for p16.  AE1/AE3 highlights the epithelium and adnexal structures.  Ki-67 proliferation index is increased but overall confined to the basal layer of the epidermis.  The lesion is  negative for CD34.    Immunohistochemical stain was reviewed in conjunction with adequate positive control.  The lesion appears excised in the planes of section examined.      Disclaimer 01/24/2025 Unless the case is a 'gross only' or additional testing only, the final diagnosis for each specimen is based on a microscopic examination of appropriate tissue sections.   Final   Lab Visit on 01/16/2025   Component Date Value Ref Range Status    PSA Diagnostic 01/16/2025 10.1 (H)  0.00 - 4.00 ng/mL Final     Imaging:  No results found. However, due to the size of the patient record, not all encounters were searched. Please check Results Review for a complete set of results.    Note: I have independently reviewed any/all imaging/labs/tests and agree with the report (s) as documented.  Any discrepancies will be as noted/demarcated by free text.  GARO BERGER 7/10/2025    ASSESSMENT:  1. Chronic migraine without aura without status migrainosus, not intractable    2. Migraine without status migrainosus, not intractable, unspecified migraine type      PLAN:  - Botox administered in clinic for Chronic Migraine (see below)   - For migraine prevention - continue botox q12 weeks   - does not wish to restart ajovy at this time   - For abortive therapy - ubrelvy 100 mg tab   - secondary treatment option - fioricet   - For rescue - trial lidocaine 4.42% NS    - Triptans contraindicated given daily MAOI transdermal patch as prescribed by PCP for depression   - refills provided  - Continue robaxin 750 mg to take up to three times daily as needed for muscle spasm  - RTC in 12 weeks for repeat Botox injections or sooner if needed          All questions and concerns addressed.  Patient verbalizes understanding and is agreeable with the above stated treatment plan.      PROCEDURE NOTE:  BOTOX was performed as an indicated therapy for intractable chronic migraine headaches given that the patient failed more than 2 headache medications    A  time out was conducted just before the start of the procedure to verify the correct patient and procedure, procedure location, and all relevant critical information.     The Botox injections have achieved well over 50%  improvement in the patient's symptoms. Migraines have been reduced at least 7 days per month and the number of cumulative hours suffering with headaches has been reduced at least 100 total hours per month. Today she does have a headache indicating that the Botox has worn off. Frequency of treatment is every 12 weeks unless no response to the treatments, at which time we will discontinue the injections.    PROCEDURE PERFORMED: Botulinum toxin injection (65562)  CLINICAL INDICATION: G43.719  After risks and benefits were explained including bleeding, infection, worsening of pain, damage to the areas being injected, weakness of muscles, loss of muscle control, dysphagia if injecting the head or neck, facial droop if injecting the facial area, painful injection, allergic or other reaction to the medications being injected, and the failure of the procedure to help the problem, a signed consent was obtained.   The patient was placed in a comfortable area and the sites to be treated were identified.The area to be treated was prepped three times with alcohol and the alcohol allowed to dry. Next, a 30 gauge needle was used to inject the medication in the area to be treated.      Total Botox used: 155 Units   Unavoidable waste: 45 Units     Injection sites:    muscle bilaterally ( a total of 10 units divided into 2 sites)   Procerus muscle (5 units)   Frontalis muscle bilaterally (a total of 20 units divided into 4 sites)   Temporalis muscle bilaterally (a total of 40 units divided into 8 sites)   Occipitalis muscle bilaterally (a total of 30 units divided into 6 sites)   Cervical paraspinal muscles (a total of 20 units divided into 4 sites)   Trapezius muscle bilaterally (a total of 30 units divided  into 6 sites)   Complications: none   RTC for the next Botox injection: 12 weeks     CC: Haseeb Puentes MD [facility], [facility]  [facility]   [phone]           [1]   Current Outpatient Medications:     alendronate (FOSAMAX) 70 MG tablet, Take 1 tablet (70 mg total) by mouth every 7 days. Take with a full glass of water weekly, Disp: 4 tablet, Rfl: 11    atorvastatin (LIPITOR) 40 MG tablet, Take 1 tablet (40 mg total) by mouth once daily., Disp: 90 tablet, Rfl: 1    butalbital-acetaminophen-caffeine -40 mg (FIORICET, ESGIC) -40 mg per tablet, Take 1 tablet by mouth daily as needed for 30 days., Disp: 15 tablet, Rfl: 0    celecoxib (CELEBREX) 200 MG capsule, Take 1 capsule (200 mg total) by mouth 2 (two) times a day., Disp: 240 capsule, Rfl: 0    clindamycin (CLEOCIN) 150 MG capsule, Take 4 capsules 1 hour prior to dental appointment, Disp: 12 capsule, Rfl: 1    clonazePAM (KLONOPIN) 0.5 MG tablet, Take 1 tablet by mouth twice a day as needed for anxiety, Disp: 60 tablet, Rfl: 3    cycloSPORINE (VEVYE) 0.1 % Drop, Place 1 drop into both eyes 2 (two) times a day., Disp: 2 mL, Rfl: 11    ergocalciferol (VITAMIN D2) 50,000 unit Cap, Take 1 capsule (50,000 Units total) by mouth every 7 days., Disp: 12 capsule, Rfl: 3    fremanezumab-vfrm (AJOVY SYRINGE) 225 mg/1.5 mL injection, Inject 1.5 mLs (225 mg total) into the skin every 28 days., Disp: 1.5 mL, Rfl: 5    HYDROcodone-acetaminophen (NORCO) 5-325 mg per tablet, Take 1 tablet by mouth every 8 (eight) hours as needed for Pain., Disp: 90 tablet, Rfl: 0    hydrocortisone-pramoxine (ANALPRAM-HC) 2.5-1 % Crea, Place rectally 3 (three) times daily., Disp: 30 g, Rfl: 2    ketoconazole (NIZORAL) 2 % cream, Apply to affected areas of body folds twice daily as needed for irritation., Disp: 60 g, Rfl: 5    lamoTRIgine (LAMICTAL) 200 MG tablet, Take 1 tablet (200 mg total) by mouth once daily., Disp: 90 tablet, Rfl: 3     methocarbamoL (ROBAXIN) 750 MG Tab, Take 1 tablet (750 mg total) by mouth 3 (three) times daily., Disp: 60 tablet, Rfl: 2    perfluorohexyloctane, PF, (MIEBO, PF,) 100 % Drop, Place 1 drop into both eyes 4 (four) times daily., Disp: 3 mL, Rfl: 11    pregabalin (LYRICA) 25 MG capsule, Take 1 capsule (25 mg total) by mouth 2 (two) times daily., Disp: 180 capsule, Rfl: 1    pregabalin (LYRICA) 75 MG capsule, Take 3 capsules (225 mg total) by mouth daily at night, Disp: 270 capsule, Rfl: 1    RABEprazole (ACIPHEX) 20 mg tablet, Take 1 tablet (20 mg total) by mouth 2 (two) times daily., Disp: 180 tablet, Rfl: 0    selegiline (EMSAM) 12 mg/24 hr, Place 1 patch onto the skin once daily., Disp: 30 patch, Rfl: 11    selegiline (EMSAM) 9 mg/24 hr, Place 1 patch onto the skin once daily., Disp: 30 patch, Rfl: 11    traZODone (DESYREL) 100 MG tablet, Take 1 tablet (100 mg total) by mouth every evening., Disp: 90 tablet, Rfl: 0    triamcinolone acetonide 0.025% (KENALOG) 0.025 % cream, Apply to affected areas of body folds twice daily as needed for irritation. Do not use for longer than 2 weeks in a row., Disp: 30 g, Rfl: 2    ubrogepant (UBRELVY) 100 mg tablet, Take 1 tablet (100 mg total) by mouth every 2 (two) hours as needed for Migraine. Max 200 mg/day., Disp: 16 tablet, Rfl: 5    ubrogepant (UBRELVY) 100 mg tablet, Take 1 tablet (100 mg total) by mouth every 2 (two) hours as needed for Migraine. Max 200 mg/day., Disp: 16 tablet, Rfl: 5

## 2025-07-13 DIAGNOSIS — M10.071 ACUTE IDIOPATHIC GOUT OF RIGHT ANKLE: ICD-10-CM

## 2025-07-14 RX ORDER — ALLOPURINOL 100 MG/1
150 TABLET ORAL DAILY
Qty: 135 TABLET | Refills: 0 | Status: SHIPPED | OUTPATIENT
Start: 2025-07-14

## (undated) DEVICE — LINEN TOWEL PACK X5 5464

## (undated) DEVICE — ESU GROUND PAD ADULT W/CORD E7507

## (undated) DEVICE — SU PDS II 2-0 SH 27" Z317H

## (undated) DEVICE — DRAPE LAP W/ARMBOARD 29410

## (undated) DEVICE — ESU PENCIL W/HOLSTER

## (undated) DEVICE — SOL NACL 0.9% IRRIG 500ML BOTTLE 2F7123

## (undated) DEVICE — DRSG GAUZE 4X8"

## (undated) DEVICE — SU VICRYL 0 CT-2 27" J334H

## (undated) DEVICE — SPECIMEN CONTAINER W/10% BUFFERED FORMALIN 120ML 591201

## (undated) DEVICE — PACK MINOR CUSTOM ASC

## (undated) DEVICE — SPONGE TONSIL W/STRING 7/8" 5PK 10604

## (undated) DEVICE — ESU ELEC BLADE 2.75" COATED/INSULATED E1455

## (undated) DEVICE — PREP CHLORAPREP 26ML TINTED ORANGE  260815

## (undated) DEVICE — GLOVE PROTEXIS W/NEU-THERA 7.5  2D73TE75

## (undated) DEVICE — SUCTION MANIFOLD NEPTUNE 2 SYS 4 PORT 0702-020-000

## (undated) DEVICE — SU MONOCRYL 4-0 PS-2 18" UND Y496G

## (undated) DEVICE — DRSG STERI STRIP 1/2X4" R1547

## (undated) DEVICE — GLOVE BIOGEL PI MICRO SZ 7.5 48575

## (undated) DEVICE — Device

## (undated) DEVICE — SU PDS II 0 CT-2 27" Z334H

## (undated) DEVICE — LINEN GOWN XLG 5407

## (undated) DEVICE — SUCTION TIP YANKAUER W/O VENT K86

## (undated) DEVICE — SU VICRYL 3-0 SH 27" J316H

## (undated) DEVICE — TUBING SUCTION MEDI-VAC 1/4"X20' N620A

## (undated) RX ORDER — TRIAMCINOLONE ACETONIDE 40 MG/ML
INJECTION, SUSPENSION INTRA-ARTICULAR; INTRAMUSCULAR
Status: DISPENSED
Start: 2017-11-18

## (undated) RX ORDER — KETOROLAC TROMETHAMINE 30 MG/ML
INJECTION, SOLUTION INTRAMUSCULAR; INTRAVENOUS
Status: DISPENSED
Start: 2018-05-24

## (undated) RX ORDER — ACETAMINOPHEN 325 MG/1
TABLET ORAL
Status: DISPENSED
Start: 2018-05-24

## (undated) RX ORDER — LIDOCAINE HYDROCHLORIDE 10 MG/ML
INJECTION, SOLUTION EPIDURAL; INFILTRATION; INTRACAUDAL; PERINEURAL
Status: DISPENSED
Start: 2023-05-16

## (undated) RX ORDER — PROPOFOL 10 MG/ML
INJECTION, EMULSION INTRAVENOUS
Status: DISPENSED
Start: 2024-05-20

## (undated) RX ORDER — LIDOCAINE HYDROCHLORIDE 10 MG/ML
INJECTION, SOLUTION INFILTRATION; PERINEURAL
Status: DISPENSED
Start: 2018-12-19

## (undated) RX ORDER — TRIAMCINOLONE ACETONIDE 40 MG/ML
INJECTION, SUSPENSION INTRA-ARTICULAR; INTRAMUSCULAR
Status: DISPENSED
Start: 2018-01-29

## (undated) RX ORDER — ACETAMINOPHEN 325 MG/1
TABLET ORAL
Status: DISPENSED
Start: 2024-05-20

## (undated) RX ORDER — LIDOCAINE HYDROCHLORIDE 10 MG/ML
INJECTION, SOLUTION INFILTRATION; PERINEURAL
Status: DISPENSED
Start: 2018-01-29

## (undated) RX ORDER — HYDROMORPHONE HYDROCHLORIDE 1 MG/ML
INJECTION, SOLUTION INTRAMUSCULAR; INTRAVENOUS; SUBCUTANEOUS
Status: DISPENSED
Start: 2024-05-20

## (undated) RX ORDER — LIDOCAINE HYDROCHLORIDE 10 MG/ML
INJECTION, SOLUTION EPIDURAL; INFILTRATION; INTRACAUDAL; PERINEURAL
Status: DISPENSED
Start: 2020-06-25

## (undated) RX ORDER — TRIAMCINOLONE ACETONIDE 40 MG/ML
INJECTION, SUSPENSION INTRA-ARTICULAR; INTRAMUSCULAR
Status: DISPENSED
Start: 2023-05-16

## (undated) RX ORDER — OXYCODONE HYDROCHLORIDE 5 MG/1
TABLET ORAL
Status: DISPENSED
Start: 2018-05-24

## (undated) RX ORDER — OXYCODONE HYDROCHLORIDE 5 MG/1
TABLET ORAL
Status: DISPENSED
Start: 2024-05-20

## (undated) RX ORDER — ONDANSETRON 2 MG/ML
INJECTION INTRAMUSCULAR; INTRAVENOUS
Status: DISPENSED
Start: 2024-05-20

## (undated) RX ORDER — PROPOFOL 10 MG/ML
INJECTION, EMULSION INTRAVENOUS
Status: DISPENSED
Start: 2018-05-24

## (undated) RX ORDER — GABAPENTIN 300 MG/1
CAPSULE ORAL
Status: DISPENSED
Start: 2018-05-24

## (undated) RX ORDER — FENTANYL CITRATE 50 UG/ML
INJECTION, SOLUTION INTRAMUSCULAR; INTRAVENOUS
Status: DISPENSED
Start: 2024-05-20

## (undated) RX ORDER — LIDOCAINE HYDROCHLORIDE 10 MG/ML
INJECTION, SOLUTION INFILTRATION; PERINEURAL
Status: DISPENSED
Start: 2017-11-18

## (undated) RX ORDER — LIDOCAINE HYDROCHLORIDE 10 MG/ML
INJECTION, SOLUTION EPIDURAL; INFILTRATION; INTRACAUDAL; PERINEURAL
Status: DISPENSED
Start: 2018-05-24

## (undated) RX ORDER — CLINDAMYCIN PHOSPHATE 900 MG/50ML
INJECTION, SOLUTION INTRAVENOUS
Status: DISPENSED
Start: 2018-05-24

## (undated) RX ORDER — TRIAMCINOLONE ACETONIDE 40 MG/ML
INJECTION, SUSPENSION INTRA-ARTICULAR; INTRAMUSCULAR
Status: DISPENSED
Start: 2018-12-29

## (undated) RX ORDER — TRIAMCINOLONE ACETONIDE 40 MG/ML
INJECTION, SUSPENSION INTRA-ARTICULAR; INTRAMUSCULAR
Status: DISPENSED
Start: 2020-06-25

## (undated) RX ORDER — LIDOCAINE HYDROCHLORIDE 20 MG/ML
INJECTION, SOLUTION EPIDURAL; INFILTRATION; INTRACAUDAL; PERINEURAL
Status: DISPENSED
Start: 2018-05-24

## (undated) RX ORDER — FENTANYL CITRATE 50 UG/ML
INJECTION, SOLUTION INTRAMUSCULAR; INTRAVENOUS
Status: DISPENSED
Start: 2018-05-24

## (undated) RX ORDER — BUPIVACAINE HYDROCHLORIDE 2.5 MG/ML
INJECTION, SOLUTION EPIDURAL; INFILTRATION; INTRACAUDAL
Status: DISPENSED
Start: 2024-05-20

## (undated) RX ORDER — BUPIVACAINE HYDROCHLORIDE 5 MG/ML
INJECTION, SOLUTION EPIDURAL; INTRACAUDAL
Status: DISPENSED
Start: 2018-05-24

## (undated) RX ORDER — DEXAMETHASONE SODIUM PHOSPHATE 10 MG/ML
INJECTION, SOLUTION INTRAMUSCULAR; INTRAVENOUS
Status: DISPENSED
Start: 2024-05-20

## (undated) RX ORDER — LIDOCAINE HYDROCHLORIDE 10 MG/ML
INJECTION, SOLUTION INFILTRATION; PERINEURAL
Status: DISPENSED
Start: 2018-12-29

## (undated) RX ORDER — TRIAMCINOLONE ACETONIDE 40 MG/ML
INJECTION, SUSPENSION INTRA-ARTICULAR; INTRAMUSCULAR
Status: DISPENSED
Start: 2018-05-27